# Patient Record
Sex: FEMALE | Race: WHITE | Employment: FULL TIME | URBAN - METROPOLITAN AREA
[De-identification: names, ages, dates, MRNs, and addresses within clinical notes are randomized per-mention and may not be internally consistent; named-entity substitution may affect disease eponyms.]

---

## 2017-02-27 ENCOUNTER — OFFICE VISIT - HEALTHEAST (OUTPATIENT)
Dept: FAMILY MEDICINE | Facility: CLINIC | Age: 32
End: 2017-02-27

## 2017-02-27 DIAGNOSIS — B37.9 CANDIDIASIS: ICD-10-CM

## 2017-06-21 ENCOUNTER — OFFICE VISIT - HEALTHEAST (OUTPATIENT)
Dept: FAMILY MEDICINE | Facility: CLINIC | Age: 32
End: 2017-06-21

## 2017-06-21 DIAGNOSIS — F41.9 ANXIETY: ICD-10-CM

## 2017-06-21 DIAGNOSIS — M25.50 ARTHRALGIA: ICD-10-CM

## 2017-06-21 DIAGNOSIS — M79.10 MYALGIA: ICD-10-CM

## 2017-06-21 ASSESSMENT — MIFFLIN-ST. JEOR: SCORE: 1403.49

## 2017-08-21 ENCOUNTER — OFFICE VISIT (OUTPATIENT)
Dept: OPTOMETRY | Facility: CLINIC | Age: 32
End: 2017-08-21
Payer: COMMERCIAL

## 2017-08-21 DIAGNOSIS — H52.13 MYOPIA OF BOTH EYES: ICD-10-CM

## 2017-08-21 DIAGNOSIS — H52.222 REGULAR ASTIGMATISM OF LEFT EYE: ICD-10-CM

## 2017-08-21 PROCEDURE — 92015 DETERMINE REFRACTIVE STATE: CPT | Performed by: OPTOMETRIST

## 2017-08-21 PROCEDURE — 92310 CONTACT LENS FITTING OU: CPT | Performed by: OPTOMETRIST

## 2017-08-21 PROCEDURE — 92012 INTRM OPH EXAM EST PATIENT: CPT | Mod: GA | Performed by: OPTOMETRIST

## 2017-08-21 ASSESSMENT — CONF VISUAL FIELD
OS_NORMAL: 1
OD_NORMAL: 1

## 2017-08-21 ASSESSMENT — KERATOMETRY
METHOD_AUTO_MANUAL: AUTOMATED
OD_AXISANGLE_DEGREES: 180
OD_K2POWER_DIOPTERS: 44.25
OS_K2POWER_DIOPTERS: 44.25
OS_AXISANGLE_DEGREES: 176
OS_K1POWER_DIOPTERS: 43.75
OD_K1POWER_DIOPTERS: 43.75

## 2017-08-21 ASSESSMENT — SLIT LAMP EXAM - LIDS
COMMENTS: NORMAL
COMMENTS: NORMAL

## 2017-08-21 ASSESSMENT — TONOMETRY
IOP_METHOD: APPLANATION
OS_IOP_MMHG: 13
OD_IOP_MMHG: 13

## 2017-08-21 ASSESSMENT — REFRACTION_CURRENTRX
OS_SPHERE: -6.00
OD_BASECURVE: 8.40
OD_SPHERE: -6.00
OS_DIAMETER: 14.0
OS_BASECURVE: 8.40
OD_DIAMETER: 14.0

## 2017-08-21 ASSESSMENT — VISUAL ACUITY
OD_CC: 20/20
OS_SC: CF @ 3'
METHOD: SNELLEN - LINEAR
OD_SC: CF @ 3'
OD_CC: 20/20
OS_CC: 20/20
OS_CC: 20/20
CORRECTION_TYPE: CONTACTS

## 2017-08-21 ASSESSMENT — REFRACTION_WEARINGRX
OS_CYLINDER: SPHERE
OD_SPHERE: -6.25
OS_SPHERE: -6.75
OD_CYLINDER: SPHERE
SPECS_TYPE: SVL

## 2017-08-21 ASSESSMENT — CUP TO DISC RATIO
OD_RATIO: 0.3
OS_RATIO: 0.3

## 2017-08-21 ASSESSMENT — EXTERNAL EXAM - LEFT EYE: OS_EXAM: NORMAL

## 2017-08-21 ASSESSMENT — REFRACTION_MANIFEST
OD_CYLINDER: SPHERE
OS_CYLINDER: +0.25
OS_SPHERE: -6.75
OD_SPHERE: -6.25
OS_AXIS: 180

## 2017-08-21 ASSESSMENT — EXTERNAL EXAM - RIGHT EYE: OD_EXAM: NORMAL

## 2017-08-21 NOTE — PROGRESS NOTES
Chief Complaint   Patient presents with     COMPREHENSIVE EYE EXAM     Accompanied by self  Previous contact lens wearer? Yes:   Comfort of contact lenses : yes  Satisfied with current lenses: Yes        Last Eye Exam: 2 years ago  Dilated Previously: Yes, no dilation today    What are you currently using to see?  glasses and contacts    Distance Vision Acuity: Satisfied with vision    Near Vision Acuity: Satisfied with vision while reading  with glasses    Eye Comfort: good  Do you use eye drops? : No  Occupation or Hobbies: pharm tech      Oanh Dove, Optometric Tech     Medical, surgical and family histories reviewed and updated 8/21/2017.       OBJECTIVE: See Ophthalmology exam    ASSESSMENT:    ICD-10-CM    1. Myopia of both eyes H52.13 EYE EXAM (SIMPLE-NONBILLABLE)     REFRACTION     CONTACT LENS FITTING,BILAT   2. Regular astigmatism of left eye H52.222 EYE EXAM (SIMPLE-NONBILLABLE)     REFRACTION     CONTACT LENS FITTING,BILAT      PLAN:   A final glasses prescription was given.  Allow time for adaptation.  The glasses may cause dizziness and affect depth perception for awhile.  Contact lens fitting, no change from previous prescription.  Gave Contact Lens Prescription, okay to order contact lenses  Return to clinic as needed, or 1 year for comprehensive vision exam       Ruthann Chahal O.D.  [address]  [address] [phone]    <  Optometry Providers       Clinic Locations                                 Telephone Number   Dr. Tita Chahal [facility] [phone]     [facility] Hours:                [facility] Hours:       [facility] Hours:  [address]   [address] [facility]     [address] [place] [address]   [place] [address]    [place] [address]  Phone: [phone]                    Phone  [phone]                      Phone: [phone]                          Monday 8:00-7:00                          Monday 8:00-7:00                          Monday 8:00-7:00              Tuesday 8:00-6:00                          Tuesday 8:00-7:00                          Tuesday 8:00-7:00              Wednesday 8:00-6:00                  Wednesday 8:00-7:00                   Wednesday 8:00-7:00      Thursday 8:00-6:00                        Thursday 8:00-7:00                         Thursday 8:00-7:00            Friday 8:00-5:00                              Friday 8:00-5:00                              Friday 8:00-5:00    Please log on to [email or web address] to order your contact lenses.  The link is found on the [facility] Care [facility] Vision Services page.  As always, Thank you for trusting us with your health care needs!

## 2017-08-21 NOTE — MR AVS SNAPSHOT
After Visit Summary   8/21/2017    Alexi Montemayor    MRN: [id number]           Patient Information     Date Of Birth          1985        Visit Information        Provider Department      8/21/2017 3:30 PM Ruthann Chahal OD [facility]        Today's Diagnoses     Myopia of both eyes        Regular astigmatism of left eye          Care Instructions        A final glasses prescription was given.  Allow time for adaptation.  The glasses may cause dizziness and affect depth perception for awhile.  Contact lens fitting, no change from previous prescription.  Gave Contact Lens Prescription, okay to order contact lenses  Return to clinic as needed, or 1 year for comprehensive vision exam       Ruthann Chahal O.D.  [facility]  [address] [place] [address]  [address] [phone]    <  Optometry Providers       Clinic Locations                                 Telephone Number   Dr. Tita Chahal [facility] [phone]     [facility] Hours:                [facility] Hours:       [facility] Hours:  [address]   [address] [facility]     [address] [place] [address]   [place] [address]    [place] [address]  Phone: [phone]                    Phone [phone]                      Phone: [phone]                          Monday 8:00-7:00                          Monday 8:00-7:00                          Monday 8:00-7:00              Tuesday 8:00-6:00                          Tuesday 8:00-7:00                          Tuesday 8:00-7:00              Wednesday 8:00-6:00                  Wednesday 8:00-7:00                   Wednesday 8:00-7:00      Thursday 8:00-6:00                        Thursday 8:00-7:00                         Thursday 8:00-7:00            Friday 8:00-5:00                              Friday  "8:00-5:00                              Friday 8:00-5:00    Please log on to [email or web address] to order your contact lenses.  The link is found on the Eye Care and Vision Services page.  As always, Thank you for trusting us with your health care needs!                    Follow-ups after your visit        Follow-up notes from your care team     Return in about 1 year (around 2018) for Eye Exam.      Who to contact     If you have questions or need follow up information about today's clinic visit or your schedule please contact [facility]UAN CARLOS directly at [phone].  Normal or non-critical lab and imaging results will be communicated to you by [employer], letter or phone within 4 business days after the clinic has received the results. If you do not hear from us within 7 days, please contact the clinic through [employer] or phone. If you have a critical or abnormal lab result, we will notify you by phone as soon as possible.  Submit refill requests through [employer] or call your pharmacy and they will forward the refill request to us. Please allow 3 business days for your refill to be completed.          Additional Information About Your Visit        MyChart Information     [employer] lets you send messages to your doctor, view your test results, renew your prescriptions, schedule appointments and more. To sign up, go to [email or web address] . Click on \"Log in\" on the left side of the screen, which will take you to the Welcome page. Then click on \"Sign up Now\" on the right side of the page.     You will be asked to enter the access code listed below, as well as some personal information. Please follow the directions to create your username and password.     Your access code is: [email or web address]  Expires: 2017  4:34 PM     Your access code will  in 90 days. If you need help or a new code, please call your [facility] or [phone].        Care EveryWhere ID     This is your Care EveryWhere ID. This " could be used by other organizations to access your [facility] medical records  [id number]         Blood Pressure from Last 3 Encounters:   12/05/14 108/78    Weight from Last 3 Encounters:   12/05/14 69.6 kg (153 lb 8 oz)              We Performed the Following     CONTACT LENS FITTING,BILAT     EYE EXAM (SIMPLE-NONBILLABLE)     REFRACTION        Primary Care Provider Office Phone # Fax #    Judie Marcia Stern [phone]       [facility] [address] [place] [address]        Equal Access to Services     [facility]: Hadii aad ku hadasho Soomaali, waaxda luqadaha, qaybta kaalmada adeegyada, waxay idiin hayaan adeeg kharash laDirkaan . So [facility] [phone].    ATENCIÓN: Si habla español, tiene a cormier disposición servicios gratuitos de asistencia lingüística. [facility] [phone].    We comply with applicable federal civil rights laws and [place] laws. We do not discriminate on the basis of race, color, national origin, age, disability sex, sexual orientation or gender identity.            Thank you!     Thank you for choosing [facility]  for your care. Our goal is always to provide you with excellent care. Hearing back from our patients is one way we can continue to improve our services. Please take a few minutes to complete the written survey that you may receive in the mail after your visit with us. Thank you!             Your Updated Medication List - Protect others around you: Learn how to safely use, store and throw away your medicines at www.disposemymeds.org.          This list is accurate as of: 8/21/17  4:35 PM.  Always use your most recent med list.                   Brand Name Dispense Instructions for use Diagnosis    OMEPRAZOLE PO

## 2017-08-21 NOTE — PATIENT INSTRUCTIONS
A final glasses prescription was given.  Allow time for adaptation.  The glasses may cause dizziness and affect depth perception for awhile.  Contact lens fitting, no change from previous prescription.  Gave Contact Lens Prescription, okay to order contact lenses  Return to clinic as needed, or 1 year for comprehensive vision exam       Ruthann Chahal O.D.  [facility]  [address] [place]  [address] [phone]    <  Optometry Providers       Clinic Locations                                 Telephone Number   Dr. Tita Chahal [facility] [phone]     [facility] Hours:                [facility] Hours:       [facility] Hours:  [address]   [address] [facility]     [address] [place] [address]   [place] [address]    [place] [address]  Phone: [phone]                    Phone [phone]                      Phone: [phone]                          Monday 8:00-7:00                          Monday 8:00-7:00                          Monday 8:00-7:00              Tuesday 8:00-6:00                          Tuesday 8:00-7:00                          Tuesday 8:00-7:00              Wednesday 8:00-6:00                  Wednesday 8:00-7:00                   Wednesday 8:00-7:00      Thursday 8:00-6:00                        Thursday 8:00-7:00                         Thursday 8:00-7:00            Friday 8:00-5:00                              Friday 8:00-5:00                              Friday 8:00-5:00    Please log on to [email or web address] to order your contact lenses.  The link is found on the Eye Care and Vision Services page.  As always, Thank you for trusting us with your health care needs!

## 2017-09-28 ENCOUNTER — COMMUNICATION - HEALTHEAST (OUTPATIENT)
Dept: FAMILY MEDICINE | Facility: CLINIC | Age: 32
End: 2017-09-28

## 2017-09-28 ENCOUNTER — OFFICE VISIT - HEALTHEAST (OUTPATIENT)
Dept: FAMILY MEDICINE | Facility: CLINIC | Age: 32
End: 2017-09-28

## 2017-09-28 DIAGNOSIS — Z00.00 ROUTINE GENERAL MEDICAL EXAMINATION AT A HEALTH CARE FACILITY: ICD-10-CM

## 2017-09-28 LAB
CHOLEST SERPL-MCNC: 153 MG/DL
FASTING STATUS PATIENT QL REPORTED: YES
HDLC SERPL-MCNC: 44 MG/DL
LDLC SERPL CALC-MCNC: 100 MG/DL
TRIGL SERPL-MCNC: 46 MG/DL

## 2017-09-28 ASSESSMENT — MIFFLIN-ST. JEOR: SCORE: 1417.38

## 2017-12-24 ENCOUNTER — HEALTH MAINTENANCE LETTER (OUTPATIENT)
Age: 32
End: 2017-12-24

## 2018-03-06 ENCOUNTER — OFFICE VISIT - HEALTHEAST (OUTPATIENT)
Dept: FAMILY MEDICINE | Facility: CLINIC | Age: 33
End: 2018-03-06

## 2018-03-06 DIAGNOSIS — J20.9 ACUTE BRONCHITIS: ICD-10-CM

## 2018-04-02 ENCOUNTER — OFFICE VISIT (OUTPATIENT)
Dept: ORTHOPEDICS | Facility: CLINIC | Age: 33
End: 2018-04-02
Payer: COMMERCIAL

## 2018-04-02 VITALS
HEIGHT: 64 IN | SYSTOLIC BLOOD PRESSURE: 145 MMHG | HEART RATE: 130 BPM | WEIGHT: 167 LBS | BODY MASS INDEX: 28.51 KG/M2 | DIASTOLIC BLOOD PRESSURE: 84 MMHG

## 2018-04-02 DIAGNOSIS — M54.50 ACUTE MIDLINE LOW BACK PAIN WITHOUT SCIATICA: Primary | ICD-10-CM

## 2018-04-02 NOTE — PROGRESS NOTES
SPORTS & ORTHOPEDIC WALK-IN VISIT 4/2/2018    Primary Care Physician: Dr. Stern    Reason for visit:     What part of your body is injured / painful? Left  low back    What caused the injury /pain? No inciting event     How long ago did your injury occur or pain begin? today    What are your most bothersome symptoms? Pain    How would you characterize your symptom?  aching and dull    What makes your symptoms better? Nothing    What makes your symptoms worse? Movement    Have you been previously seen for this problem? No    Medical History:    Any recent changes to your medical history? No    Any new medication prescribed since last visit? No    Have you had surgery on this body part before? 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 No    Do you have any vision problems? No    Do you have any chest pain or edema? No    Do you have any shortness of breath or wheezing?  No    Do you have stomach problems? No    Do you have any numbness or focal weakness? No    Do you have diabetes? No    Do you have problems with bleeding or clotting? No    Do you have an rashes or other skin lesions? No           PMH:  Past Medical History:   Diagnosis Date     Menarche 13 years old       Active problem list:  There is no problem list on file for this patient.      FH:  Family History   Problem Relation Age of Onset     Glaucoma No family hx of      Macular Degeneration No family hx of    mother MS    SH:  Social History     Social History     Marital status:      Spouse name: N/A     Number of children: N/A     Years of education: N/A     Occupational History     Not on file.     Social History Main Topics     Smoking status: Never Smoker     Smokeless tobacco: Never Used     Alcohol use Yes     Drug use: No     Sexual activity: Yes     Partners: Male     Birth control/ protection: Condom     Other Topics  Concern     Not on file     Social History Narrative     No narrative on file       MEDS:  See EMR, reviewed  ALL:  See EMR, reviewed    REVIEW OF SYSTEMS:  CONSTITUTIONAL:NEGATIVE for fever, chills, change in weight  INTEGUMENTARY/SKIN: NEGATIVE for worrisome rashes, moles or lesions  EYES: NEGATIVE for vision changes or irritation  ENT/MOUTH: NEGATIVE for ear, mouth and throat problems  RESP:NEGATIVE for significant cough or SOB  BREAST: NEGATIVE for masses, tenderness or discharge  CV: NEGATIVE for chest pain, palpitations or peripheral edema  GI: NEGATIVE for nausea, abdominal pain, heartburn, or change in bowel habits  :NEGATIVE for frequency, dysuria, or hematuria  :NEGATIVE for frequency, dysuria, or hematuria  NEURO: NEGATIVE for weakness, dizziness or paresthesias  ENDOCRINE: NEGATIVE for temperature intolerance, skin/hair changes  HEME/ALLERGY/IMMUNE: NEGATIVE for bleeding problems  PSYCHIATRIC: NEGATIVE for changes in mood or affect      SUBJECTIVE:  This 32-year-old female who works in this building in the last 2 hours has noted some discomfort in the centralized part of her lumbar spine.  She notes that in the upper part of the lumbar spine and slightly towards the right of the lumbar spine it seems to bother her more when she twists to this side.  She can think of no particular injury that caused it.  She believes it may have been bothered from twisting and lifting, but she is not sure.  She denies a past history of low back discomfort recurrently.  She currently denies abdominal pain.  She denies discomfort radiating into the lower extremities, numbness or tingling into the lower extremity.  She is in the midst of a menstrual period currently.  She has normal monthly menstrual periods.  She has had no issues with recurrent bladder infections and she has had no recent dysuria or urinary frequency or fever.  She had a slightly loose stool today, but no blood in the stools and she denies history of  recurrent diarrhea.  She is wondering whether the problem is related to anxiety.  She says she gets frequently anxious.      OBJECTIVE:  This is a comfortable appearing female who is speaking in full sentences and moves easily about the room.  She points to her centralized upper part of her low back as the area that can be uncomfortable.  She is able to forward flex easily to touch the ground.  She can extend her spine fully without discomfort.  Straight leg is negative bilaterally.  Lower extremity strength to flexion/extension at hip, knee, ankle including toe strength and foot evertor strength is 5/5 symmetrically bilaterally.  She is nontender over the sacroiliac joints and nontender directly over the lumbar spine.  The overlying skin is normal.  Abdomen is soft with no hepatosplenomegaly.  There is no guarding or rigidity.  There is no discomfort in the right or left lower quadrants and no rebound tenderness.      ASSESSMENT:  Centralized low back discomfort for the past 2 hours.      PLAN:  I told her I did not find a worrisome etiology for this at this time.  We talked about the option of doing a urinalysis to look for signs of a possible kidney stone, but her symptoms are minimal and she is currently in the midst of her menstrual period and we are unlikely to get useful information out of a urinalysis for microscopic blood at this time.  I discussed reasons to return for evaluation such as onset of abdominal discomfort, onset of persistent worsening flank pain, onset of symptoms of bladder infection.  She understands this is an orthopedic urgent care.  She currently does not have symptoms to warrant imaging study of the lumbar spine, but she knows this is an option if the problem is recurrent.  She seemed reassured by these results.  She agrees to follow up with her problems are persistent.

## 2018-04-02 NOTE — LETTER
4/2/2018       RE: Alexi Montemayor  [address] [place] [address]     Dear Colleague,    Thank you for referring your patient, Alexi Montemayor, to the [facility] AND ORTHOPAEDIC WALK IN CLINIC at [facility]. Please see a copy of my visit note below.          SPORTS & ORTHOPEDIC WALK-IN VISIT 4/2/2018    Primary Care Physician: Dr. Stern    Reason for visit:     What part of your body is injured / painful? Left  low back    What caused the injury /pain? No inciting event     How long ago did your injury occur or pain begin? today    What are your most bothersome symptoms? Pain    How would you characterize your symptom?  aching and dull    What makes your symptoms better? Nothing    What makes your symptoms worse? Movement    Have you been previously seen for this problem? No    Medical History:    Any recent changes to your medical history? No    Any new medication prescribed since last visit? No    Have you had surgery on this body part before? 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 No    Do you have any vision problems? No    Do you have any chest pain or edema? No    Do you have any shortness of breath or wheezing?  No    Do you have stomach problems? No    Do you have any numbness or focal weakness? No    Do you have diabetes? No    Do you have problems with bleeding or clotting? No    Do you have an rashes or other skin lesions? No           PMH:  Past Medical History:   Diagnosis Date     Menarche 13 years old       Active problem list:  There is no problem list on file for this patient.      FH:  Family History   Problem Relation Age of Onset     Glaucoma No family hx of      Macular Degeneration No family hx of    mother MS    SH:  Social History     Social History     Marital status:      Spouse name: N/A     Number of children: N/A      Years of education: N/A     Occupational History     Not on file.     Social History Main Topics     Smoking status: Never Smoker     Smokeless tobacco: Never Used     Alcohol use Yes     Drug use: No     Sexual activity: Yes     Partners: Male     Birth control/ protection: Condom     Other Topics Concern     Not on file     Social History Narrative     No narrative on file       MEDS:  See EMR, reviewed  ALL:  See EMR, reviewed    REVIEW OF SYSTEMS:  CONSTITUTIONAL:NEGATIVE for fever, chills, change in weight  INTEGUMENTARY/SKIN: NEGATIVE for worrisome rashes, moles or lesions  EYES: NEGATIVE for vision changes or irritation  ENT/MOUTH: NEGATIVE for ear, mouth and throat problems  RESP:NEGATIVE for significant cough or SOB  BREAST: NEGATIVE for masses, tenderness or discharge  CV: NEGATIVE for chest pain, palpitations or peripheral edema  GI: NEGATIVE for nausea, abdominal pain, heartburn, or change in bowel habits  :NEGATIVE for frequency, dysuria, or hematuria  :NEGATIVE for frequency, dysuria, or hematuria  NEURO: NEGATIVE for weakness, dizziness or paresthesias  ENDOCRINE: NEGATIVE for temperature intolerance, skin/hair changes  HEME/ALLERGY/IMMUNE: NEGATIVE for bleeding problems  PSYCHIATRIC: NEGATIVE for changes in mood or affect      SUBJECTIVE:  This 32-year-old female who works in this building in the last 2 hours has noted some discomfort in the centralized part of her lumbar spine.  She notes that in the upper part of the lumbar spine and slightly towards the right of the lumbar spine it seems to bother her more when she twists to this side.  She can think of no particular injury that caused it.  She believes it may have been bothered from twisting and lifting, but she is not sure.  She denies a past history of low back discomfort recurrently.  She currently denies abdominal pain.  She denies discomfort radiating into the lower extremities, numbness or tingling into the lower extremity.  She is in  the midst of a menstrual period currently.  She has normal monthly menstrual periods.  She has had no issues with recurrent bladder infections and she has had no recent dysuria or urinary frequency or fever.  She had a slightly loose stool today, but no blood in the stools and she denies history of recurrent diarrhea.  She is wondering whether the problem is related to anxiety.  She says she gets frequently anxious.      OBJECTIVE:  This is a comfortable appearing female who is speaking in full sentences and moves easily about the room.  She points to her centralized upper part of her low back as the area that can be uncomfortable.  She is able to forward flex easily to touch the ground.  She can extend her spine fully without discomfort.  Straight leg is negative bilaterally.  Lower extremity strength to flexion/extension at hip, knee, ankle including toe strength and foot evertor strength is 5/5 symmetrically bilaterally.  She is nontender over the sacroiliac joints and nontender directly over the lumbar spine.  The overlying skin is normal.  Abdomen is soft with no hepatosplenomegaly.  There is no guarding or rigidity.  There is no discomfort in the right or left lower quadrants and no rebound tenderness.      ASSESSMENT:  Centralized low back discomfort for the past 2 hours.      PLAN:  I told her I did not find a worrisome etiology for this at this time.  We talked about the option of doing a urinalysis to look for signs of a possible kidney stone, but her symptoms are minimal and she is currently in the midst of her menstrual period and we are unlikely to get useful information out of a urinalysis for microscopic blood at this time.  I discussed reasons to return for evaluation such as onset of abdominal discomfort, onset of persistent worsening flank pain, onset of symptoms of bladder infection.  She understands this is an orthopedic urgent care.  She currently does not have symptoms to warrant imaging study  of the lumbar spine, but she knows this is an option if the problem is recurrent.  She seemed reassured by these results.  She agrees to follow up with her problems are persistent.       Sincerely,    Radames Sherwood MD

## 2018-04-02 NOTE — MR AVS SNAPSHOT
After Visit Summary   2018    Alexi Montemayor    MRN: [id number]           Patient Information     Date Of Birth          1985        Visit Information        Provider Department      2018 9:30 AM Radames Sherwood MD [facility]         Follow-ups after your visit        Who to contact     Please call your clinic at [phone] to:    Ask questions about your health    Make or cancel appointments    Discuss your medicines    Learn about your test results    Speak to your doctor            Additional Information About Your Visit        MyChart Information     TripIt is an electronic gateway that provides easy, online access to your medical records. With TripIt, you can request a clinic appointment, read your test results, renew a prescription or communicate with your care team.     To sign up for TripIt visit the website at [email or web address]   You will be asked to enter the access code listed below, as well as some personal information. Please follow the directions to create your username and password.     Your access code is: [email or web address]  Expires: 2018  9:34 AM     Your access code will  in 90 days. If you need help or a new code, please contact your [facility] or call [phone] for assistance.        Care EveryWhere ID     This is your Care EveryWhere ID. This could be used by other organizations to access your [place] medical records  [id number]         Blood Pressure from Last 3 Encounters:   14 108/78    Weight from Last 3 Encounters:   14 69.6 kg (153 lb 8 oz)              Today, you had the following     No orders found for display       Primary Care Provider Office Phone # Fax #    Judie Stern [phone]       [facility] [address], [place] [address]        Equal Access to Services     ALYCE ORTIZ AH: Chilo carney  Benjamin, mustapha bhardwajalfonzoha, annmarie silverman, avelina yonyin hayaan maycolmadhu dafneale laDirkyasmin joleen. So [facility] [phone].    ATENCIÓN: Si habla español, tiene a cormier disposición servicios gratuitos de asistencia lingüística. Arnie al [phone].    We comply with applicable federal civil rights laws and [place] laws. We do not discriminate on the basis of race, color, national origin, age, disability, sex, sexual orientation, or gender identity.            Thank you!     Thank you for choosing [facility]  for your care. Our goal is always to provide you with excellent care. Hearing back from our patients is one way we can continue to improve our services. Please take a few minutes to complete the written survey that you may receive in the mail after your visit with us. Thank you!             Your Updated Medication List - Protect others around you: Learn how to safely use, store and throw away your medicines at www.disposemymeds.org.          This list is accurate as of 4/2/18  9:34 AM.  Always use your most recent med list.                   Brand Name Dispense Instructions for use Diagnosis    OMEPRAZOLE PO

## 2018-05-14 ENCOUNTER — OFFICE VISIT - HEALTHEAST (OUTPATIENT)
Dept: FAMILY MEDICINE | Facility: CLINIC | Age: 33
End: 2018-05-14

## 2018-05-14 ENCOUNTER — COMMUNICATION - HEALTHEAST (OUTPATIENT)
Dept: SCHEDULING | Facility: CLINIC | Age: 33
End: 2018-05-14

## 2018-05-14 DIAGNOSIS — F43.21 FEELING GRIEF: ICD-10-CM

## 2018-05-14 DIAGNOSIS — F41.9 ANXIETY: ICD-10-CM

## 2018-05-26 ENCOUNTER — COMMUNICATION - HEALTHEAST (OUTPATIENT)
Dept: FAMILY MEDICINE | Facility: CLINIC | Age: 33
End: 2018-05-26

## 2018-05-26 DIAGNOSIS — F41.9 ANXIETY: ICD-10-CM

## 2018-06-13 ENCOUNTER — AMBULATORY - HEALTHEAST (OUTPATIENT)
Dept: LAB | Facility: CLINIC | Age: 33
End: 2018-06-13

## 2018-06-13 DIAGNOSIS — F41.9 ANXIETY: ICD-10-CM

## 2018-06-13 LAB — TSH SERPL DL<=0.005 MIU/L-ACNC: 0.88 UIU/ML (ref 0.3–5)

## 2018-06-15 ENCOUNTER — COMMUNICATION - HEALTHEAST (OUTPATIENT)
Dept: FAMILY MEDICINE | Facility: CLINIC | Age: 33
End: 2018-06-15

## 2018-06-19 ENCOUNTER — OFFICE VISIT - HEALTHEAST (OUTPATIENT)
Dept: BEHAVIORAL HEALTH | Facility: HOSPITAL | Age: 33
End: 2018-06-19

## 2018-06-19 DIAGNOSIS — F41.0 PANIC DISORDER WITHOUT AGORAPHOBIA: ICD-10-CM

## 2018-06-26 ENCOUNTER — OFFICE VISIT - HEALTHEAST (OUTPATIENT)
Dept: BEHAVIORAL HEALTH | Facility: HOSPITAL | Age: 33
End: 2018-06-26

## 2018-06-26 DIAGNOSIS — F41.0 PANIC DISORDER WITHOUT AGORAPHOBIA: ICD-10-CM

## 2018-07-05 ENCOUNTER — OFFICE VISIT - HEALTHEAST (OUTPATIENT)
Dept: BEHAVIORAL HEALTH | Facility: CLINIC | Age: 33
End: 2018-07-05

## 2018-07-05 DIAGNOSIS — F41.0 PANIC DISORDER WITHOUT AGORAPHOBIA: ICD-10-CM

## 2018-07-10 ENCOUNTER — OFFICE VISIT - HEALTHEAST (OUTPATIENT)
Dept: FAMILY MEDICINE | Facility: CLINIC | Age: 33
End: 2018-07-10

## 2018-07-10 DIAGNOSIS — F41.9 ANXIETY: ICD-10-CM

## 2018-07-10 DIAGNOSIS — M25.561 RIGHT KNEE PAIN: ICD-10-CM

## 2018-07-10 DIAGNOSIS — M25.50 MULTIPLE JOINT PAIN: ICD-10-CM

## 2018-07-10 DIAGNOSIS — M22.2X9: ICD-10-CM

## 2018-07-10 LAB — RHEUMATOID FACT SERPL-ACNC: <15 IU/ML (ref 0–30)

## 2018-07-11 ENCOUNTER — COMMUNICATION - HEALTHEAST (OUTPATIENT)
Dept: SCHEDULING | Facility: CLINIC | Age: 33
End: 2018-07-11

## 2018-07-16 LAB — ANA SER QL: 0.4 U

## 2018-07-17 ENCOUNTER — AMBULATORY - HEALTHEAST (OUTPATIENT)
Dept: BEHAVIORAL HEALTH | Facility: CLINIC | Age: 33
End: 2018-07-17

## 2018-07-20 ENCOUNTER — AMBULATORY - HEALTHEAST (OUTPATIENT)
Dept: BEHAVIORAL HEALTH | Facility: CLINIC | Age: 33
End: 2018-07-20

## 2018-08-02 ENCOUNTER — COMMUNICATION - HEALTHEAST (OUTPATIENT)
Dept: FAMILY MEDICINE | Facility: CLINIC | Age: 33
End: 2018-08-02

## 2018-08-12 ENCOUNTER — OFFICE VISIT (OUTPATIENT)
Dept: URGENT CARE | Facility: URGENT CARE | Age: 33
End: 2018-08-12
Payer: COMMERCIAL

## 2018-08-12 VITALS
TEMPERATURE: 98.3 F | WEIGHT: 161.5 LBS | HEART RATE: 101 BPM | BODY MASS INDEX: 27.72 KG/M2 | SYSTOLIC BLOOD PRESSURE: 123 MMHG | OXYGEN SATURATION: 99 % | DIASTOLIC BLOOD PRESSURE: 78 MMHG

## 2018-08-12 DIAGNOSIS — R10.2 PELVIC PAIN IN FEMALE: Primary | ICD-10-CM

## 2018-08-12 LAB
SPECIMEN SOURCE: NORMAL
WET PREP SPEC: NORMAL

## 2018-08-12 PROCEDURE — 87252 VIRUS INOCULATION TISSUE: CPT | Mod: 90 | Performed by: FAMILY MEDICINE

## 2018-08-12 PROCEDURE — 87077 CULTURE AEROBIC IDENTIFY: CPT | Performed by: FAMILY MEDICINE

## 2018-08-12 PROCEDURE — 87210 SMEAR WET MOUNT SALINE/INK: CPT | Performed by: FAMILY MEDICINE

## 2018-08-12 PROCEDURE — 87070 CULTURE OTHR SPECIMN AEROBIC: CPT | Performed by: FAMILY MEDICINE

## 2018-08-12 PROCEDURE — 99214 OFFICE O/P EST MOD 30 MIN: CPT | Performed by: FAMILY MEDICINE

## 2018-08-12 PROCEDURE — 87186 SC STD MICRODIL/AGAR DIL: CPT | Performed by: FAMILY MEDICINE

## 2018-08-12 PROCEDURE — 99000 SPECIMEN HANDLING OFFICE-LAB: CPT | Performed by: FAMILY MEDICINE

## 2018-08-12 RX ORDER — MUPIROCIN 20 MG/G
OINTMENT TOPICAL 3 TIMES DAILY
Qty: 30 G | Refills: 1 | Status: SHIPPED | OUTPATIENT
Start: 2018-08-12 | End: 2019-02-21

## 2018-08-12 RX ORDER — CLINDAMYCIN HCL 300 MG
300 CAPSULE ORAL 3 TIMES DAILY
Qty: 21 CAPSULE | Refills: 0 | Status: SHIPPED | OUTPATIENT
Start: 2018-08-12 | End: 2018-11-12

## 2018-08-12 RX ORDER — METRONIDAZOLE 500 MG/1
500 TABLET ORAL 2 TIMES DAILY
Qty: 14 TABLET | Refills: 0 | Status: SHIPPED | OUTPATIENT
Start: 2018-08-12 | End: 2018-11-12

## 2018-08-12 NOTE — PATIENT INSTRUCTIONS
1. Warm sitz bath nightly x 7 days  2. Medication  3. Await test results  4. bactroban to area   5. Follow PCP in the next week.

## 2018-08-12 NOTE — PROGRESS NOTES
SUBJECTIVE:   Alexi Montemayor is a 33 year old female presenting with a chief complaint of   Chief Complaint   Patient presents with     Vaginal Problem     x 4-5 days. Painful blister and yellowish discharge from vaginal area.     She did not have clean underwear to go to work and so did not wear underwear.  By the end of the day it appears that she has had some macerated skin on her right labia.  It began to swell yesterday and she has had a little bit of drainage from the area.    She is an established patient of [facility].        Review of Systems   Skin:        Rt labia is swollen and painful, open lesion. .   All other systems reviewed and are negative.      Past Medical History:   Diagnosis Date     Menarche 13 years old     Family History   Problem Relation Age of Onset     Glaucoma No family hx of      Macular Degeneration No family hx of      No current outpatient prescriptions on file.     Social History   Substance Use Topics     Smoking status: Never Smoker     Smokeless tobacco: Never Used     Alcohol use Yes      Comment: Rarely       OBJECTIVE  /78 (BP Location: Right arm, Patient Position: Chair, Cuff Size: Adult Regular)  Pulse 101  Temp 98.3  F (36.8  C) (Tympanic)  Wt 161 lb 8 oz (73.3 kg)  LMP 07/28/2018 (Approximate)  SpO2 99%  Breastfeeding? No  BMI 27.72 kg/m2    Physical Exam   Genitourinary:   Genitourinary Comments: Rt labial swelling ; drainage from inferior aspect of labia.    Musculoskeletal: Normal range of motion.   Neurological: She is alert.   Skin: Skin is warm.   Rt labial wound open and draining. Cultures were taken.   Psychiatric: She has a normal mood and affect.       Labs:  Results for orders placed or performed in visit on 08/12/18   Wet prep   Result Value Ref Range    Specimen Description Vagina     Wet Prep No Trichomonas seen     Wet Prep No clue cells seen     Wet Prep No yeast seen        X-Ray was not done.    ASSESSMENT:      ICD-10-CM    1. Pelvic pain in  female R10.2       2. Rt Labial wound; draining, cultures pending    She will hold off on the antibiotics except the bactroban until the cultures are back. She will call in 48 hours.    We obtained viral bacterial cultures and addition we did a wet prep.        Medical Decision Making:    Differential Diagnosis: glandular infection, skin infection      Serious Comorbid Conditions:      PLAN:  1. We are awaiting skin culture results.     Followup:    Follow in the next 48 hours.     There are no Patient Instructions on file for this visit.     Level 4 visit ; 25 minutes in exam and counseling. Greater than 50% of time in counseling in regards to infection and vaginitis/vulvitis.

## 2018-08-12 NOTE — MR AVS SNAPSHOT
"              After Visit Summary   2018    Alexi Montemayor    MRN: [id number]           Patient Information     Date Of Birth          1985        Visit Information        Provider Department      2018 3:55 PM Bulmaro Washington MD [facility]        Today's Diagnoses     Pelvic pain in female    -  1      Care Instructions    1. Warm sitz bath nightly x 7 days  2. Medication  3. Await test results  4. bactroban to area   5. Follow PCP in the next week.          Follow-ups after your visit        Who to contact     If you have questions or need follow up information about today's clinic visit or your schedule please contact [facility] directly at [phone].  Normal or non-critical lab and imaging results will be communicated to you by infibondhart, letter or phone within 4 business days after the clinic has received the results. If you do not hear from us within 7 days, please contact the clinic through infibondhart or phone. If you have a critical or abnormal lab result, we will notify you by phone as soon as possible.  Submit refill requests through GlossyBox or call your pharmacy and they will forward the refill request to us. Please allow 3 business days for your refill to be completed.          Additional Information About Your Visit        MyChart Information     GlossyBox lets you send messages to your doctor, view your test results, renew your prescriptions, schedule appointments and more. To sign up, go to [email or web address] . Click on \"Log in\" on the left side of the screen, which will take you to the Welcome page. Then click on \"Sign up Now\" on the right side of the page.     You will be asked to enter the access code listed below, as well as some personal information. Please follow the directions to create your username and password.     Your access code is: [email or web address]  Expires: 11/10/2018  4:37 PM     Your access code will  in 90 days. If you need help " or a new code, please call your [facility] or [phone].        Care EveryWhere ID     This is your Care EveryWhere ID. This could be used by other organizations to access your [place] medical records  [id number]        Your Vitals Were     Pulse Temperature Last Period Pulse Oximetry Breastfeeding? BMI (Body Mass Index)    101 98.3  F (36.8  C) (Tympanic) 07/28/2018 (Approximate) 99% No 27.72 kg/m2       Blood Pressure from Last 3 Encounters:   08/12/18 123/78   04/02/18 145/84   12/05/14 108/78    Weight from Last 3 Encounters:   08/12/18 161 lb 8 oz (73.3 kg)   04/02/18 167 lb (75.8 kg)   12/05/14 153 lb 8 oz (69.6 kg)              Today, you had the following     No orders found for display         Today's Medication Changes          These changes are accurate as of 8/12/18  4:37 PM.  If you have any questions, ask your nurse or doctor.               Start taking these medicines.        Dose/Directions    clindamycin 300 MG capsule   Commonly known as:  CLEOCIN   Used for:  Pelvic pain in female   Started by:  Bulmaro Washington MD        Dose:  300 mg   Take 1 capsule (300 mg) by mouth 3 times daily   Quantity:  21 capsule   Refills:  0       metroNIDAZOLE 500 MG tablet   Commonly known as:  FLAGYL   Used for:  Pelvic pain in female   Started by:  Bulmaro Washington MD        Dose:  500 mg   Take 1 tablet (500 mg) by mouth 2 times daily   Quantity:  14 tablet   Refills:  0            Where to get your medicines      These medications were sent to [facility] [address] [place] [address] AT [place] [address]  [address] [place] [id number]     Phone:  [phone]     clindamycin 300 MG capsule    metroNIDAZOLE 500 MG tablet                Primary Care Provider Office Phone # Fax #    Judie Stern [phone]       [facility] [address] [place] [id number]        Equal Access to Services     ALYCE ORTIZ AH: Chilo lacy  zainab Alexander, wajanelda lukellieadaha, qaybta karaza silverman, avelina yonyin hayaakieran severinomadhu dafneale lagretchenkieran joleen. So [facility] [phone].    ATENCIÓN: Si habla español, tiene a cormier disposición servicios gratuitos de asistencia lingüística. Arnie al [phone].    We comply with applicable federal civil rights laws and [place] laws. We do not discriminate on the basis of race, color, national origin, age, disability, sex, sexual orientation, or gender identity.            Thank you!     Thank you for choosing [facility]  for your care. Our goal is always to provide you with excellent care. Hearing back from our patients is one way we can continue to improve our services. Please take a few minutes to complete the written survey that you may receive in the mail after your visit with us. Thank you!             Your Updated Medication List - Protect others around you: Learn how to safely use, store and throw away your medicines at www.disposemymeds.org.          This list is accurate as of 8/12/18  4:37 PM.  Always use your most recent med list.                   Brand Name Dispense Instructions for use Diagnosis    clindamycin 300 MG capsule    CLEOCIN    21 capsule    Take 1 capsule (300 mg) by mouth 3 times daily    Pelvic pain in female       metroNIDAZOLE 500 MG tablet    FLAGYL    14 tablet    Take 1 tablet (500 mg) by mouth 2 times daily    Pelvic pain in female

## 2018-08-15 LAB
BACTERIA SPEC CULT: ABNORMAL
Lab: ABNORMAL
SPECIMEN SOURCE: ABNORMAL

## 2018-08-16 ENCOUNTER — TELEPHONE (OUTPATIENT)
Dept: URGENT CARE | Facility: URGENT CARE | Age: 33
End: 2018-08-16

## 2018-08-16 NOTE — TELEPHONE ENCOUNTER
Per Ami, Culture results are back. She is to take the Clindamycin and no need for other medications.  I tried to call Pt. But no answer left a message.   Yuliet Bui  CMA

## 2018-08-25 LAB
SPECIMEN SOURCE: NORMAL
VIRUS SPEC CULT: NORMAL
VIRUS SPEC CULT: NORMAL

## 2018-09-27 ENCOUNTER — COMMUNICATION - HEALTHEAST (OUTPATIENT)
Dept: SCHEDULING | Facility: CLINIC | Age: 33
End: 2018-09-27

## 2018-10-12 ENCOUNTER — APPOINTMENT (OUTPATIENT)
Dept: GENERAL RADIOLOGY | Facility: CLINIC | Age: 33
End: 2018-10-12
Attending: INTERNAL MEDICINE
Payer: COMMERCIAL

## 2018-10-12 ENCOUNTER — HOSPITAL ENCOUNTER (EMERGENCY)
Facility: CLINIC | Age: 33
Discharge: HOME | End: 2018-10-12
Attending: INTERNAL MEDICINE | Admitting: INTERNAL MEDICINE
Payer: COMMERCIAL

## 2018-10-12 VITALS
DIASTOLIC BLOOD PRESSURE: 65 MMHG | OXYGEN SATURATION: 98 % | SYSTOLIC BLOOD PRESSURE: 118 MMHG | RESPIRATION RATE: 12 BRPM | BODY MASS INDEX: 28.35 KG/M2 | HEIGHT: 63 IN | WEIGHT: 160 LBS | TEMPERATURE: 98.4 F

## 2018-10-12 DIAGNOSIS — R07.9 ACUTE CHEST PAIN: ICD-10-CM

## 2018-10-12 DIAGNOSIS — F41.9 ANXIETY: ICD-10-CM

## 2018-10-12 LAB
ANION GAP SERPL CALCULATED.3IONS-SCNC: 10 MMOL/L (ref 3–14)
BASOPHILS # BLD AUTO: 0 10E9/L (ref 0–0.2)
BASOPHILS NFR BLD AUTO: 0.4 %
BUN SERPL-MCNC: 12 MG/DL (ref 7–30)
CALCIUM SERPL-MCNC: 8.6 MG/DL (ref 8.5–10.1)
CHLORIDE SERPL-SCNC: 109 MMOL/L (ref 94–109)
CO2 SERPL-SCNC: 22 MMOL/L (ref 20–32)
CREAT SERPL-MCNC: 0.69 MG/DL (ref 0.52–1.04)
D DIMER PPP FEU-MCNC: 0.4 UG/ML FEU (ref 0–0.5)
DIFFERENTIAL METHOD BLD: NORMAL
EOSINOPHIL # BLD AUTO: 0.1 10E9/L (ref 0–0.7)
EOSINOPHIL NFR BLD AUTO: 1.7 %
ERYTHROCYTE [DISTWIDTH] IN BLOOD BY AUTOMATED COUNT: 12.6 % (ref 10–15)
GFR SERPL CREATININE-BSD FRML MDRD: >90 ML/MIN/1.7M2
GLUCOSE SERPL-MCNC: 103 MG/DL (ref 70–99)
HCT VFR BLD AUTO: 39.1 % (ref 35–47)
HGB BLD-MCNC: 12.8 G/DL (ref 11.7–15.7)
IMM GRANULOCYTES # BLD: 0 10E9/L (ref 0–0.4)
IMM GRANULOCYTES NFR BLD: 0.4 %
INTERPRETATION ECG - MUSE: NORMAL
LYMPHOCYTES # BLD AUTO: 3 10E9/L (ref 0.8–5.3)
LYMPHOCYTES NFR BLD AUTO: 39.6 %
MCH RBC QN AUTO: 28.6 PG (ref 26.5–33)
MCHC RBC AUTO-ENTMCNC: 32.7 G/DL (ref 31.5–36.5)
MCV RBC AUTO: 88 FL (ref 78–100)
MONOCYTES # BLD AUTO: 0.7 10E9/L (ref 0–1.3)
MONOCYTES NFR BLD AUTO: 9 %
NEUTROPHILS # BLD AUTO: 3.7 10E9/L (ref 1.6–8.3)
NEUTROPHILS NFR BLD AUTO: 48.9 %
NRBC # BLD AUTO: 0 10*3/UL
NRBC BLD AUTO-RTO: 0 /100
PLATELET # BLD AUTO: 276 10E9/L (ref 150–450)
POTASSIUM SERPL-SCNC: 3.5 MMOL/L (ref 3.4–5.3)
RBC # BLD AUTO: 4.47 10E12/L (ref 3.8–5.2)
SODIUM SERPL-SCNC: 141 MMOL/L (ref 133–144)
TROPONIN I SERPL-MCNC: <0.015 UG/L (ref 0–0.04)
WBC # BLD AUTO: 7.6 10E9/L (ref 4–11)

## 2018-10-12 PROCEDURE — 85379 FIBRIN DEGRADATION QUANT: CPT | Performed by: PHYSICIAN ASSISTANT

## 2018-10-12 PROCEDURE — 99285 EMERGENCY DEPT VISIT HI MDM: CPT | Mod: 25

## 2018-10-12 PROCEDURE — 85025 COMPLETE CBC W/AUTO DIFF WBC: CPT | Performed by: INTERNAL MEDICINE

## 2018-10-12 PROCEDURE — 84484 ASSAY OF TROPONIN QUANT: CPT | Performed by: INTERNAL MEDICINE

## 2018-10-12 PROCEDURE — 85379 FIBRIN DEGRADATION QUANT: CPT | Performed by: INTERNAL MEDICINE

## 2018-10-12 PROCEDURE — 71046 X-RAY EXAM CHEST 2 VIEWS: CPT

## 2018-10-12 PROCEDURE — 80048 BASIC METABOLIC PNL TOTAL CA: CPT | Performed by: INTERNAL MEDICINE

## 2018-10-12 PROCEDURE — 93005 ELECTROCARDIOGRAM TRACING: CPT

## 2018-10-12 RX ORDER — KETOROLAC TROMETHAMINE 15 MG/ML
15 INJECTION, SOLUTION INTRAMUSCULAR; INTRAVENOUS ONCE
Status: DISCONTINUED | OUTPATIENT
Start: 2018-10-12 | End: 2018-10-12

## 2018-10-12 ASSESSMENT — ENCOUNTER SYMPTOMS
DIAPHORESIS: 0
NERVOUS/ANXIOUS: 1
SHORTNESS OF BREATH: 1
CHILLS: 0
ABDOMINAL PAIN: 0
NAUSEA: 0
COUGH: 1
CHEST TIGHTNESS: 1
FEVER: 0

## 2018-10-12 NOTE — LETTER
October 12, 2018      To Whom It May Concern:      Alexi Montemayor was seen in our Emergency Department today, 10/12/18.  I expect her condition to improve over the next 1-2 days.  She may return to work/school when improved.    Sincerely,        Shana Schmidt RN

## 2018-10-12 NOTE — DISCHARGE INSTRUCTIONS
Treating Anxiety Disorders with Therapy    If you have an anxiety disorder, you don t have to suffer anymore. Treatment is available. Therapy (also called counseling) is often a helpful treatment for anxiety disorders. With therapy, a specially trained professional (therapist) helps you face and learn to manage your anxiety. Therapy can be short-term or long-term depending on your needs. In some cases, medicine may also be prescribed with therapy. It may take time before you notice how much therapy is helping, but stick with it. With therapy, you can feel better.  Cognitive behavioral therapy (CBT)  Cognitive behavioral therapy (CBT) teaches you to manage anxiety. It does this by helping you understand how you think and act when you re anxious. Research has shown CBT to be a very effective treatment for anxiety disorders. How CBT is run is almost like a class. It involves homework and activities to build skills that teach you to cope with anxiety step by step. It can be done in a group or one-on-one, and often takes place for a set number of sessions. CBT has two main parts:    Cognitive therapy helps you identify the negative, irrational thoughts that occur with your anxiety. You ll learn to replace these with more positive, realistic thoughts.    Behavioral therapy helps you change how you react to anxiety. You ll learn coping skills and methods for relaxing to help you better deal with anxiety.  Other forms of therapy  Other therapy methods may work better for you than CBT. Or, you may move from CBT to another form of therapy as your treatment needs change. This may mean meeting with a therapist by yourself or in a group. Therapy can also help you work through problems in your life, such as drug or alcohol dependence, that may be making your anxiety worse.  Getting better takes time  Therapy will help you feel better and teach you skills to help manage anxiety long term. But change doesn t happen right away. It  takes a commitment from you. And treatment only works if you learn to face the causes of your anxiety. So, you might feel worse before you feel better. This can sometimes make it hard to stick with it. But remember: Therapy is a very effective treatment. The results will be well worth it.  Helping yourself  If anxiety is wearing you down, here are some things you can do to cope:    Check with your doctor and rule out any physical problems that may be causing the anxiety symptoms.    If an anxiety disorder is diagnosed, seek mental healthcare. This is an illness and it can respond to treatment. Most types of anxiety disorders will respond to talk therapy and medicine.    Educate yourself about anxiety disorders. Keep track of helpful online resources and books you can use during stressful periods.    Try stress management techniques such as meditation.    Consider online or in-person support groups.    Don t fight your feelings. Anxiety feeds itself. The more you worry about it, the worse it gets. Instead, try to identify what might have triggered your anxiety. Then try to put this threat in perspective.    Keep in mind that you can t control everything about a situation. Change what you can and let the rest take its course.    Exercise -- it s a great way to relieve tension and help your body feel relaxed.    Examine your life for stress, and try to find ways to reduce it.    Avoid caffeine and nicotine, which can make anxiety symptoms worse.    Fight the temptation to turn to alcohol or unprescribed drugs for relief. They only make things worse in the long run.   Date Last Reviewed: 1/1/2017 2000-2017 [employer]. [address] [facility], [place] [address]. All rights reserved. This information is not intended as a substitute for professional medical care. Always follow your healthcare professional's instructions.

## 2018-10-12 NOTE — ED TRIAGE NOTES
Pt states she was moving last week (sunday) felt right shoulder/chest discomfort later that day. That pain resolved within a day.   Wednesday was at [employer], bending over and noticed pain with bending in center of chest/right side. Noticed more with deep breathing. Lasted approximately 40 minutes and then resolved. No pain yesterday.  This morning was lying in bed flat on back and noticed pain again with cough. Denies pain currently.

## 2018-10-12 NOTE — ED PROVIDER NOTES
History     Chief Complaint:  Chest pain     HPI   Alexi Montemayor is a 33 year old female with a history of anxiety and gastritis, who presents to the ED this morning for evaluation of sudden onset chest pain. The patient denies a previous history of blood clots or heart disease, but states that 2 days ago she was bending over at target when she felt a sudden sharp pain at the center of her chest. This pain lasted about 40 minutes, before subsiding, although she has since had returning pain with bending, deep inspiration or cough. She notes that prior to this she did have some left shoulder pain after a day of heavy lifting, but states that this pain is new in onset and not comparable to previous cases of gastritis. She has had no symptoms with exertion.  Then, this morning about 1 hour prior to arrival, the patient was laying in bed when she began to cough and felt this centered chest pain return, without radiation, prompting her to the ED. There is no blood productive with this cough. She is anxious and tachycardic on arrival, and endorses shortness of breath of breath due to this. The patient otherwise denies associated leg swelling, diaphoresis, or nausea. The patient has been eating and drinking fine, and does not endorse any further acute symptoms at this time such as chills or fever.     CARDIAC RISK FACTORS:  Sex:    Female  Tobacco:   Negative  Hypertension:   Negative  Hyperlipidemia:  Negative  Diabetes:   Negative  Family History:  Hyperlipidemia    PE/DVT RISK FACTORS:  Sex:    Female  Hormones:   Negative  Tobacco:   Negative  Cancer:   Negative  Travel:   Negative  Surgery:   Negative  Other immobilization: Negative  Personal history:  Negative  Family history:  Negative    Allergies:  Penicillins    Medications:    The patient is not currently taking any prescribed medications.    Past Medical History:    Gastritis  Menarche   Anxiety    Past Surgical History:    History reviewed. No pertinent  "surgical history.    Family History:    Hypertension    Social History:  The patient was accompanied to the ED by her son.  Smoking Status: Never  Smokeless Tobacco: Never  Alcohol Use: Yes  Marital Status:        Review of Systems   Constitutional: Negative for chills, diaphoresis and fever.   Respiratory: Positive for cough, chest tightness and shortness of breath.    Cardiovascular: Positive for chest pain. Negative for leg swelling.   Gastrointestinal: Negative for abdominal pain and nausea.   Psychiatric/Behavioral: The patient is nervous/anxious.    All other systems reviewed and are negative.    Physical Exam     Patient Vitals for the past 24 hrs:   BP Temp Temp src Heart Rate Resp SpO2 Height Weight   10/12/18 0904 - - - - 12 - - -   10/12/18 0903 - - - - - 98 % - -   10/12/18 0901 118/65 - - 107 - - - -   10/12/18 0812 128/76 98.4  F (36.9  C) Oral 96 20 100 % 1.6 m (5' 3\") 72.6 kg (160 lb)   10/12/18 0803 - - - - - 98 % - -   10/12/18 0759 128/76 - - - - - - -       Physical Exam  General: Anxious appearing female.  Awake and alert.  Head:  Scalp is NC/AT  Eyes:  No scleral icterus, PERRL  ENT:  The external nose and ears are normal.   CV:  Tachycardic but regular.    No pathologic murmur, rubs, or gallops.  Resp:  Breath sounds are clear bilaterally.  No crackles, wheezes, rhonchi.    Non-labored, no retractions or accessory muscle use  GI:  Abdomen is soft, no distension, no tenderness. No peritoneal signs.  Bowel sounds present in all quadrants.  :  No suprapubic or flank tenderness  MS:  No lower extremity edema     No tenderness to palpation over anterior chest wall.  Skin:  Warm and dry, No rash or lesions noted.  Neuro: Oriented x 3. No gross motor deficits.  Psych: Awake and alert.  Very anxious appearing and speaking rapidly.    Emergency Department Course     ECG (08:03:10):  Rate 146 bpm. NE interval 114. QRS duration 84. QT/QTc 352/548. P-R-T axes 64 80 50. Sinus tachycardia. " Nonspecific ST abnormality. Abnormal ECG. Interpreted at 0812 by Ambar Shelton MD.    Imaging:  Radiology findings were communicated with the patient who voiced understanding of the findings.    Chest XR 2 Views:  No acute pulmonary disease.    DEREK JACKSON MD    Laboratory:  Laboratory findings were communicated with the patient who voiced understanding of the findings.    CBC: WNL (WBC 7.6, HGB 12.8, )  BMP: Glucose 103 (H), (WNL (Creatinine 0.69)    Troponin (Collected 0819): <0.015  D-dimer: 0.4    Emergency Department Course:  Nursing notes and vitals reviewed.    The patient was sent for a chest XR while in the emergency department, results above.     IV was inserted and blood was drawn for laboratory testing, results above.    0803: I performed an exam of the patient as documented above.   0933: Dr. Shelton spoke with Lillian from [facility] regarding patient presentation.     Findings and plan explained to the Patient. Patient discharged home with instructions regarding supportive care, medications, and reasons to return. The importance of close follow-up was reviewed.     Impression & Plan      Medical Decision Making:  Alexi Montemayor is a 33-year-old female who presents with chest pain.  Patient history and records reviewed.  Broad differential was considered including: Acute coronary syndrome, pulmonary embolism, pneumothorax, pneumonia, dissection, Boerhaave syndrome, referred GI pain, musculoskeletal chest wall pain etc.  On examination, the patient is tachycardic but with otherwise normal vitals.  She is significantly anxious appearing but has an otherwise normal exam.  EKG was obtained which demonstrates sinus tachycardia.  Chest x-ray and lab work including troponin and d-dimer were normal.    I suspect the patient's pain is most likely musculoskeletal chest wall pain, made worse by the patient's significant anxiety.  Exceedingly low risk for acute coronary syndrome given pleuritic and sharp  nature of chest pain, patient age and absence of risk factors, absence of exertional symptoms.  As noted above no evidence of ischemia on EKG and negative initial troponin do not feel that delta troponin is necessary at this time.  No risk factors for PE other than sinus tachycardia and d-dimer negative.  She has no abdominal tenderness or GI symptoms to suggest referred pain.  Recommended ibuprofen as needed for pain, and follow-up with primary care provider if symptoms not improving.  Discussed strategies to reduce anxiety, including exercise, meditation, and follow-up with primary for consideration of medication or therapy if not improving.  The patient has Discussed indications to return to the ED if new or worsening symptoms.    Diagnosis:    ICD-10-CM    1. Acute chest pain R07.9    2. Anxiety F41.9        Disposition:  discharged to home    Pina Charles  10/12/2018   [facility] EMERGENCY DEPARTMENT  Pina STERLING am serving as a scribe at 8:03 AM on 10/12/2018 to document services personally performed by Romie Mcadams PA-C based on my observations and the provider's statements to me.       Romie Mcadams PA-C  10/12/18 0974

## 2018-10-12 NOTE — ED AVS SNAPSHOT
[facility] Emergency Department    [address]    [facility] [address]    Phone:  [phone]    Fax:  [phone]                                       Alexi Montemayor   MRN: [id number]    Department:  [facility] Emergency Department   Date of Visit:  10/12/2018           After Visit Summary Signature Page     I have received my discharge instructions, and my questions have been answered. I have discussed any challenges I see with this plan with the nurse or doctor.    ..........................................................................................................................................  Patient/Patient Representative Signature      ..........................................................................................................................................  Patient Representative Print Name and Relationship to Patient    ..................................................               ................................................  Date                                   Time    ..........................................................................................................................................  Reviewed by Signature/Title    ...................................................              ..............................................  Date                                               Time          22EPIC Rev 08/18

## 2018-10-12 NOTE — ED NOTES
Emergency Department Attending Supervision Note  10/12/2018  8:30 AM      I evaluated this patient in conjunction with Romie Mcadams PA-C      Briefly, the patient presented with  ***      On my exam, ***    Results:    ED course:    My impression is ***        Diagnosis  No diagnosis found.      Ambar Shelton MD

## 2018-10-12 NOTE — ED AVS SNAPSHOT
[facility] Emergency Department    [address] [place] [address]    Phone:  [phone]    Fax:  [phone]                                       Alexi Montemayor   MRN: [id number]    Department:  [facility] Emergency Department   Date of Visit:  10/12/2018           Patient Information     Date Of Birth          1985        Your diagnoses for this visit were:     Acute chest pain     Anxiety        You were seen by Ambar Shelton MD.        Discharge Instructions         Treating Anxiety Disorders with Therapy    If you have an anxiety disorder, you don t have to suffer anymore. Treatment is available. Therapy (also called counseling) is often a helpful treatment for anxiety disorders. With therapy, a specially trained professional (therapist) helps you face and learn to manage your anxiety. Therapy can be short-term or long-term depending on your needs. In some cases, medicine may also be prescribed with therapy. It may take time before you notice how much therapy is helping, but stick with it. With therapy, you can feel better.  Cognitive behavioral therapy (CBT)  Cognitive behavioral therapy (CBT) teaches you to manage anxiety. It does this by helping you understand how you think and act when you re anxious. Research has shown CBT to be a very effective treatment for anxiety disorders. How CBT is run is almost like a class. It involves homework and activities to build skills that teach you to cope with anxiety step by step. It can be done in a group or one-on-one, and often takes place for a set number of sessions. CBT has two main parts:    Cognitive therapy helps you identify the negative, irrational thoughts that occur with your anxiety. You ll learn to replace these with more positive, realistic thoughts.    Behavioral therapy helps you change how you react to anxiety. You ll learn coping skills and methods for relaxing to help you better deal with  anxiety.  Other forms of therapy  Other therapy methods may work better for you than CBT. Or, you may move from CBT to another form of therapy as your treatment needs change. This may mean meeting with a therapist by yourself or in a group. Therapy can also help you work through problems in your life, such as drug or alcohol dependence, that may be making your anxiety worse.  Getting better takes time  Therapy will help you feel better and teach you skills to help manage anxiety long term. But change doesn t happen right away. It takes a commitment from you. And treatment only works if you learn to face the causes of your anxiety. So, you might feel worse before you feel better. This can sometimes make it hard to stick with it. But remember: Therapy is a very effective treatment. The results will be well worth it.  Helping yourself  If anxiety is wearing you down, here are some things you can do to cope:    Check with your doctor and rule out any physical problems that may be causing the anxiety symptoms.    If an anxiety disorder is diagnosed, seek mental healthcare. This is an illness and it can respond to treatment. Most types of anxiety disorders will respond to talk therapy and medicine.    Educate yourself about anxiety disorders. Keep track of helpful online resources and books you can use during stressful periods.    Try stress management techniques such as meditation.    Consider online or in-person support groups.    Don t fight your feelings. Anxiety feeds itself. The more you worry about it, the worse it gets. Instead, try to identify what might have triggered your anxiety. Then try to put this threat in perspective.    Keep in mind that you can t control everything about a situation. Change what you can and let the rest take its course.    Exercise -- it s a great way to relieve tension and help your body feel relaxed.    Examine your life for stress, and try to find ways to reduce it.    Avoid caffeine  and nicotine, which can make anxiety symptoms worse.    Fight the temptation to turn to alcohol or unprescribed drugs for relief. They only make things worse in the long run.   Date Last Reviewed: 1/1/2017 2000-2017 [employer]. [address] [place] [address], [place] [address]. All rights reserved. This information is not intended as a substitute for professional medical care. Always follow your healthcare professional's instructions.          24 Hour Appointment Hotline       To make an appointment at any [facility], call [phone] ([phone]). If you don't have a family doctor or clinic, we will help you find one. [facility] are conveniently located to serve the needs of you and your family.             Review of your medicines      Our records show that you are taking the medicines listed below. If these are incorrect, please call your family doctor or clinic.        Dose / Directions Last dose taken    clindamycin 300 MG capsule   Commonly known as:  CLEOCIN   Dose:  300 mg   Quantity:  21 capsule        Take 1 capsule (300 mg) by mouth 3 times daily   Refills:  0        metroNIDAZOLE 500 MG tablet   Commonly known as:  FLAGYL   Dose:  500 mg   Quantity:  14 tablet        Take 1 tablet (500 mg) by mouth 2 times daily   Refills:  0                Procedures and tests performed during your visit     Basic metabolic panel    CBC with platelets differential    D dimer quantitative    EKG 12 lead    Troponin I    XR Chest 2 Views      Orders Needing Specimen Collection     None      Pending Results     Date and Time Order Name Status Description    10/12/2018 0920 EKG 12 lead Preliminary             Pending Culture Results     No orders found from 10/10/2018 to 10/13/2018.            Pending Results Instructions     If you had any lab results that were not finalized at the time of your Discharge, you can call the ED Lab Result RN at [phone]. You will be contacted by this team for  any positive Lab results or changes in treatment. The nurses are available 7 days a week from 10A to 6:30P.  You can leave a message 24 hours per day and they will return your call.        Test Results From Your Hospital Stay        10/12/2018  8:28 AM      Narrative     XR CHEST 2 VW 10/12/2018 8:25 AM     HISTORY: Chest Pain;      COMPARISON: None available        Impression     IMPRESSION:  No acute pulmonary disease.    DEREK JACKSON MD         10/12/2018  8:30 AM      Component Results     Component Value Ref Range & Units Status    WBC 7.6 4.0 - 11.0 10e9/L Final    RBC Count 4.47 3.8 - 5.2 10e12/L Final    Hemoglobin 12.8 11.7 - 15.7 g/dL Final    Hematocrit 39.1 35.0 - 47.0 % Final    MCV 88 78 - 100 fl Final    MCH 28.6 26.5 - 33.0 pg Final    MCHC 32.7 31.5 - 36.5 g/dL Final    RDW 12.6 10.0 - 15.0 % Final    Platelet Count 276 150 - 450 10e9/L Final    Diff Method Automated Method  Final    % Neutrophils 48.9 % Final    % Lymphocytes 39.6 % Final    % Monocytes 9.0 % Final    % Eosinophils 1.7 % Final    % Basophils 0.4 % Final    % Immature Granulocytes 0.4 % Final    Nucleated RBCs 0 0 /100 Final    Absolute Neutrophil 3.7 1.6 - 8.3 10e9/L Final    Absolute Lymphocytes 3.0 0.8 - 5.3 10e9/L Final    Absolute Monocytes 0.7 0.0 - 1.3 10e9/L Final    Absolute Eosinophils 0.1 0.0 - 0.7 10e9/L Final    Absolute Basophils 0.0 0.0 - 0.2 10e9/L Final    Abs Immature Granulocytes 0.0 0 - 0.4 10e9/L Final    Absolute Nucleated RBC 0.0  Final         10/12/2018  8:51 AM      Component Results     Component Value Ref Range & Units Status    Sodium 141 133 - 144 mmol/L Final    Potassium 3.5 3.4 - 5.3 mmol/L Final    Chloride 109 94 - 109 mmol/L Final    Carbon Dioxide 22 20 - 32 mmol/L Final    Anion Gap 10 3 - 14 mmol/L Final    Glucose 103 (H) 70 - 99 mg/dL Final    Urea Nitrogen 12 7 - 30 mg/dL Final    Creatinine 0.69 0.52 - 1.04 mg/dL Final    GFR Estimate >90 >60 mL/min/1.7m2 Final    Non African American GFR  Calc    GFR Estimate If Black >90 >60 mL/min/1.7m2 Final    African American GFR Calc    Calcium 8.6 8.5 - 10.1 mg/dL Final         10/12/2018  8:51 AM      Component Results     Component Value Ref Range & Units Status    Troponin I ES <0.015 0.000 - 0.045 ug/L Final    The 99th percentile for upper reference range is 0.045 ug/L.  Troponin values   in the range of 0.045 - 0.120 ug/L may be associated with risks of adverse   clinical events.           10/12/2018  9:21 AM      Component Results     Component Value Ref Range & Units Status    D Dimer 0.4 0.0 - 0.50 ug/ml FEU Final    This D-dimer assay is intended for use in conjunction with a clinical pretest   probability assessment model to exclude pulmonary embolism (PE) and deep   venous thrombosis (DVT) in outpatients suspected of PE or DVT. The cut-off   value is 0.5 ug/mL FEU.                  Clinical Quality Measure: Blood Pressure Screening     Your blood pressure was checked while you were in the emergency department today. The last reading we obtained was  BP: 118/65 . Please read the guidelines below about what these numbers mean and what you should do about them.  If your systolic blood pressure (the top number) is less than 120 and your diastolic blood pressure (the bottom number) is less than 80, then your blood pressure is normal. There is nothing more that you need to do about it.  If your systolic blood pressure (the top number) is 120-139 or your diastolic blood pressure (the bottom number) is 80-89, your blood pressure may be higher than it should be. You should have your blood pressure rechecked within a year by a primary care provider.  If your systolic blood pressure (the top number) is 140 or greater or your diastolic blood pressure (the bottom number) is 90 or greater, you may have high blood pressure. High blood pressure is treatable, but if left untreated over time it can put you at risk for heart attack, stroke, or kidney failure. You  "should have your blood pressure rechecked by a primary care provider within the next 4 weeks.  If your provider in the emergency department today gave you specific instructions to follow-up with your doctor or provider even sooner than that, you should follow that instruction and not wait for up to 4 weeks for your follow-up visit.        Thank you for choosing [facility]       Thank you for choosing [facility] for your care. Our goal is always to provide you with excellent care. Hearing back from our patients is one way we can continue to improve our services. Please take a few minutes to complete the written survey that you may receive in the mail after you visit with us. Thank you!        [employer] Information     [employer] lets you send messages to your doctor, view your test results, renew your prescriptions, schedule appointments and more. To sign up, go to [email or web address] . Click on \"Log in\" on the left side of the screen, which will take you to the Welcome page. Then click on \"Sign up Now\" on the right side of the page.     You will be asked to enter the access code listed below, as well as some personal information. Please follow the directions to create your username and password.     Your access code is: [email or web address]  Expires: 11/10/2018  4:37 PM     Your access code will  in 90 days. If you need help or a new code, please call your [facility] clinic or [phone].        Care EveryWhere ID     This is your Care EveryWhere ID. This could be used by other organizations to access your [facility] medical records  [id number]        Equal Access to Services     ALYCE ORTIZ : Hadii bambi lamb hadasho Sopauloali, waaxda luqadaha, qaybta kaalmada myrnayada, avelina goodrich. So [facility] [phone].    ATENCIÓN: Si habla español, tiene a cormier disposición servicios gratuitos de asistencia lingüística. Llame al [phone].    We comply with applicable federal civil rights laws and [place] " laws. We do not discriminate on the basis of race, color, national origin, age, disability, sex, sexual orientation, or gender identity.            After Visit Summary       This is your record. Keep this with you and show to your community pharmacist(s) and doctor(s) at your next visit.

## 2018-11-12 ENCOUNTER — OFFICE VISIT (OUTPATIENT)
Dept: FAMILY MEDICINE | Facility: CLINIC | Age: 33
End: 2018-11-12
Payer: COMMERCIAL

## 2018-11-12 VITALS
BODY MASS INDEX: 29.41 KG/M2 | RESPIRATION RATE: 16 BRPM | WEIGHT: 166 LBS | SYSTOLIC BLOOD PRESSURE: 122 MMHG | HEART RATE: 110 BPM | TEMPERATURE: 98 F | HEIGHT: 63 IN | DIASTOLIC BLOOD PRESSURE: 70 MMHG | OXYGEN SATURATION: 100 %

## 2018-11-12 DIAGNOSIS — M79.18 MYOFASCIAL MUSCLE PAIN: Primary | ICD-10-CM

## 2018-11-12 PROCEDURE — 99213 OFFICE O/P EST LOW 20 MIN: CPT | Performed by: FAMILY MEDICINE

## 2018-11-12 NOTE — MR AVS SNAPSHOT
After Visit Summary   11/12/2018    Alexi Montemayor    MRN: [id number]           Patient Information     Date Of Birth          1985        Visit Information        Provider Department      11/12/2018 4:00 PM Stna Garcia MD [facility]        Today's Diagnoses     Myofascial muscle pain    -  1      Care Instructions                  Neck Spasm Rehabilitation Exercises   You may do all of these exercises right away but avoid any movements that increase your pain.     Neck rotation with flexion:   Right: Turn your head to the right and clasp your hands behind your head. Let the weight of your arms pull your chin to the right side of your chest. Relax. Hold for a count of 15. Do this 3 times.   Left: Turn your head to the left and clasp your hands behind your head. Let the weight of your arms pull your chin to the left side of your chest. Relax. Hold for a count of 15. Do this 3 times.     Chin tuck: Place your fingertips on your chin and gently push your head straight back as if you are trying to make a double chin. Keep looking forward as your head moves back. Hold 5 seconds and repeat 5 times.     Scalene stretch: This stretches the neck muscles that attach to your ribs. Sitting in an upright position, clasp both hands behind your back, lower your left shoulder, and tilt your head toward the right. Hold this position for 15 to 30 seconds and then come back to the starting position. Lower your right shoulder and tilt your head toward the left until you feel a stretch. Hold for 15 to 30 seconds. Repeat 3 times on each side.     Neck rotation stretch   Right side: Rotate your neck by looking over your right shoulder. Lift your right hand and place your palm on the left side of your chin. Push your chin with your palm toward your right shoulder. Hold for a count of 10. Do this 3 times.   Left side: Rotate your neck by looking over your left shoulder. Lift your left hand and place  your palm on the right side of your chin. Push your chin with your palm toward your left shoulder. Hold for a count of 10. Do this 3 times.     Scapular squeeze: While sitting or standing with your arms by your sides, squeeze your shoulder blades together and hold for 5 seconds. Do 3 sets of 10.     Thoracic extension: While sitting in a chair, clasp both arms behind your head. Gently arch backward and look up toward the ceiling. Repeat 10 times. Do this several times per day.                      Follow-ups after your visit        Additional Services     [facility] PT, HAND, AND CHIROPRACTIC REFERRAL       Physical Therapy, Hand Therapy and Chiropractic Care are available through:  *[facility]  *Hand Therapy (Occupational Therapy or Physical Therapy)  *[facility]    Call one number to schedule at any of the above locations: [phone].    Physical therapy, Hand therapy and/or Chiropractic care has been recommended by your physician as an excellent treatment option to reduce pain and help people return to normal activities, including sports.  Therapy and/or chiropractic care services are a great complement or alternative to expensive and invasive surgery, injections, or long-term use of prescription medications. The primary goal is to identify the underlying problem and provide you the tools to manage your condition on your own.     Please be aware that coverage of these services is subject to the terms and limitations of your health insurance plan.  Call member services at your health plan with any benefit or coverage questions.      Please bring the following to your appointment:  *Your personal calendar for scheduling future appointments  *Comfortable clothing                  Future tests that were ordered for you today     Open Future Orders        Priority Expected Expires Ordered    [facility] PT, HAND, AND CHIROPRACTIC REFERRAL Routine  11/12/2019 11/12/2018            Who  "to contact     If you have questions or need follow up information about today's clinic visit or your schedule please contact [facility] directly at [phone].  Normal or non-critical lab and imaging results will be communicated to you by MyChart, letter or phone within 4 business days after the clinic has received the results. If you do not hear from us within 7 days, please contact the clinic through MyChart or phone. If you have a critical or abnormal lab result, we will notify you by phone as soon as possible.  Submit refill requests through "[employer]" or call your pharmacy and they will forward the refill request to us. Please allow 3 business days for your refill to be completed.          Additional Information About Your Visit        Care EveryWhere ID     This is your Care EveryWhere ID. This could be used by other organizations to access your [facility] medical records  [id number]        Your Vitals Were     Pulse Temperature Respirations Height Last Period Pulse Oximetry    110 98  F (36.7  C) (Oral) 16 5' 3\" (1.6 m) 10/25/2018 (Approximate) 100%    Breastfeeding? BMI (Body Mass Index)                No 29.41 kg/m2           Blood Pressure from Last 3 Encounters:   11/12/18 122/70   10/12/18 118/65   08/12/18 123/78    Weight from Last 3 Encounters:   11/12/18 166 lb (75.3 kg)   10/12/18 160 lb (72.6 kg)   08/12/18 161 lb 8 oz (73.3 kg)               Primary Care Provider Office Phone # Fax #    Judie Marcia Stern [phone]       [facility] [address] [place] [address]        Equal Access to Services     [facility] DIANA AH: Hadii aad ku hadasho Sopauloali, waaxda luqadaha, qaybta kaalmada andra, avelina goodrich. So [facility] [phone].    ATENCIÓN: Si habla español, tiene a cormier disposición servicios gratuitos de asistencia lingüística. Hanselame al [phone].    We comply with applicable federal civil rights laws and [place] " laws. We do not discriminate on the basis of race, color, national origin, age, disability, sex, sexual orientation, or gender identity.            Thank you!     Thank you for choosing [facility]  for your care. Our goal is always to provide you with excellent care. Hearing back from our patients is one way we can continue to improve our services. Please take a few minutes to complete the written survey that you may receive in the mail after your visit with us. Thank you!             Your Updated Medication List - Protect others around you: Learn how to safely use, store and throw away your medicines at www.disposemymeds.org.      Notice  As of 11/12/2018  4:21 PM    You have not been prescribed any medications.

## 2018-11-12 NOTE — PROGRESS NOTES
"  SUBJECTIVE:   Alexi Montemayor is a 33 year old female who presents to clinic today for the following health issues:    Patient reports intermittent head pain during the past week. The pain is located at the back of her head and occurs with specific head movements. The pain worsens while at work and with anxiety. She wonders if this is related to neck tension caused by working on the computer all day. Patient has noticed tinnitus in the mornings as well. She is taking ibuprofen. Denies radiating arm pain, numbness or tingling.     Problem list and histories reviewed & adjusted, as indicated.  Additional history: as documented    There is no problem list on file for this patient.    History reviewed. No pertinent surgical history.    Social History   Substance Use Topics     Smoking status: Never Smoker     Smokeless tobacco: Never Used     Alcohol use Yes      Comment: Rarely     Family History   Problem Relation Age of Onset     Glaucoma No family hx of      Macular Degeneration No family hx of            Reviewed and updated as needed this visit by clinical staff  Tobacco  Allergies  Med Hx  Surg Hx  Fam Hx  Soc Hx      Reviewed and updated as needed this visit by Provider         ROS:  ENT/MOUTH: as noted above   MUSCULOSKELETAL: as noted above   NEURO: as noted above   PSYCHIATRIC: as noted above     This document serves as a record of the services and decisions personally performed and made by Stan Garcia MD. It was created on his behalf by Laney Ruiz, a trained medical scribe. The creation of this document is based on the provider's statements to the medical scribe.  Laney Ruiz 4:06 PM November 12, 2018    OBJECTIVE:     /70 (BP Location: Right arm, Patient Position: Chair, Cuff Size: Adult Regular)  Pulse 110  Temp 98  F (36.7  C) (Oral)  Resp 16  Ht 1.6 m (5' 3\")  Wt 75.3 kg (166 lb)  LMP 10/25/2018 (Approximate)  SpO2 100%  Breastfeeding? No  BMI 29.41 kg/m2  Body mass index is " 29.41 kg/(m^2).  GENERAL: healthy, alert and no distress  HENT: ear canals and TM's normal  MS: tight upper trapezius muscles bilaterally, R>L restriction to side flexion, normal strength and sensation    ASSESSMENT/PLAN:     1. Myofascial muscle pain  Discussed conservative care for neck muscle tightness issue causing tension headache. Return if worsening symptoms or radicular symptoms. Consider massage, physical therapy if not improving. Can use ice, heat, NSAIDS, muscle relaxer as needed. Stretching exercise and stretching handout given.   - [facility] PT, HAND, AND CHIROPRACTIC REFERRAL; Future    The information in this document, created by the medical scribe for me, accurately reflects the services I personally performed and the decisions made by me. I have reviewed and approved this document for accuracy prior to leaving the patient care area.  November 12, 2018 4:22 PM    Stan Garcia MD  [facility]

## 2018-11-13 PROBLEM — F41.9 ANXIETY: Status: ACTIVE | Noted: 2017-06-21

## 2018-11-21 ENCOUNTER — THERAPY VISIT (OUTPATIENT)
Dept: PHYSICAL THERAPY | Facility: CLINIC | Age: 33
End: 2018-11-21
Payer: COMMERCIAL

## 2018-11-21 DIAGNOSIS — M54.2 NECK PAIN: Primary | ICD-10-CM

## 2018-11-21 DIAGNOSIS — M79.18 MYOFASCIAL MUSCLE PAIN: ICD-10-CM

## 2018-11-21 PROCEDURE — 97161 PT EVAL LOW COMPLEX 20 MIN: CPT | Mod: GP | Performed by: PHYSICAL THERAPIST

## 2018-11-21 PROCEDURE — 97110 THERAPEUTIC EXERCISES: CPT | Mod: GP | Performed by: PHYSICAL THERAPIST

## 2018-11-21 NOTE — PROGRESS NOTES
[facility] Initial Evaluation  Alexi Montemayor is a 33 year old  female referred to physical therapy by Dr. Garcia for treatment of neck pain with Precautions/Restrictions/MD instructions eval and treat   Physical Therapy Initial Evaluation: Subjective History      Injury/Condition Details:  Presenting Complaint Neck pain   Onset Timing/Date October 2018    Mechanism Insidious onset without acute precipitating event      Symptom Behavior Details    Primary Pain Symptoms Location: Base of skull  Quality: ache sometimes sharp   Frequency: Intermittent   Worst Pain 5/10   Best Pain 0/10   Symptom Provocators Stress at work, cervical rotation   Symptom Relievers Rest, massage   Time of day dependent? No   Recent symptom change? Small improvement since onset      Prior Testing/Intervention for current condition:  Prior Tests None   Prior Treatment Massage treatment with some benefit      Lifestyle & General Medical History:  General Health Reported by Patient good   Employment Pharmacy tech   Usual physical activities  (within past year) Swimming, walking   Orthopaedic history None   Notable medical history anxiety       HPI                    Objective:  Standing Alignment:    Cervical/Thoracic:  Forward head  Shoulder/UE:  Rounded shoulders                                  Cervical/Thoracic Evaluation    AROM:  AROM Cervical:    Flexion:            75%, mild pain  Extension:       90%, mild pain  Rotation:         Left: 90%, painfree     Right: 90%, painfree  Side Bend:      Left: 75%, mild pain     Right:  75%, mild pain      Headaches: cervical  Cervical Myotomes:  normal                        Cervical Palpation:    Tenderness present at Left:    Upper Trap; Levator and Suboccipitals  Tenderness present at Right:    Upper Trap; Levator and Suboccipitals      Spinal Segmental Conclusions:    Level:  Hypo at C7, T1, T2 and T3                                                General      ROS    Assessment/Plan:    Patient is a 33 year old female with cervical complaints.    Patient has the following significant findings with corresponding treatment plan.                Diagnosis 1:  Neck pain  Pain -  manual therapy, splint/taping/bracing/orthotics, self management, education, directional preference exercise and home program  Decreased joint mobility - manual therapy, therapeutic exercise, therapeutic activity and home program  Decreased strength - therapeutic exercise, therapeutic activities and home program  Decreased proprioception - neuro re-education, therapeutic activities and home program  Impaired muscle performance - neuro re-education and home program    Therapy Evaluation Codes:   1) History comprised of:   Personal factors that impact the plan of care:      None.    Comorbidity factors that impact the plan of care are:      Anxiety.     Medications impacting care: None.  2) Examination of Body Systems comprised of:   Body structures and functions that impact the plan of care:      Cervical spine.   Activity limitations that impact the plan of care are:      Lifting, Reading/Computer work and Working.  3) Clinical presentation characteristics are:   Stable/Uncomplicated.  4) Decision-Making    Low complexity using standardized patient assessment instrument and/or measureable assessment of functional outcome.  Cumulative Therapy Evaluation is: Low complexity.    Previous and current functional limitations:  (See Goal Flow Sheet for this information)    Short term and Long term goals: (See Goal Flow Sheet for this information)     Communication ability:  Patient appears to be able to clearly communicate and understand verbal and written communication and follow directions correctly.  Treatment Explanation - The following has been discussed with the patient:   RX ordered/plan of care  Anticipated outcomes  Possible risks and side effects  This patient would benefit from PT intervention  to resume normal activities.   Rehab potential is good.    Frequency:  1 X week, once daily  Duration:  for 6 weeks  Discharge Plan:  Achieve all LTG.  Independent in home treatment program.  Reach maximal therapeutic benefit.    Please refer to the daily flowsheet for treatment today, total treatment time and time spent performing 1:1 timed codes.

## 2018-11-21 NOTE — MR AVS SNAPSHOT
After Visit Summary   11/21/2018    Alexi Montemayor    MRN: [id number]           Patient Information     Date Of Birth          1985        Visit Information        Provider Department      11/21/2018 8:00 AM Samir Chowdary, CARL[facility] Physical Therapy        Today's Diagnoses     Neck pain    -  1    Myofascial muscle pain           Follow-ups after your visit        Your next 10 appointments already scheduled     Dec 07, 2018  4:10 PM CST   [facility] with Samir Chowdary PT   [facility] [place] ([facility] [place]  )    [address] [facility]  [address] [place] [address]   [phone]              Who to contact     If you have questions or need follow up information about today's clinic visit or your schedule please contact [facility] PHYSICAL THERAP[facility] directly at [phone].  Normal or non-critical lab and imaging results will be communicated to you by MyChart, letter or phone within 4 business days after the clinic has received the results. If you do not hear from us within 7 days, please contact the clinic through MyChart or phone. If you have a critical or abnormal lab result, we will notify you by phone as soon as possible.  Submit refill requests through [employer] or call your pharmacy and they will forward the refill request to us. Please allow 3 business days for your refill to be completed.          Additional Information About Your Visit        Care EveryWhere ID     This is your Care EveryWhere ID. This could be used by other organizations to access your [facility] medical records  [id number]        Your Vitals Were     Last Period                   10/25/2018 (Approximate)            Blood Pressure from Last 3 Encounters:   11/12/18 122/70   10/12/18 118/65   08/12/18 123/78    Weight from Last 3 Encounters:   11/12/18 75.3 kg (166 lb)   10/12/18 72.6 kg (160 lb)   08/12/18 73.3 kg (161 lb 8 oz)              We  Performed the Following     HC PT EVAL, LOW COMPLEXITY     [facility] INITIAL EVAL REPORT     [facility] PT, HAND, AND CHIROPRACTIC REFERRAL     THERAPEUTIC EXERCISES        Primary Care Provider Office Phone # Fax #    Judie Stern [phone]       [facility] [address] [place] [address]        Equal Access to Services     [facility]: Hadii aad ku hadasho Soomaali, waaxda luqadaha, qaybta kaalmada adeegyada, waxay idiin hayaan adeeg kharash la'aan . So [facility] [phone].    ATENCIÓN: Si habla español, tiene a cormier disposición servicios gratuitos de asistencia lingüística. Llame al [phone].    We comply with applicable federal civil rights laws and [place] laws. We do not discriminate on the basis of race, color, national origin, age, disability, sex, sexual orientation, or gender identity.            Thank you!     Thank you for choosing [facility] PHYSICAL THERAP[facility]  for your care. Our goal is always to provide you with excellent care. Hearing back from our patients is one way we can continue to improve our services. Please take a few minutes to complete the written survey that you may receive in the mail after your visit with us. Thank you!             Your Updated Medication List - Protect others around you: Learn how to safely use, store and throw away your medicines at www.disposemymeds.org.      Notice  As of 11/21/2018  9:15 AM    You have not been prescribed any medications.

## 2018-12-07 ENCOUNTER — THERAPY VISIT (OUTPATIENT)
Dept: PHYSICAL THERAPY | Facility: CLINIC | Age: 33
End: 2018-12-07
Payer: COMMERCIAL

## 2018-12-07 DIAGNOSIS — M79.18 MYOFASCIAL MUSCLE PAIN: ICD-10-CM

## 2018-12-07 DIAGNOSIS — M54.2 NECK PAIN: ICD-10-CM

## 2018-12-07 PROCEDURE — 97530 THERAPEUTIC ACTIVITIES: CPT | Mod: GP | Performed by: PHYSICAL THERAPIST

## 2018-12-07 PROCEDURE — 99207 C STAT DRY NEEDLING - IAM: CPT | Mod: 25 | Performed by: PHYSICAL THERAPIST

## 2019-01-09 ENCOUNTER — THERAPY VISIT (OUTPATIENT)
Dept: PHYSICAL THERAPY | Facility: CLINIC | Age: 34
End: 2019-01-09
Payer: COMMERCIAL

## 2019-01-09 DIAGNOSIS — M54.2 NECK PAIN: ICD-10-CM

## 2019-01-09 DIAGNOSIS — M79.18 MYOFASCIAL MUSCLE PAIN: ICD-10-CM

## 2019-01-09 PROCEDURE — 97110 THERAPEUTIC EXERCISES: CPT | Mod: GP | Performed by: PHYSICAL THERAPIST

## 2019-01-09 PROCEDURE — 97140 MANUAL THERAPY 1/> REGIONS: CPT | Mod: GP | Performed by: PHYSICAL THERAPIST

## 2019-01-09 PROCEDURE — 97112 NEUROMUSCULAR REEDUCATION: CPT | Mod: GP | Performed by: PHYSICAL THERAPIST

## 2019-01-11 ENCOUNTER — OFFICE VISIT - HEALTHEAST (OUTPATIENT)
Dept: AUDIOLOGY | Facility: CLINIC | Age: 34
End: 2019-01-11

## 2019-01-11 ENCOUNTER — OFFICE VISIT - HEALTHEAST (OUTPATIENT)
Dept: OTOLARYNGOLOGY | Facility: CLINIC | Age: 34
End: 2019-01-11

## 2019-01-11 DIAGNOSIS — H93.12 TINNITUS, LEFT: ICD-10-CM

## 2019-01-11 DIAGNOSIS — H93.13 TINNITUS OF BOTH EARS: ICD-10-CM

## 2019-01-21 ENCOUNTER — OFFICE VISIT (OUTPATIENT)
Dept: BEHAVIORAL HEALTH | Facility: CLINIC | Age: 34
End: 2019-01-21
Payer: COMMERCIAL

## 2019-01-21 DIAGNOSIS — F41.1 GAD (GENERALIZED ANXIETY DISORDER): Primary | ICD-10-CM

## 2019-01-21 DIAGNOSIS — F41.0 PANIC DISORDER WITHOUT AGORAPHOBIA: ICD-10-CM

## 2019-01-21 DIAGNOSIS — F32.0 CURRENT MILD EPISODE OF MAJOR DEPRESSIVE DISORDER WITHOUT PRIOR EPISODE (H): ICD-10-CM

## 2019-01-21 PROCEDURE — 90839 PSYTX CRISIS INITIAL 60 MIN: CPT | Performed by: COUNSELOR

## 2019-01-21 ASSESSMENT — ANXIETY QUESTIONNAIRES
IF YOU CHECKED OFF ANY PROBLEMS ON THIS QUESTIONNAIRE, HOW DIFFICULT HAVE THESE PROBLEMS MADE IT FOR YOU TO DO YOUR WORK, TAKE CARE OF THINGS AT HOME, OR GET ALONG WITH OTHER PEOPLE: SOMEWHAT DIFFICULT
1. FEELING NERVOUS, ANXIOUS, OR ON EDGE: MORE THAN HALF THE DAYS
2. NOT BEING ABLE TO STOP OR CONTROL WORRYING: SEVERAL DAYS
5. BEING SO RESTLESS THAT IT IS HARD TO SIT STILL: SEVERAL DAYS
6. BECOMING EASILY ANNOYED OR IRRITABLE: NOT AT ALL
3. WORRYING TOO MUCH ABOUT DIFFERENT THINGS: MORE THAN HALF THE DAYS
GAD7 TOTAL SCORE: 9
7. FEELING AFRAID AS IF SOMETHING AWFUL MIGHT HAPPEN: SEVERAL DAYS

## 2019-01-21 ASSESSMENT — PATIENT HEALTH QUESTIONNAIRE - PHQ9
5. POOR APPETITE OR OVEREATING: MORE THAN HALF THE DAYS
SUM OF ALL RESPONSES TO PHQ QUESTIONS 1-9: 7

## 2019-01-21 NOTE — PATIENT INSTRUCTIONS
"                                           Alexi Johnsonman     SAFETY PLAN:  Step 1: Warning signs / cues (Thoughts, images, mood, situation, behavior) that a crisis may be developing:    Thoughts: \"I can't do this anymore\", \"I just want this to end\" and \" I just don't want to be anxious anymore\"    Images: visions of harm: how I might harm myself    Thinking Processes: ruminations (can't stop thinking about my problems)  , intrusive thoughts (bothersome, unwanted thoughts that come out of nowhere)   and highly critical and negative thoughts    Mood: worsening depression, hopelessness and intense worry    Behaviors: not taking care of my responsibilities    Situations: loss: mom in nursing care facility , anniversary of sons accident, pain, relationship problems, trauma  and financial stress   Step 2: Coping strategies - Things I can do to take my mind off of my problems without contacting another person (relaxation technique, physical activity):    Distress Tolerance Strategies:  play with my pet , listen to positive and upbeat music: Yazdanism, watch a funny movie: Grey Anatomy, pray, paced breathing/progressive muscle relaxation and intense exercise: walking for 2-3 minutes     Physical Activities: go for a walk, exercise: yoga, yoga, gardening, deep breathing and stretching     Focus on helpful thoughts:  \"This is temporary\", \"I will get through this\", \"It always passes\", remind myself of what is important to me: son and self-compassion statements: \" You are doing fine\"  Step 3: People and social settings that provide distraction:   Name: Aileen Phone: [phone]   Name: Cierra Phone: [phone]       movie theater, gym , [employer] and walking around outside    Step 4: Remind myself of people and things that are important to me and worth living for:  Son, online classes, physical health , getting rid of anxiety, travel       Step 5: When I am in crisis, I can ask these people to help me use my safety plan:   " Name: Miguel Phone: [phone]   Step 6: Making the environment safe:     remove things I could use to hurt myself:   and be around others  Step 7: Professionals or agencies I can contact during a crisis:    [facility] Daytime and After Hours Crisis Number: [phone]    Suicide Prevention Lifeline: 2-176-390-TALK (8255)    Crisis Text Line Service (available 24 hours a day, 7 days a week): Text [place] to 727804  [facility] Crisis Services: [phone]    Call 911 or go to my nearest emergency department.   I helped develop this safety plan and agree to use it when needed.  I have been given a copy of this plan.      Client signature _________________________________________________________________  Today s date:  1/21/2019  Adapted from Safety Plan Template 2008 Munira Cage and Andrea Stern is reprinted with the express permission of the authors.  No portion of the Safety Plan Template may be reproduced without the express, written permission.  You can contact the authors at [email or web address] or [email or web address].   [employer]

## 2019-01-21 NOTE — PROGRESS NOTES
Progress Note - Initial Session    Client Name:  Alexi Montemayor Date: 1/21/2019           Service Type: Individual      Session Start Time: 3:30pm  Session End Time: 4:30pm       Session Length: 53 - 60      Session #: 1     Attendees: Client attended alone         Diagnostic Assessment in progress.  Unable to complete documentation at the conclusion of the first session due to creation of a safety plan due to  client scoring 1 on PHQ9 #9.       Mental Status Assessment:  Appearance:   Appropriate   Eye Contact:   Good   Psychomotor Behavior: Normal   Attitude:   Cooperative   Orientation:   All  Speech   Rate / Production: Normal    Volume:  Normal   Mood:    Anxious  Normal  Affect:    Appropriate   Thought Content:  Clear  Rumination   Thought Form:  Coherent   Insight:    Fair       Safety Issues and Plan for Safety and Risk Management:  Client denies current fears or concerns for personal safety.  Client reports the following current or recent suicidal ideation or behaviors: intrusive thoughts of harming self but denies plan or intent .  Client denies current or recent homicidal ideation or behaviors.  Client denies current or recent self injurious behavior or ideation.  Client denies other safety concerns.  A safety and risk management plan has been developed including: Client consented to co-developed safety plan.  [facility]'s safety and risk management plan was completed.  Client agreed to use safety plan should any safety concerns arise.  A copy was given to the patient.  Client reports there are firearms in the house. The firearms are secured in a locked space.      Diagnostic Criteria:  A. Excessive anxiety and worry about a number of events or activities (such as work or school performance).   B. The person finds it difficult to control the worry.  C. Select 3 or more symptoms (required for diagnosis). Only one item is required in children.   - Restlessness or feeling keyed up or on edge.    -  Being easily fatigued.    - Difficulty concentrating or mind going blank.    - Irritability.    - Muscle tension.    - Sleep disturbance (difficulty falling or staying asleep, or restless unsatisfying sleep).   1. Recurrent unexpected panic attacks and meets criteria 2, 3, and 4 (below)  2. At least one of the attacks has been followed by 1 month (or more) of one (or more) of the following:     (a) persistent concern about having additional attacks     (b) worry about the implications of the attack or its consequences     (c) a significant change in behavior related to the attacks  3. Absence of agoraphobia  4. The panic attacks are not to the the direct physiological effects of a substance or general medical condition  5. The panic attacks are not better accounted for by another mental disorder, such as social phobia, specific phobia, OCD, PTSD, or separation anxiety disorder  A) Single episode - symptoms have been present during the same 2-week period and represent a change from previous functioning 5 or more symptoms (required for diagnosis)   - Depressed mood. Note: In children and adolescents, can be irritable mood.     - Diminished interest or pleasure in all, or almost all, activities.    - Fatigue or loss of energy.    - Diminished ability to think or concentrate, or indecisiveness.         DSM5 Diagnoses: (Sustained by DSM5 Criteria Listed Above)  Diagnoses: 296.21 (F32.0) Major Depressive Disorder, Single Episode, Mild _  300.01 (F41.0) Panic Disorder  300.02 (F41.1) Generalized Anxiety Disorder  Psychosocial & Contextual Factors: health, work, marriage   WHODAS 2.0 (12 item)To be completed next session at completion of DA    Intervention: motivational interviewing creating the spirit of partnership to build rapport  Also crisis intervention using safety plan              Collateral Reports Completed:  Routed note to PCP after DA completion       PLAN: (Homework, other):  Client stated that she may follow  up for ongoing services with [facility].  F/u 1/30/19      Michelle Santos, [facility] 1/21/2019

## 2019-01-22 ENCOUNTER — TELEPHONE (OUTPATIENT)
Dept: FAMILY MEDICINE | Facility: CLINIC | Age: 34
End: 2019-01-22

## 2019-01-22 ASSESSMENT — ANXIETY QUESTIONNAIRES: GAD7 TOTAL SCORE: 9

## 2019-01-22 NOTE — TELEPHONE ENCOUNTER
Panel Management Review       Patient has the following on her problem list: None      Composite cancer screening  Chart review shows that this patient is due/due soon for the following Pap Smear  Summary:    Patient is due/failing the following:   PAP    Action needed:   Patient needs office visit for px- pap.    Type of outreach:    Phone, left message for patient to call back.     Questions for provider review:    None                                                                                                                                    Carol Hammer CMA       Chart routed to none .

## 2019-02-05 ENCOUNTER — TELEPHONE (OUTPATIENT)
Dept: BEHAVIORAL HEALTH | Facility: CLINIC | Age: 34
End: 2019-02-05

## 2019-02-06 ENCOUNTER — OFFICE VISIT (OUTPATIENT)
Dept: DERMATOLOGY | Facility: CLINIC | Age: 34
End: 2019-02-06
Payer: COMMERCIAL

## 2019-02-06 VITALS — HEART RATE: 118 BPM | OXYGEN SATURATION: 99 % | SYSTOLIC BLOOD PRESSURE: 139 MMHG | DIASTOLIC BLOOD PRESSURE: 87 MMHG

## 2019-02-06 DIAGNOSIS — D22.9 MULTIPLE BENIGN NEVI: ICD-10-CM

## 2019-02-06 DIAGNOSIS — D48.5 NEOPLASM OF UNCERTAIN BEHAVIOR OF SKIN: Primary | ICD-10-CM

## 2019-02-06 PROCEDURE — 88305 TISSUE EXAM BY PATHOLOGIST: CPT | Mod: TC | Performed by: PHYSICIAN ASSISTANT

## 2019-02-06 PROCEDURE — 99204 OFFICE O/P NEW MOD 45 MIN: CPT | Mod: 25 | Performed by: PHYSICIAN ASSISTANT

## 2019-02-06 PROCEDURE — 11102 TANGNTL BX SKIN SINGLE LES: CPT | Performed by: PHYSICIAN ASSISTANT

## 2019-02-06 NOTE — PATIENT INSTRUCTIONS
Wound Care Instructions     FOR SUPERFICIAL WOUNDS     [facility] [phone]    [facility] [phone]          AFTER 24 HOURS YOU SHOULD REMOVE THE BANDAGE AND BEGIN DAILY DRESSING CHANGES AS FOLLOWS:     1) Remove Dressing.     2) Clean and dry the area with tap water using a Q-tip or sterile gauze pad.     3) Apply Vaseline, Aquaphor, Polysporin ointment or Bacitracin ointment over entire wound.  Do NOT use Neosporin ointment.     4) Cover the wound with a band-aid, or a sterile non-stick gauze pad and micropore paper tape      REPEAT THESE INSTRUCTIONS AT LEAST ONCE A DAY UNTIL THE WOUND HAS COMPLETELY HEALED.    It is an old wives tale that a wound heals better when it is exposed to air and allowed to dry out. The wound will heal faster with a better cosmetic result if it is kept moist with ointment and covered with a bandage.    **Do not let the wound dry out.**      Supplies Needed:      *Cotton tipped applicators (Q-tips)    *Polysporin Ointment or Bacitracin Ointment (NOT NEOSPORIN)    *Band-aids or non-stick gauze pads and micropore paper tape.      PATIENT INFORMATION:    During the healing process you will notice a number of changes. All wounds develop a small halo of redness surrounding the wound.  This means healing is occurring. Severe itching with extensive redness usually indicates sensitivity to the ointment or bandage tape used to dress the wound.  You should call our office if this develops.      Swelling  and/or discoloration around your surgical site is common, particularly when performed around the eye.    All wounds normally drain.  The larger the wound the more drainage there will be.  After 7-10 days, you will notice the wound beginning to shrink and new skin will begin to grow.  The wound is healed when you can see skin has formed over the entire area.  A healed wound has a healthy, shiny look to the surface and is red to dark pink in color to  normalize.  Wounds may take approximately 4-6 weeks to heal.  Larger wounds may take 6-8 weeks.  After the wound is healed you may discontinue dressing changes.    You may experience a sensation of tightness as your wound heals. This is normal and will gradually subside.    Your healed wound may be sensitive to temperature changes. This sensitivity improves with time, but if you re having a lot of discomfort, try to avoid temperature extremes.    Patients frequently experience itching after their wound appears to have healed because of the continue healing under the skin.  Plain Vaseline will help relieve the itching.        POSSIBLE COMPLICATIONS    BLEEDIN. Leave the bandage in place.  2. Use tightly rolled up gauze or a cloth to apply direct pressure over the bandage for 30  minutes.  3. Reapply pressure for an additional 30 minutes if necessary  4. Use additional gauze and tape to maintain pressure once the bleeding has stopped.        Proper skin care from [facility]:    -Eliminate harsh soaps as they strip the natural oils from the skin, often resulting in dry itchy skin ( i.e. Dial, Zest, Latonya Spring)  -Use mild soaps such as Cetaphil or Dove Sensitive Skin in the shower. You do not need to use soap on arms, legs, and trunk every time you shower unless visibly soiled.   -Avoid hot or cold showers.  -After showering, lightly dry off and apply moisturizing within 2-3 minutes. This will help trap moisture in the skin.   -Aggressive use of a moisturizer at least 1-2 times a day to the entire body (including -Vanicream, Cetaphil, Aquaphor or Cerave) and moisturize hands after every washing.  -We recommend using moisturizers that come in a tub that needs to be scooped out, not a pump. This has more of an oil base. It will hold moisture in your skin much better than a water base moisturizer. The above recommended are non-pore clogging.         Wear a sunscreen with at least SPF 30 on your face,  ears, neck and V of the chest daily. Wear sunscreen on other areas of the body if those areas are exposed to the sun throughout the day. Sunscreens can contain physical and/or chemical blockers. Physical blockers are less likely to clog pores, these include zinc oxide and titanium dioxide. Reapply every two hour and after swimming. Sunscreen examples include Neutrogena, CeraVe, Blue Lizard, Elta MD and many others.    UV radiation  UVA radiation remains constant throughout the day and throughout the year. It is a longer wavelength than UVB and therefore penetrates deeper into the skin leading to immediate and delayed tanning, photoaging, and skin cancer. 70-80% of UVA and UVB radiation occurs between the hours of 10am-2pm.  UVB radiation  UVB radiation causes the most harmful effects and is more significant during the summer months. However, snow and ice can reflect UVB radiation leading to skin damage during the winter months as well. UVB radiation is responsible for tanning, burning, inflammation, delayed erythema (pinkness), pigmentation (brown spots), and skin cancer.   Just because you do not burn or are not developing a tan does not mean that you are not damaging your skin. A 15 minute drive to and from work for 30 years an lead to chronic sun damage of the skin. It is important to wear a broad spectrum (both UVA and UVB) sunscreen EVERY day with at least 30 SPF. Apply to face, ears, neck and v of the chest as this is where most of our sun exposure is. Reapply sunscreen every two hours if you plan on being outside.   Reynaldo Dia. Clinical Dermatology: A Color Guide to Diagnosis and Therapy. Elsevier, 2016.

## 2019-02-06 NOTE — LETTER
2/6/2019         RE: Alexi Montemayor  [id number] [facility] [id number]        Dear Colleague,    Thank you for referring your patient, Alexi Montemayor, to the [facility]. Please see a copy of my visit note below.    HPI:  Alexi Montemayor is a 33 year old female patient here today for spot on neck .  Patient states this has been present for  years.  Patient reports the following symptoms: changing size, color, and texture .  Patient reports the following previous treatments: none.  Patient reports the following modifying factors: none.  Associated symptoms: none.  Patient has no other skin complaints today.  Remainder of the HPI, Meds, PMH, Allergies, FH, and SH was reviewed in chart.    Pertinent Hx:   No personal or family history of skin cancer    Past Medical History:   Diagnosis Date     Menarche 13 years old       History reviewed. No pertinent surgical history.     Family History   Problem Relation Age of Onset     Glaucoma No family hx of      Macular Degeneration No family hx of        Social History     Socioeconomic History     Marital status:      Spouse name: Not on file     Number of children: Not on file     Years of education: Not on file     Highest education level: Not on file   Social Needs     Financial resource strain: Not on file     Food insecurity - worry: Not on file     Food insecurity - inability: Not on file     Transportation needs - medical: Not on file     Transportation needs - non-medical: Not on file   Occupational History     Not on file   Tobacco Use     Smoking status: Never Smoker     Smokeless tobacco: Never Used   Substance and Sexual Activity     Alcohol use: Yes     Comment: Rarely     Drug use: No     Sexual activity: Yes     Partners: Male     Birth control/protection: Condom   Other Topics Concern     Parent/sibling w/ CABG, MI or angioplasty before 65F 55M? No   Social History Narrative     Not on file       Outpatient Encounter  Medications as of 2019   Medication Sig Dispense Refill     [] mupirocin (BACTROBAN) 2 % ointment Apply topically 3 times daily for 5 days 30 g 1     No facility-administered encounter medications on file as of 2019.        Review Of Systems:  Skin: As above  Eyes: negative  Ears/Nose/Throat: negative  Respiratory: No shortness of breath, dyspnea on exertion, cough, or hemoptysis  Cardiovascular: negative  Gastrointestinal: negative  Genitourinary: negative  Musculoskeletal: negative  Neurologic: negative  Psychiatric: negative  Hematologic/Lymphatic/Immunologic: negative  Endocrine: negative      Objective:     /87   Pulse 118   SpO2 99%   Eyes: Conjunctivae/lids: Normal   ENT: Lips:  Normal  MSK: Normal  Cardiovascular: Peripheral edema none  Pulm: Breathing Normal  Neuro/Psych: Orientation: Normal; Mood/Affect: Normal, NAD, WDWN  Pt accompanied by: self  Following areas examined: Scalp, face, eyelids, lips, neck, chest, abdomen, back, buttocks, and R&L upper and lower extremities.  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  No complications and routine wound care.  May grow back and will get a scar. Based on lesion type may need to completely remove lesion. Patient will be notified in 7-10 days of results. Wound care directions given.    2) multiple benign nevi  I discussed the specifics of tumor, prognosis, and genetics of benign lesions.  I explained that treatment of these lesions would be purely cosmetic and not medically neccessary.  I discussed with patient different removal options including  excision, cryotherapy, cautery and /or laser.  Lesion may recur and/or may not completely resolve. May need additional treatment.       Signs and Symptoms of non-melanoma skin cancer and ABCDEs of melanoma reviewed with patient. Patient encouraged to perform monthly self skin exams and educated on how to perform them. UV precautions reviewed with patient. Patient was asked about new or changing moles/lesions on body.   Wear a sunscreen with at least SPF 30 on your face, ears, neck and V of the chest daily. Wear sunscreen on other areas of the body if those areas are exposed to the sun throughout the day. Sunscreens can contain physical and/or chemical blockers. Physical blockers are less likely to clog pores, these include zinc oxide and titanium dioxide. Reapply every two hour and after swimming. Sunscreen examples include Neutrogena, CeraVe, Blue Lizard, Elta MD and many others.    Proper skin care from [facility]:    -Eliminate harsh soaps as they strip the natural oils from the skin, often resulting in dry itchy skin ( i.e. Dial, Zest, [place] Spring)  -Use mild soaps such as Cetaphil or Dove Sensitive Skin in the shower. You do not need to use soap on arms, legs, and trunk every time you shower unless visibly soiled.   -Avoid hot or cold showers.  -After showering, lightly dry off and apply moisturizing within 2-3 minutes. This will help trap moisture in the skin.   -Aggressive use of a moisturizer at least 1-2 times a day to the entire body (including -Vanicream, Cetaphil, Aquaphor or Cerave) and moisturize hands after every washing.  -We recommend using moisturizers that come in a tub that needs to be scooped out, not a pump. This has more of an oil base. It will hold moisture in your skin much better than a water base moisturizer. The above recommended are non-pore clogging.               Follow up in yearly FBE      Again, thank you for allowing me to participate in the care of your patient.         Sincerely,        Trinidad Kerr PA-C

## 2019-02-06 NOTE — PROGRESS NOTES
HPI:  Alexi Montemayor is a 33 year old female patient here today for spot on neck .  Patient states this has been present for  years.  Patient reports the following symptoms: changing size, color, and texture .  Patient reports the following previous treatments: none.  Patient reports the following modifying factors: none.  Associated symptoms: none.  Patient has no other skin complaints today.  Remainder of the HPI, Meds, PMH, Allergies, FH, and SH was reviewed in chart.    Pertinent Hx:   No personal or family history of skin cancer    Past Medical History:   Diagnosis Date     Menarche 13 years old       History reviewed. No pertinent surgical history.     Family History   Problem Relation Age of Onset     Glaucoma No family hx of      Macular Degeneration No family hx of        Social History     Socioeconomic History     Marital status:      Spouse name: Not on file     Number of children: Not on file     Years of education: Not on file     Highest education level: Not on file   Social Needs     Financial resource strain: Not on file     Food insecurity - worry: Not on file     Food insecurity - inability: Not on file     Transportation needs - medical: Not on file     Transportation needs - non-medical: Not on file   Occupational History     Not on file   Tobacco Use     Smoking status: Never Smoker     Smokeless tobacco: Never Used   Substance and Sexual Activity     Alcohol use: Yes     Comment: Rarely     Drug use: No     Sexual activity: Yes     Partners: Male     Birth control/protection: Condom   Other Topics Concern     Parent/sibling w/ CABG, MI or angioplasty before 65F 55M? No   Social History Narrative     Not on file       Outpatient Encounter Medications as of 2019   Medication Sig Dispense Refill     [] mupirocin (BACTROBAN) 2 % ointment Apply topically 3 times daily for 5 days 30 g 1     No facility-administered encounter medications on file as of 2019.        Review Of  Systems:  Skin: As above  Eyes: negative  Ears/Nose/Throat: negative  Respiratory: No shortness of breath, dyspnea on exertion, cough, or hemoptysis  Cardiovascular: negative  Gastrointestinal: negative  Genitourinary: negative  Musculoskeletal: negative  Neurologic: negative  Psychiatric: negative  Hematologic/Lymphatic/Immunologic: negative  Endocrine: negative      Objective:     /87   Pulse 118   SpO2 99%   Eyes: Conjunctivae/lids: Normal   ENT: Lips:  Normal  MSK: Normal  Cardiovascular: Peripheral edema none  Pulm: Breathing Normal  Neuro/Psych: Orientation: Normal; Mood/Affect: Normal, NAD, WDWN  Pt accompanied by: self  Following areas examined: Scalp, face, eyelids, lips, neck, chest, abdomen, back, buttocks, and R&L upper and lower extremities.  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  No complications and routine wound care.  May grow back and will get a scar. Based on lesion type may need to completely remove lesion. Patient will be notified in 7-10 days of results. Wound care directions given.    2) multiple benign nevi  I discussed the specifics of tumor, prognosis, and genetics of benign lesions.  I explained that treatment of these lesions would be purely cosmetic and not medically neccessary.  I discussed with patient different removal options including excision, cryotherapy, cautery and /or laser.  Lesion may recur and/or may not completely resolve. May need additional treatment.       Signs and Symptoms of non-melanoma skin cancer and ABCDEs of melanoma reviewed with patient. Patient encouraged to  perform monthly self skin exams and educated on how to perform them. UV precautions reviewed with patient. Patient was asked about new or changing moles/lesions on body.   Wear a sunscreen with at least SPF 30 on your face, ears, neck and V of the chest daily. Wear sunscreen on other areas of the body if those areas are exposed to the sun throughout the day. Sunscreens can contain physical and/or chemical blockers. Physical blockers are less likely to clog pores, these include zinc oxide and titanium dioxide. Reapply every two hour and after swimming. Sunscreen examples include Neutrogena, CeraVe, Blue Lizard, Elta MD and many others.    Proper skin care from [facility]:    -Eliminate harsh soaps as they strip the natural oils from the skin, often resulting in dry itchy skin ( i.e. Dial, Zest, [place] Spring)  -Use mild soaps such as Cetaphil or Dove Sensitive Skin in the shower. You do not need to use soap on arms, legs, and trunk every time you shower unless visibly soiled.   -Avoid hot or cold showers.  -After showering, lightly dry off and apply moisturizing within 2-3 minutes. This will help trap moisture in the skin.   -Aggressive use of a moisturizer at least 1-2 times a day to the entire body (including -Vanicream, Cetaphil, Aquaphor or Cerave) and moisturize hands after every washing.  -We recommend using moisturizers that come in a tub that needs to be scooped out, not a pump. This has more of an oil base. It will hold moisture in your skin much better than a water base moisturizer. The above recommended are non-pore clogging.    Patient to follow up with Primary Care provider regarding elevated blood pressure.                 Follow up in yearly FBE

## 2019-02-07 ENCOUNTER — HOSPITAL ENCOUNTER (OUTPATIENT)
Dept: BEHAVIORAL HEALTH | Facility: CLINIC | Age: 34
End: 2019-02-07
Attending: PSYCHIATRY & NEUROLOGY
Payer: COMMERCIAL

## 2019-02-07 ENCOUNTER — HOSPITAL ENCOUNTER (OUTPATIENT)
Dept: BEHAVIORAL HEALTH | Facility: CLINIC | Age: 34
Discharge: HOME | End: 2019-02-07
Attending: PSYCHIATRY & NEUROLOGY | Admitting: PSYCHIATRY & NEUROLOGY
Payer: COMMERCIAL

## 2019-02-07 ENCOUNTER — TELEPHONE (OUTPATIENT)
Dept: FAMILY MEDICINE | Facility: CLINIC | Age: 34
End: 2019-02-07

## 2019-02-07 DIAGNOSIS — F41.9 ANXIETY: Primary | ICD-10-CM

## 2019-02-07 PROCEDURE — 90791 PSYCH DIAGNOSTIC EVALUATION: CPT | Performed by: SOCIAL WORKER

## 2019-02-07 ASSESSMENT — PAIN SCALES - GENERAL: PAINLEVEL: MILD PAIN (2)

## 2019-02-07 NOTE — PROGRESS NOTES
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 experience the following consequences of their symptoms: a higher level of care would be needed.    Referrals to services required by statute or rule:   Report to child/adult protection services was NA.   Referral to another professional/service is not indicated at this time..    Program Recommendation: [facility] () .  Pt meets criteria for the [facility] program and will be starting the Program on Monday, February 11, 2019. 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 She would benefit from education about coping skills for symptom management, sensory regulation tools including additional mindfulness strategies, ways to self care, and communication tools to be able to assert her needs and feelings. Rachid is currently not on medications so Dr. Iqbal can recommend if needed. She does not have an outpatient psychiatrist and will need to her HUYEN from work completed by Dr. Iqbal if possible.      Assessment Completed by: [facility]OHNNIE Rivero, [facility]    "

## 2019-02-07 NOTE — TELEPHONE ENCOUNTER
Reason for Call:  Other call back    Detailed comments: pt wants a call back on her biopsy done 2/6/19, pt is nervous and wants a call back from a derm nurse. Thanks.    Phone Number Patient can be reached at: Home number on file [phone] (home)    Best Time: anytime    Can we leave a detailed message on this number? YES    Call taken on 2/7/2019 at 1:31 PM by HUMBERTO HARRY

## 2019-02-07 NOTE — PROGRESS NOTES
"I met with pt briefly to establish need for DA.  She has had anxiety sxs meeting criteria for AYDIN for several years, with sxs exacerbated by work stress.  In the last few months the anxiety worsened and she began having depressive sxs, which for the past month have included passive SI at times.  She says \"this freaked me out and I decided I really need to do something to address this.\"  She has PA's that include dissociation and dizziness at times.  She asked some good questions about the potential for therapy to help with certain symptoms, including grief/loss issues, anx/dep sxs and dissociation.  She is reluctant to try medication.  She thinks a staged return to work after PHP would be helpful.      Recommended proceeding with DA and Partial Hosp Program, with Dr. Iqbal to evaluate need for meds.    "

## 2019-02-07 NOTE — TELEPHONE ENCOUNTER
Called patient back-  Patient is very worried about the results of the biopsy - this is causing severe anxiety-   has been looking at moles on the Internet she thinks hers looks like a squamous cell skin cancer    1. Patient wants to know how long it takes for a squamous cell cancer to spread through out your body?   2. Does she need a PET scan?   3. In Dina's professional opinion- does the mole look like a squamous or melanoma?  4. Have we ever seen a squamous cell spread?    1. Advised patient that it can take years or not at all for a squamous cell caner to spread (per Dina)  2. No pet scan needed  3. No- dina thought it was possibly an atypical mole or an irritated skin tag: explained what atypical mole means: A nevus with atypical changes has a small potential to evolve into a skin cancer  There are degrees of atypia called mild, moderate and severe . A mild atypical nevus does not need a re-excision but if pigmentation should recur then it should be evaluated. The more severe atypical changes that are present then  more concern there is regarding its potential to evolve into a cancer. Usually recommend re excision for a moderate- severe atypical nevus.   Advised that the procedure to remove this- if needed, would be a slightly larger excision  Advised that an irritated skin tag can look different due to constant irritation and scar tissue  Also advised that Dina would have discussed melanoma or squamous cell if she thought that it was either one of those- we usually give patients a heads up and discuss the next steps if these are looking like that type of mole  4. I have never personally seen a squamous cell spread throughout the body    Advised that the results can take 7-14 business days- and could be longer- this is normal as we only have 2 derm paths for our clinics  patient verbalized understanding- states she feels much better about the diagnosis- advised that we cannot give any diagnosis without a  microscopic examination- but please do not have anxiety over the resutls as Marine is not worried about the results.  patient again verbalized understanding  Advised to call with any further concerns and that I would keep her message in the basket to keep an eye out for th results.    Candi CesarRN BSN  [facility]  [phone]

## 2019-02-08 LAB — COPATH REPORT: NORMAL

## 2019-02-11 ENCOUNTER — TELEPHONE (OUTPATIENT)
Dept: DERMATOLOGY | Facility: CLINIC | Age: 34
End: 2019-02-11

## 2019-02-11 ENCOUNTER — HOSPITAL ENCOUNTER (OUTPATIENT)
Dept: BEHAVIORAL HEALTH | Facility: CLINIC | Age: 34
End: 2019-02-11
Attending: PSYCHIATRY & NEUROLOGY
Payer: COMMERCIAL

## 2019-02-11 DIAGNOSIS — F41.0 PANIC DISORDER WITHOUT AGORAPHOBIA: ICD-10-CM

## 2019-02-11 PROBLEM — F33.1 MAJOR DEPRESSIVE DISORDER, RECURRENT EPISODE, MODERATE WITH ANXIOUS DISTRESS (H): Status: ACTIVE | Noted: 2019-02-11

## 2019-02-11 PROCEDURE — H0035 MH PARTIAL HOSP TX UNDER 24H: HCPCS

## 2019-02-11 ASSESSMENT — ANXIETY QUESTIONNAIRES
2. NOT BEING ABLE TO STOP OR CONTROL WORRYING: SEVERAL DAYS
1. FEELING NERVOUS, ANXIOUS, OR ON EDGE: SEVERAL DAYS
6. BECOMING EASILY ANNOYED OR IRRITABLE: NOT AT ALL
7. FEELING AFRAID AS IF SOMETHING AWFUL MIGHT HAPPEN: SEVERAL DAYS
GAD7 TOTAL SCORE: 5
5. BEING SO RESTLESS THAT IT IS HARD TO SIT STILL: NOT AT ALL
IF YOU CHECKED OFF ANY PROBLEMS ON THIS QUESTIONNAIRE, HOW DIFFICULT HAVE THESE PROBLEMS MADE IT FOR YOU TO DO YOUR WORK, TAKE CARE OF THINGS AT HOME, OR GET ALONG WITH OTHER PEOPLE: SOMEWHAT DIFFICULT
3. WORRYING TOO MUCH ABOUT DIFFERENT THINGS: SEVERAL DAYS

## 2019-02-11 ASSESSMENT — PATIENT HEALTH QUESTIONNAIRE - PHQ9
SUM OF ALL RESPONSES TO PHQ QUESTIONS 1-9: 7
5. POOR APPETITE OR OVEREATING: SEVERAL DAYS

## 2019-02-11 NOTE — TELEPHONE ENCOUNTER
Notes recorded by Trinidad Kerr PA-C on 2/11/2019 at 11:54 AM CST  Skin, posterior inferior neck:   - Intradermal melanocytic nevus (nml healthy mole)    This is a benign lesion that does not need any additional treatment.

## 2019-02-11 NOTE — TELEPHONE ENCOUNTER
Routing over to other Derm nurse who has been covering this patient.    Endy RN-BSN  [facility]  [phone]

## 2019-02-11 NOTE — TELEPHONE ENCOUNTER
Left detailed message on voice mail per patient request on last phone message with results.    Candi CesarRN BSN  [facility]  [phone]

## 2019-02-11 NOTE — TELEPHONE ENCOUNTER
results are finalized- please comment and I will call patient back.    Thank You,    Candi DAVIS RN  [facility]  [phone]  [facility]   [phone]

## 2019-02-11 NOTE — TELEPHONE ENCOUNTER
Patient notified of test results and providers message, - left detailed voice mail    Candi DAVISRN BSN  [facility]  [phone]     68

## 2019-02-11 NOTE — PROGRESS NOTES
Adult Mental Health Outpatient Group Therapy Progress Note     Client Initial Individualized Goals for Treatment: Learn coping skills for symptom management for anxiety and depression stabilization.        See Initial Treatment suggestions for the client during the time between Diagnostic Assessment and completion of the Master Individualized Treatment Plan.    Treatment Goals:         Area of Treatment Focus:  Symptom Management, Personal Safety, Community Resources/Discharge Planning, Develop / Improve Independent Living Skills and Develop Socialization / Interpersonal Relationship Skills    Therapeutic Interventions/Treatment Strategies:  Support, Feedback, Education and Cognitive Behavioral Therapy    Response to Treatment Strategies:  Accepted Feedback, Gave Feedback, Listened and Focused on Goals     Name of Group: Anxiety Lab 10-11, Self-talk 2-3     Number of Group Participants: 6    Description and Outcome:  Discussed physiological aspects of anxiety (impact of adrenaline and role of cortisol) and the implications of this for managing anxiety: practicing intentional physical relaxation, reducing use of avoidance, systematic desensitization, importance of taking regular breaks when dealing with life's demands.    Participated in discussion of cognitive distortions and influence on depression and anxiety. Reviewed some common cognitive distortions, how these can negatively impact mood and stress levels and options for changing thoughts.    Is this a Weekly Review of the Progress on the Treatment Plan?  No

## 2019-02-12 ASSESSMENT — ANXIETY QUESTIONNAIRES: GAD7 TOTAL SCORE: 5

## 2019-02-13 ENCOUNTER — APPOINTMENT (OUTPATIENT)
Dept: LAB | Facility: CLINIC | Age: 34
End: 2019-02-13
Payer: COMMERCIAL

## 2019-02-13 ENCOUNTER — HOSPITAL ENCOUNTER (OUTPATIENT)
Dept: BEHAVIORAL HEALTH | Facility: CLINIC | Age: 34
End: 2019-02-13
Attending: PSYCHIATRY & NEUROLOGY
Payer: COMMERCIAL

## 2019-02-13 PROCEDURE — H0035 MH PARTIAL HOSP TX UNDER 24H: HCPCS

## 2019-02-13 PROCEDURE — 36415 COLL VENOUS BLD VENIPUNCTURE: CPT | Performed by: PSYCHIATRY & NEUROLOGY

## 2019-02-13 PROCEDURE — 82306 VITAMIN D 25 HYDROXY: CPT | Performed by: PSYCHIATRY & NEUROLOGY

## 2019-02-13 NOTE — PROGRESS NOTES
Adult Mental Health Outpatient Group Therapy Progress Note     Client Initial Individualized Goals for Treatment: Learn coping skills for symptom management for anxiety and depression stabilization.           See Initial Treatment suggestions for the client during the time between Diagnostic Assessment and completion of the Master Individualized Treatment Plan.     Treatment Goals:  To be determined   Area of Treatment Focus:  Symptom Management and Develop Socialization / Interpersonal Relationship Skills    Therapeutic Interventions/Treatment Strategies:  Support, Feedback, Structured Activity, Clarification and Education    Response to Treatment Strategies:  Accepted Feedback, Gave Feedback, Listened, Focused on Goals, Attentive, Accepted Support and Alert    Name of Group:  Mental health management 200-250, 4 participants     Description and Outcome:  The group participated in a structured, psychoeducational discussion and activity comparing and contrasting self esteem and self compassion.  Self esteem was presented as a useful, but limiting construct, as if is prone to change with circumstances and relies on comparisons to others.  On the other hand, self compassion can be described as breathing the self kindly, especially during struggle.  Self compassion facilitates connection to others.  Handouts, including exercises to practise self compassion, were given. Alexi identified struggles with self compassion and verbalized understanding of topic.    Is this a Weekly Review of the Progress on the Treatment Plan?  No

## 2019-02-13 NOTE — PROGRESS NOTES
[facility]   Dr. Iqbal's Psychiatric Progress Note  2019      Patient:  Alexi Montemayor   Medical Record Number:  [id number]  :  1985          Interim History:   The patient's care was discussed with the treatment team and chart notes were reviewed.  Missed yesterday due to snow.   She stayed inside and got some housework done and ran a few errands.      Psychiatric ROS:  Mood: anxious  Sleep:  Middle insomnia  Appetite:normal  Eating:normal  Energy Level:  normal  Concentration/Memory Problems:  NO  Suicidal Thoughts:No  Homicidal Thoughts:No  Psychotic Symptoms: No  Medication Side Effects:Not on meds  Medication Compliance:Yes   Physical Complaints:negative         Medications:     No current outpatient medications on file.     No current facility-administered medications for this encounter.              Allergies:     Allergies   Allergen Reactions     Penicillins Hives     Sulfa Drugs Hives            Psychiatric Examination:   There were no vitals taken for this visit.  Weight is 0 lbs 0 oz  There is no height or weight on file to calculate BMI.    Appearance:  awake, alert and adequately groomed  Attitude:  cooperative  Eye Contact:  good  Mood:  anxious  Affect:  brighter  Speech:  clear, coherent  Psychomotor Behavior:  no evidence of tardive dyskinesia, dystonia, or tics Throught Process:  logical  Associations:  no loose associations  Thought Content:  no evidence of suicidal ideation or homicidal ideation and no evidence of psychotic thought  Insight:  good  Judgement:  intact  Oriented to:  time, person, and place  Attention Span and Concentration:  intact  Recent and Remote Memory:  intact  Gait:Normal    Risk/Potential for Dangerousness:  Multiple Active Diagnoses:HIGH  Self Care:MODERATE  Suicide:LOW  Assault:LOW  Self Injurious Behaviors:LOW  Inappropriate Sexual Behavior:LOW         Labs:   No results found for this or any previous visit (from the past  "24 hour(s)).     Recent Results (from the past 1008 hour(s))   Dermatological path order and indications    Collection Time: 02/06/19  3:13 PM   Result Value Ref Range    Copath Report       Patient Name: SUZIE RENDON  MR#: [id number]  Specimen #: [id number]  Collected: 2/6/2019  Received: 2/7/2019  Reported: 2/8/2019 16:42  Ordering Phy(s): JENNIFER HACKETT    For improved result formatting, select 'View Enhanced Report Format' under   Linked Documents section.    SPECIMEN(S):  Skin, posterior inferior neck    FINAL DIAGNOSIS:  Skin, posterior inferior neck:  - Intradermal melanocytic nevus - (see comment)    COMMENT:  The specimen exhibits ulceration and superficial tissue necrosis,   consistent with prior traumatization.    I have personally reviewed all specimens and/or slides, including the   listed special stains, and used them  with my medical judgement to determine or confirm the final diagnosis.    Electronically signed out by:    Rohith Ocasio M.D., [facility]    CLINICAL HISTORY:   The patient is a 33-year-old female.    GROSS:  The specimen is received in formalin with proper patient identification,   labeled \"posterior inferior neck\".  The specimen c onsists of a 0.5 x 0.5 cm irregular tan skin shave.  The   skin surface contains a 0.6 x 0.5 x 0.2  cm tan-pink raised lesion.  The resection margin is inked blue, the   specimen is bisected and submitted in its  entirety in cassette A1. (Dictated by: Baldo Stewart 2/7/2019 11:35 AM)    MICROSCOPIC:  The specimen exhibits an intradermal proliferation of nests and cords of   melanocytes which mature with  descent, without a significant junctional component.  The lesion extends   to the deep margin.    The technical component of this testing was completed at the [facility], with the professional component performed   at the [facility], [address] [place] [address] ([phone])    CPT Codes:  A: 62278-XM1.P, 05808-BR2.T    COLLECTION SITE:  Client: [facility]  Location: [facility] (S)             Impression:   This is a 33 year old female continues PHP for anxiety.   Mood is worse in winter.   Groups are going well.           DIagnoses:     Axis I:  Generalized anxiety disorder.   Panic disorder, depressive disorder, not  otherwise specified.   Axis II:  No diagnosis.   Axis III:  No diagnosis.            Plan:     Continue [facility] Hospital Program.   Check Vitamin D level.    Ms. Montemayor plans to begin EMDR with a therapist in [place].  It is not clear that she will continue individual therapy at [facility] in [place].   Ms. Montemayor declines psychotropic medications at this time.  Will reevaluate throughout the course of her partial hospital stay.   Expect stabilization and completion of Partial Hospital Program.         Wilfredo Iqbal MD

## 2019-02-13 NOTE — PROGRESS NOTES
Adult Mental Health Partial Hospitalization Group Therapy Progress Note       Client Initial Individualized Goals for Treatment: Learn coping skills for symptom management for anxiety and depression stabilization.     Initial Treatment suggestions for the client during the time between Diagnostic Assessment and completion of the Individualized Treatment Plan:  Follow Safety Plan   Ask for more information, support and/or assistance as needed.  [facility]  Report increases or changes in symptoms to staff.  Report any personal safety concerns to staff.   Take medications as prescribed.  Report medication changes and/or side effects to staff.  Attend and participate in groups as scheduled or notify staff if unable to do so.  Report any use of substances to staff as this may impact your symptoms and/or personal safety.  Notify staff if you have any other issues that need to be addressed. This may include any current abuse / neglect / exploitation or other vulnerability.  Follow recommendations of your treatment team and discuss concerns if not in agreement.    Area of Treatment Focus:  Symptom Management and Develop / Improve Independent Living Skills    Therapeutic Interventions/Treatment Strategies:  Support, Feedback, Structured Activity, Problem Solving, Clarification and Education    Response to Treatment Strategies:  Accepted Feedback, Listened, Focused on Goals, Attentive, Accepted Support and Alert    Name of Group:  OT life skills: Goal integration  Date: 2/11/19  Time: 1:00-1:50  Group Participants: 6    Description and Outcome: Alexi attended and participated in a structured life skills psychoeducation group where intervention focuses on experiential learning, developing through practice, and generalizing taught skills. Through the use of supported social interactions, structured therapeutic and functional tasks in context,  group members work towards stabilizing and managing mental health symptoms for improved participation and function in valued roles, routines, relationships, and independent living.     To support progress towards treatment goals and psychiatric recovery, group members were led through a structured process to integrate new learning, skills, and emerging self-awareness into their daily and weekly life. The process includes:   1. Brief psychoeducation on evidence around the neuro-science of change with regards to setting small achievable goals.  2. Brief education on components of self-compassion in context of setting goals provided.  3. Brief psychoeducation and practice of 2 positive psychology skills: GLAD and 3 good things.   4. Write smart goals for the next week in 3 of the following areas: self-compassion, mindfulness, connections, wellness, lifestyle balance, discharge planning, and coping skills.   5. Integrate the goals into their life using a provided weekly planner. Encouraged to highlight the highlights as the week progresses.  6. Sharing intentions with the group for accountability, and to also receive support from peers as the week progresses  Alexi presents with calm even affect today. She focused on session content with good effort. She was able to set the following 3 SMART goals for the week in support of her psychiatric recovery:   1) Self compassion: Identify negative self talk and focus on challenging it 2x.  2) Lifestyle balance: clean kitchen for 1/2 hour each night.   3) Wellness: Stick to night time routine/sleep schedule     Validation and support provided. Alexi would benefit from additional opportunities to practice the content to be able to generalize it to her everyday life with increased intentionality, consistency, and efficacy in support of her psychiatric recovery.     Progress towards ITP goals: Alexi worked towards her initial treatment goals today. Will continue to monitor and  assess and develop life skills treatment goals with her in subsequent sessions.    Is this a Weekly Review of the Progress on the Treatment Plan?  Yes.      Are Treatment Plan Goals being addressed?  Yes, continue treatment goals      Are Treatment Plan Strategies to Address Goals Effective?  Yes, continue treatment strategies      Are there any current contracts in place?  No

## 2019-02-13 NOTE — PROGRESS NOTES
Adult Mental Health Outpatient Group Therapy Progress Note     Client Initial Individualized Goals for Treatment: Learn coping skills for symptom management for anxiety and depression stabilization.           See Initial Treatment suggestions for the client during the time between Diagnostic Assessment and completion of the Master Individualized Treatment Plan.     Treatment Goals:  To be determined   Area of Treatment Focus:  Symptom Management and Develop Socialization / Interpersonal Relationship Skills    Therapeutic Interventions/Treatment Strategies:  Support, Feedback, Structured Activity, Clarification and Education    Response to Treatment Strategies:  Accepted Feedback, Gave Feedback, Listened, Focused on Goals, Attentive, Accepted Support and Alert    Name of Group:  Group psychotherapy 9-10, Emotions Management 10-11 4 participants     Description and Outcome:  Alexi arrived a bit late, so missed daily mindfulness practice. Says she decided to stay home from program yesterday ad she feared the roads were too bad. Says she did get a few things done at home and felt good about that. Talked some about her stress at work and her worry about changing jobs, though she does think she needs to do something different. Talked about making choices based on what is valuable to her rather than based on fear.     Participated in discussion of emotional regulation skills and utilizing these to create a lifestyle that provides for more emotional resilience and how these can be used when dealing with anxiety and depression.    Is this a Weekly Review of the Progress on the Treatment Plan?  No

## 2019-02-14 ENCOUNTER — HOSPITAL ENCOUNTER (OUTPATIENT)
Dept: BEHAVIORAL HEALTH | Facility: CLINIC | Age: 34
End: 2019-02-14
Attending: PSYCHIATRY & NEUROLOGY
Payer: COMMERCIAL

## 2019-02-14 LAB
DEPRECATED CALCIDIOL+CALCIFEROL SERPL-MC: <28 UG/L (ref 20–75)
VITAMIN D2 SERPL-MCNC: <5 UG/L
VITAMIN D3 SERPL-MCNC: 23 UG/L

## 2019-02-14 PROCEDURE — H0035 MH PARTIAL HOSP TX UNDER 24H: HCPCS

## 2019-02-14 NOTE — PROGRESS NOTES
Adult Mental Health Partial Hospitalization Group Therapy Progress Note       Client Initial Individualized Goals for Treatment: Learn coping skills for symptom management for anxiety and depression stabilization.     Initial Treatment suggestions for the client during the time between Diagnostic Assessment and completion of the Individualized Treatment Plan:  Follow Safety Plan   Ask for more information, support and/or assistance as needed.  [facility]  Report increases or changes in symptoms to staff.  Report any personal safety concerns to staff.   Take medications as prescribed.  Report medication changes and/or side effects to staff.  Attend and participate in groups as scheduled or notify staff if unable to do so.  Report any use of substances to staff as this may impact your symptoms and/or personal safety.  Notify staff if you have any other issues that need to be addressed. This may include any current abuse / neglect / exploitation or other vulnerability.  Follow recommendations of your treatment team and discuss concerns if not in agreement.    Area of Treatment Focus:  Symptom Management and Develop / Improve Independent Living Skills    Therapeutic Interventions/Treatment Strategies:  Support, Feedback, Safety Assessment, Structured Activity, Problem Solving and Education    Response to Treatment Strategies:  Accepted Feedback, Gave Feedback, Listened, Focused on Goals, Attentive, Accepted Support and Alert    Name of Group:  Group therapy Date/Time: 02/14/2019 [id number], Group Participants: 3  Name of Group: Interpersonal Effectiveness Date/Time: 02/14/2019 / 1000 to 1050; Group Participants: 4    Description and Outcome:  Group therapy  Client reported she had a good evening with her family.  She said she woke up feeling more anxious.  She said she notices more anxiety a week before commencing menstruation.  She  said she thinks that is a pattern for her.  She said she has never discussed this with any medical professional.  Writer discussed the possibility that hormonal changes can affect mood and anxiety.  She agreed to talk about with the MD tomorrow.  No SI reported.  Client verbalized understanding of session content by problem solving with group members and accepting feedback.  Interpersonal Communication  Client participated in a session on communication strategies.  Information on biology, environment, and previous experience affecting arousal was presented.  Those factors were  linked to communication and skills that were presented with the goal to help decrease arousal prior to communication.  Benefits of effective communication were linked to goals in interactions and self-efficacy resulting from improved communication.  .Components of whole communication were presented and discussed among group members.  Examples were generated and problem solving barriers to effective communication were discussed among group members. She discussed her tendency to lead conversations with her emotions and can see how that would affect the type of interaction that facilitates.  Client demonstrated understanding by participating in problem solving and working on examples with other group members.    Is this a Weekly Review of the Progress on the Treatment Plan?  No

## 2019-02-14 NOTE — PROGRESS NOTES
Adult Mental Health Outpatient Group Therapy Progress Note     Client Initial Individualized Goals for Treatment: Learn coping skills for symptom management for anxiety and depression stabilization.           See Initial Treatment suggestions for the client during the time between Diagnostic Assessment and completion of the Master Individualized Treatment Plan.     Treatment Goals:  To be determined      Area of Treatment Focus:  Symptom Management and Develop Socialization / Interpersonal Relationship Skills    Therapeutic Interventions/Treatment Strategies:  Support, Feedback, Structured Activity, Clarification and Education    Response to Treatment Strategies:  Accepted Feedback, Gave Feedback, Listened, Focused on Goals, Attentive, Accepted Support and Alert    Name of Group:  Mental Health Management, [id number], Self Awareness [id number], 4 participants     Description and Outcome:  Mental Health Management The group was given a structured journaling worksheet with the focus on feeling identification, expression and containment.  Information on the purpose and benefits of structured journaling was discussed.  Group members were offered the opportunity to share part, all, or none of their journaling and were encouraged to comment on process. Bhavna shared that she felt more at peace at end of exercise.  She chose to work with feeling of anxiety and demonstrated understanding of topic by exploring ways of changing relationship to anxiety.    Self Awareness:  The group was provided with a guided breathing and warmup structure with focus on increasing self awareness and on providing an embodied experience.  Discussion included the importance of listening to body cues as a way of identifying emotion as well as accompanying needs and wants, and as a way of practising self compassion, authenticity, mindfulness, self expression,  and connection to other.  Alexi demonstrated understanding of session by identifying  movement preferences (float).    Is this a Weekly Review of the Progress on the Treatment Plan?  No

## 2019-02-15 ENCOUNTER — HOSPITAL ENCOUNTER (OUTPATIENT)
Dept: BEHAVIORAL HEALTH | Facility: CLINIC | Age: 34
End: 2019-02-15
Attending: PSYCHIATRY & NEUROLOGY
Payer: COMMERCIAL

## 2019-02-15 PROCEDURE — H0035 MH PARTIAL HOSP TX UNDER 24H: HCPCS

## 2019-02-15 NOTE — PROGRESS NOTES
"  Adult Mental Health Outpatient Group Therapy Progress Note     Client Initial Individualized Goals for Treatment: Learn coping skills for symptom management for anxiety and depression stabilization.           See Initial Treatment suggestions for the client during the time between Diagnostic Assessment and completion of the Master Individualized Treatment Plan.     Treatment Goals:  To be determined      Area of Treatment Focus:  Symptom Management and Develop Socialization / Interpersonal Relationship Skills    Therapeutic Interventions/Treatment Strategies:  Support, Feedback, Structured Activity and Clarification    Response to Treatment Strategies:  Accepted Feedback, Gave Feedback, Listened, Focused on Goals, Attentive, Accepted Support and Alert    Name of Group:  Relax and review 200-250, 4 participants     Description and Outcome:  The group was provided with education on intentional physical relaxation and it importance in managing stress. The group was led through progressive muscle relaxation exercise.  The group was then guided through full body stretches which ultimately developed into a movement phrase that \"reviewed\" the week with each member contributing to the development of the phrase.  Client demonstrated understanding of session by practising pmr and sharing a highlight from this week. Alexi shared that she found pmr helpful.    Is this a Weekly Review of the Progress on the Treatment Plan?  No        "

## 2019-02-15 NOTE — PROGRESS NOTES
"  Adult Mental Health Outpatient Group Therapy Progress Note     Client Initial Individualized Goals for Treatment: Learn coping skills for symptom management for anxiety and depression stabilization.           See Initial Treatment suggestions for the client during the time between Diagnostic Assessment and completion of the Master Individualized Treatment Plan.     Treatment Goals:  To be determined      Area of Treatment Focus:  Symptom Management and Develop Socialization / Interpersonal Relationship Skills    Therapeutic Interventions/Treatment Strategies:  Support, Feedback, Structured Activity and Clarification    Response to Treatment Strategies:  Accepted Feedback, Gave Feedback, Listened, Focused on Goals, Attentive, Accepted Support and Alert    Name of Group:  Group psychotherapy 9-10, Network Development 10-11, 4 participants     Description and Outcome:    Alexi participated in daily mindfulness practice. Says she is \"okay\". Says her not was \"not too bad\". Spent some time with her son and , did some coloring for mindfulness. Briefly touched on being distressed around wishing her mom could communicate when she is upset, though her mother is unable to do that due to medical issues. Says she had poor sleep, would wake up worrying. Says she plans to go out to dinner with her family over the weekend, as well as go to son's basketball game and go to the gym.    Discussed value of effective support in managing mental health issues, as well as common behavior of people who have depression to never ask for support. Completed exercise to begin identifying support needs and possible ways to work through barriers to asking for support.    Is this a Weekly Review of the Progress on the Treatment Plan?  No        "

## 2019-02-15 NOTE — PROGRESS NOTES
[facility]   Dr. Iqbal's Psychiatric Progress Note  02/15/2019      Patient:  Alexi Montemayor   Medical Record Number:  [id number]  :  1985          Interim History:   The patient's care was discussed with the treatment team and chart notes were reviewed.  Week is pretty good.    Wed. Felt good.   Yesterday after group felt emotional.  Ruminates.  Lots of thoughts of mom and missing her.  Anxiety started with a lot of somatic complaints around age 26 around the time mom was dx with MS.  Feels overwhelmed at times.   She'll watch son's basketball game today, go to the [employer].   Tomorrow will work out again, clean, and  has dinner plans.            Psychiatric ROS:  Mood: anxious  Sleep:   Poor last night  Appetite:  normal  Eating:  normal  Energy Level:  normal  Concentration/Memory Problems:  NO  Suicidal Thoughts:No  Homicidal Thoughts:No  Psychotic Symptoms: No  Medication Side Effects:  Not on meds  Medication Compliance:Yes   Physical Complaints:negative         Medications:     No current outpatient medications on file.     No current facility-administered medications for this encounter.              Allergies:     Allergies   Allergen Reactions     Penicillins Hives     Sulfa Drugs Hives            Psychiatric Examination:   There were no vitals taken for this visit.  Weight is 0 lbs 0 oz  There is no height or weight on file to calculate BMI.    Appearance:  awake, alert and adequately groomed  Attitude:  cooperative  Eye Contact:  good  Mood:  anxious  Affect:  brighter  Speech:  clear, coherent  Psychomotor Behavior:  no evidence of tardive dyskinesia, dystonia, or tics Throught Process:  logical  Associations:  no loose associations  Thought Content:  no evidence of suicidal ideation or homicidal ideation and no evidence of psychotic thought  Insight:  good  Judgement:  intact  Oriented to:  time, person, and place  Attention Span and Concentration:   intact  Recent and Remote Memory:  intact  Gait:Normal    Risk/Potential for Dangerousness:  Multiple Active Diagnoses:HIGH  Self Care:MODERATE  Suicide:LOW  Assault:LOW  Self Injurious Behaviors:LOW  Inappropriate Sexual Behavior:LOW         Labs:   No results found for this or any previous visit (from the past 24 hour(s)).     Recent Results (from the past 1008 hour(s))   Dermatological path order and indications    Collection Time: 02/06/19  3:13 PM   Result Value Ref Range    Copath Report          25 Hydroxyvitamin D2 and D3    Collection Time: 02/13/19  3:02 PM   Result Value Ref Range    25 OH Vit D2 <5 ug/L    25 OH Vit D3 23 ug/L    25 OH Vit D total <28 20 - 75 ug/L         Impression:   This is a 33 year old female continues PHP for anxiety.   Mood is worse in winter.   Groups are going well.  She wants no meds.           DIagnoses:     Axis I:  Generalized anxiety disorder.   Panic disorder, depressive disorder, not  otherwise specified.   Axis II:  No diagnosis.   Axis III:  No diagnosis.            Plan:     Continue [facility] Program.   Check Vitamin D level.    Take an extra 1000 international unit(s)/d.    Ms. Montemayor plans to begin EMDR with a therapist in [place].  It is not clear that she will continue individual therapy at [facility] in [place].   Ms. Montemayor declines psychotropic medications at this time.  Will reevaluate throughout the course of her partial hospital stay.   Expect stabilization and completion of Partial Hospital Program.         Wilfredo Iqbal MD

## 2019-02-18 ENCOUNTER — HOSPITAL ENCOUNTER (OUTPATIENT)
Dept: BEHAVIORAL HEALTH | Facility: CLINIC | Age: 34
End: 2019-02-18
Attending: PSYCHIATRY & NEUROLOGY
Payer: COMMERCIAL

## 2019-02-18 PROCEDURE — H0035 MH PARTIAL HOSP TX UNDER 24H: HCPCS

## 2019-02-18 NOTE — PROGRESS NOTES
Adult Mental Health Partial Hospitalization Group Therapy Progress Note       Client Initial Individualized Goals for Treatment: Learn coping skills for symptom management for anxiety and depression stabilization.     Initial Treatment suggestions for the client during the time between Diagnostic Assessment and completion of the Individualized Treatment Plan:  Follow Safety Plan   Ask for more information, support and/or assistance as needed.  [facility]  Report increases or changes in symptoms to staff.  Report any personal safety concerns to staff.   Take medications as prescribed.  Report medication changes and/or side effects to staff.  Attend and participate in groups as scheduled or notify staff if unable to do so.  Report any use of substances to staff as this may impact your symptoms and/or personal safety.  Notify staff if you have any other issues that need to be addressed. This may include any current abuse / neglect / exploitation or other vulnerability.  Follow recommendations of your treatment team and discuss concerns if not in agreement.    Area of Treatment Focus:  Symptom Management and Develop / Improve Independent Living Skills    Therapeutic Interventions/Treatment Strategies:  Support, Feedback, Structured Activity, Problem Solving, Clarification and Education    Response to Treatment Strategies:  Accepted Feedback, Listened, Focused on Goals, Attentive, Accepted Support and Alert    Name of Group:  OT life skills  Date: 2/13/19  Time: 1:00-1:50  Group Participants: 4    Description and Outcome: Alexi attended and participated in a structured life skills psychoeducation group where intervention focuses on experiential learning, developing through practice, and generalizing taught skills. Through the use of supported social interactions, structured therapeutic and functional tasks in context, group members work  towards stabilizing and managing mental health symptoms for improved participation and function in valued roles, routines, relationships, and independent living.       Skilled Intervention: Provided client with opportunity and guidance to use chosen focused tructured activity to gain self awareness around self regulation and self compassion skill building. Support and validation provided.    Presentation and Assessment: Alexi presents with constricted affect but is able to choose and engage in a calming repetitive activity. She works with adequate skill and results but does endorse slowed cognitive and motor processing. She would benefit from additional opportunities to practice the content to be able to generalize it to her everyday life with increased intentionality, consistency, and efficacy in support of her psychiatric recovery.     Progress towards ITP goals: Alexi worked towards her initial treatment goals today. Will continue to monitor and assess and develop life skills treatment goals with her in subsequent sessions.    Is this a Weekly Review of the Progress on the Treatment Plan?  NO.

## 2019-02-18 NOTE — PROGRESS NOTES
Adult Mental Health Partial Hospitalization Group Therapy Progress Note       Client Initial Individualized Goals for Treatment: Learn coping skills for symptom management for anxiety and depression stabilization.     Initial Treatment suggestions for the client during the time between Diagnostic Assessment and completion of the Individualized Treatment Plan:  Follow Safety Plan   Ask for more information, support and/or assistance as needed.  [facility]  Report increases or changes in symptoms to staff.  Report any personal safety concerns to staff.   Take medications as prescribed.  Report medication changes and/or side effects to staff.  Attend and participate in groups as scheduled or notify staff if unable to do so.  Report any use of substances to staff as this may impact your symptoms and/or personal safety.  Notify staff if you have any other issues that need to be addressed. This may include any current abuse / neglect / exploitation or other vulnerability.  Follow recommendations of your treatment team and discuss concerns if not in agreement.    Area of Treatment Focus:  Symptom Management and Develop / Improve Independent Living Skills    Therapeutic Interventions/Treatment Strategies:  Support, Feedback, Safety Assessment, Structured Activity, Problem Solving and Education    Response to Treatment Strategies:  Accepted Feedback, Gave Feedback, Listened, Focused on Goals, Attentive, Accepted Support and Alert    Name of Group:  Group therapy Date/Time: 02/18/2019 [id number], Group Participants: 3  Name of Group: Mental Health Management Date/Time: 02/18/2019 / 1000 to 1050; Group Participants: 5    Description and Outcome:  Group therapy  Client was late to program and did not attend the first group. She called early in the am and reported increase in symptoms believing she would need to be hospitalized as she has not  slept all weekend.  Writer asked her to come in to see the MD and make a decision about what to do next.  She agreed.  She came in and writer discussed sx and options for treating symptoms.  Discussed purpose of inpatient treatment.  Writer encouraged her to consider medication intervention to alleviate sx. She said she is worried about not being able to get off the meds.  Writer suggested that she is not on them yet so she may be worrying about a potential problem instead of the problem in front of her which is not sleeping.  She agreed to talk to the MD.    Mental Health Management  Client participated in an education group on needs, for communicating to her support network.  Client used a structured activity to create a list of symptoms, warning signs, and plans for those who support her daily to help her positively impact her recovery.  Discussed planning for interventions based on symptom distress and creating a plan that helps decrease symptoms to most manageable level.  No SI reported.  Client verbalized understanding of session content by problem solving with group members and accepting feedback.

## 2019-02-18 NOTE — PROGRESS NOTES
[facility]   Dr. Iqbal's Psychiatric Progress Note  2019      Patient:  Alexi Montemayor   Medical Record Number:  [id number]  :  1985          Interim History:   The patient's care was discussed with the treatment team and chart notes were reviewed.  Sleep is bad.  Wakes many times at night.  Sometimes heart is beating fast when wakes.   Hardly slept last night.   Can't shut off brain.  She's anxious.            Psychiatric ROS:  Mood: anxious  Sleep:   Keeps waking during night  Appetite:  normal  Eating:  normal  Energy Level:   tired  Concentration/Memory:  Poor with bad sleep  Suicidal Thoughts: fleeting passive SI without plan or intent  Homicidal Thoughts:No  Psychotic Symptoms: No  Medication Side Effects:  Not on meds  Medication Compliance:Yes   Physical Complaints:negative         Medications:     PAST PSYCH MEDS:  Ativan    Restoril     Take 7.5 to 15mg po at bedtime prn.     No current facility-administered medications for this visit.              Allergies:     Allergies   Allergen Reactions     Penicillins Hives     Sulfa Drugs Hives            Psychiatric Examination:   There were no vitals taken for this visit.  Weight is 0 lbs 0 oz  There is no height or weight on file to calculate BMI.    Appearance:  awake, alert and adequately groomed  Attitude:  cooperative  Eye Contact:  good  Mood:  anxious  Affect:  neutral  Speech:  clear, coherent  Psychomotor Behavior:  no evidence of tardive dyskinesia, dystonia, or tics Throught Process:  logical  Associations:  no loose associations  Thought Content:  no evidence of suicidal ideation or homicidal ideation and no evidence of psychotic thought  Insight:  good  Judgement:  intact  Oriented to:  time, person, and place  Attention Span and Concentration:  intact  Recent and Remote Memory:  intact  Gait:Normal    Risk/Potential for Dangerousness:  Multiple Active Diagnoses:HIGH  Self  Care:MODERATE  Suicide:LOW  Assault:LOW  Self Injurious Behaviors:LOW  Inappropriate Sexual Behavior:LOW         Labs:   No results found for this or any previous visit (from the past 24 hour(s)).     Recent Results (from the past 1008 hour(s))   Dermatological path order and indications    Collection Time: 02/06/19  3:13 PM   Result Value Ref Range    Copath Report          25 Hydroxyvitamin D2 and D3    Collection Time: 02/13/19  3:02 PM   Result Value Ref Range    25 OH Vit D2 <5 ug/L    25 OH Vit D3 23 ug/L    25 OH Vit D total <28 20 - 75 ug/L         Impression:   This is a 33 year old female continues PHP for anxiety.   She's been very anxious and not sleeping.           DIagnoses:     Axis I:  Generalized anxiety disorder.   Panic disorder, depressive disorder, not  otherwise specified.   Axis II:  No diagnosis.   Axis III:  No diagnosis.            Plan:     Continue [facility] Program.   Check Vitamin D level.    Take an extra 1000 international unit(s)/d.    Add Restoril for sleep  Ms. Montemayor plans to begin EMDR with a therapist in [place].  It is not clear that she will continue individual therapy at [facility] in [place].   Ms. Montemayor declines psychotropic medications at this time.  Will reevaluate throughout the course of her partial hospital stay.   Expect stabilization and completion of Partial Hospital Program.         Wilfredo Iqbal MD

## 2019-02-18 NOTE — BRIEF OP NOTE
"Client reporting increased anxiety at night.  She said she is having problems with sleep onset and has been waking frequently with anxiety.  She said she is fearful of her depression and is fearful of suicide.  She said she is very fearful at night and worries her \"drak thoughts\" will come back.  Discussed options and she will talk to Dr. Iqbal about medication.  Discussed purpose of hospitalization and she said she would be willing to try meds first  "

## 2019-02-19 ENCOUNTER — HOSPITAL ENCOUNTER (OUTPATIENT)
Dept: BEHAVIORAL HEALTH | Facility: CLINIC | Age: 34
End: 2019-02-19
Attending: PSYCHIATRY & NEUROLOGY
Payer: COMMERCIAL

## 2019-02-19 PROCEDURE — H0035 MH PARTIAL HOSP TX UNDER 24H: HCPCS

## 2019-02-19 NOTE — PROGRESS NOTES
Adult Mental Health Partial Hospitalization Group Therapy Progress Note       Client Initial Individualized Goals for Treatment: Learn coping skills for symptom management for anxiety and depression stabilization.     Initial Treatment suggestions for the client during the time between Diagnostic Assessment and completion of the Individualized Treatment Plan:  Follow Safety Plan   Ask for more information, support and/or assistance as needed.  [facility]  Report increases or changes in symptoms to staff.  Report any personal safety concerns to staff.   Take medications as prescribed.  Report medication changes and/or side effects to staff.  Attend and participate in groups as scheduled or notify staff if unable to do so.  Report any use of substances to staff as this may impact your symptoms and/or personal safety.  Notify staff if you have any other issues that need to be addressed. This may include any current abuse / neglect / exploitation or other vulnerability.  Follow recommendations of your treatment team and discuss concerns if not in agreement.    Area of Treatment Focus:  Symptom Management and Develop / Improve Independent Living Skills    Therapeutic Interventions/Treatment Strategies:  Support, Feedback, Safety Assessment, Structured Activity, Problem Solving and Education    Response to Treatment Strategies:  Accepted Feedback, Gave Feedback, Listened, Focused on Goals, Attentive, Accepted Support and Alert    Name of Group:  Group therapy, absent; Support Network Education 3-6 Group Participants: Hipolito Truong did not arrive for first or second group of the day, but did come in for last group of the day with her . Participated in viewing film on experience of depression, importance and role of support, importance of effective treatment. Discussed general ways to be supportive of people dealing with depression,  "anxiety, mental health problems. Completed exercise with her  to clarify her support needs. She says she is \"okay\" today, slept better last night, using new sleep aid she was prescribed. Says she found out she got into [facility] anxiety disorders [facility] and will start there on next Monday. Validated this as a good choice.   "

## 2019-02-20 NOTE — PROGRESS NOTES
Adult Mental Health Partial Hospitalization Group Therapy Progress Note       Client Initial Individualized Goals for Treatment: Learn coping skills for symptom management for anxiety and depression stabilization.     Treatment Goals:  1) Safety:     Client will notify staff when needing assistance to develop or implement a coping plan to manage suicidal or self injurious urges.    Client will use coping plan for safety, as needed.  2) Symptom Management: Alexi will:    Process triggers and stressors and identify skills to help handle the anxiety.    3) In OT life skills Alexi will:    Learn, practice, apply 2 skills/strategies that support lifestyle balance/structure/routine with an emphasis on self care time for improved quality of life as she transitions back to work.     Learn, practice, generalize 2 sensory modulation or sensorimotor mindfulness based self regulation skills for improved  distress tolerance.   As measured by self report and observation during groups daily.   4) Discharge Preparation: Alexi will:    Increase effective use of support / increase ability to ask for help by inviting someone to the Support Network Education group to discuss your support needs.     Develop an aftercare / transition plan by discharge date that includes therapy and support services.    Area of Treatment Focus:  Symptom Management and Develop / Improve Independent Living Skills    Therapeutic Interventions/Treatment Strategies:  Support, Feedback, Structured Activity, Problem Solving, Clarification and Education    Response to Treatment Strategies:  Accepted Feedback, Listened, Focused on Goals, Attentive, Accepted Support and Alert    Name of Group:  OT life skills: Goal integration  Date: 2/18/19  Time: 1:00-1:50  Group Participants: 4    Description and Outcome: Alexi attended and participated in a structured life skills psychoeducation group where intervention focuses on experiential learning, developing through  practice, and generalizing taught skills. Through the use of supported social interactions, structured therapeutic and functional tasks in context, group members work towards stabilizing and managing mental health symptoms for improved participation and function in valued roles, routines, relationships, and independent living.     To support progress towards treatment goals and psychiatric recovery, group members were led through a structured process to integrate new learning, skills, and emerging self-awareness into their daily and weekly life. The process includes:   1. Brief psychoeducation on evidence around the neuro-science of change with regards to setting small achievable goals to support symptom management..  2. Brief education on components of self-compassion in context of setting goals provided.  3. Brief psychoeducation and practice of 2 positive thought skills: GLAD, highlight the highlights, and 3 good things.   4. Write smart goals for the next week in 3 of the following areas: self-compassion, mindfulness, connections, wellness, lifestyle balance, discharge planning, and coping skills.   5. Integrate the goals into their life using a provided weekly planner. Encouraged to highlight the highlights as the week progresses.  6. Sharing intentions with the group for accountability, and to also receive support from peers as the week progresses  Alexi presents with anxious affect today. She was able to set the following 3 SMART goals for the week in support of her psychiatric recovery:   1) Wellness: work on sleep hygiene by doing nightly routine including try exercising in the early evening  2) Mindfulness: Try meditation she identified 1x and reflect on.  3) Self compassion: Work on self talk by identifying negative self talk 3x and move from judgmental to more kind.      Validation and support provided. She would benefit from additional opportunities to practice the content to be able to generalize it to  her everyday life with increased intentionality, consistency, and efficacy in support of her psychiatric recovery.     Progress towards ITP goals: Alexi worked towards ITP goal number 3 today.    Is this a Weekly Review of the Progress on the Treatment Plan?  Yes.      Are Treatment Plan Goals being addressed?  Yes, continue treatment goals      Are Treatment Plan Strategies to Address Goals Effective?  Yes, continue treatment strategies      Are there any current contracts in place?  No

## 2019-02-20 NOTE — PROGRESS NOTES
Adult Mental Health Partial Hospitalization Group Therapy Progress Note       Client Initial Individualized Goals for Treatment: Learn coping skills for symptom management for anxiety and depression stabilization.     Treatment Goals:  1) Safety:     Client will notify staff when needing assistance to develop or implement a coping plan to manage suicidal or self injurious urges.    Client will use coping plan for safety, as needed.  2) Symptom Management: Alexi will:    Process triggers and stressors and identify skills to help handle the anxiety.    3) In OT life skills Alexi will:    Learn, practice, apply 2 skills/strategies that support lifestyle balance/structure/routine with an emphasis on self care time for improved quality of life as she transitions back to work.     Learn, practice, generalize 2 sensory modulation or sensorimotor mindfulness based self regulation skills for improved  distress tolerance.   As measured by self report and observation during groups daily.   4) Discharge Preparation: Alexi will:    Increase effective use of support / increase ability to ask for help by inviting someone to the Support Network Education group to discuss your support needs.     Develop an aftercare / transition plan by discharge date that includes therapy and support services.    Area of Treatment Focus:  Symptom Management and Develop / Improve Independent Living Skills    Therapeutic Interventions/Treatment Strategies:  Support, Feedback, Structured Activity, Problem Solving, Clarification and Education    Response to Treatment Strategies:  Accepted Feedback, Listened, Focused on Goals, Attentive, Accepted Support and Alert    Name of Group:  OT life skills  Date: 2/15/19  Time: 1:00-1:50  Group Participants: 4    Description and Outcome: Alexi attended and participated in a structured life skills psychoeducation group where intervention focuses on experiential learning, developing through practice, and  generalizing taught skills. Through the use of supported social interactions, structured therapeutic and functional tasks in context, group members work towards stabilizing and managing mental health symptoms for improved participation and function in valued roles, routines, relationships, and independent living.       Skilled Intervention: Facilitation of experiential engagement in a patient chosen sensory enhanced activity to practice and gain improved self awareness and self efficacy in self regulation through focused activity to help manage mental health symptoms and tolerate distress.     Presentation and Assessment: Alexi presents with calmer affect. She reports she does not quite understand or achieve relief from engaging in her chosen activity and we processed some of her preferences and she agreed to experiment with another modality to see if it helps. She would benefit from additional opportunities to practice the content to be able to generalize it to her everyday life with increased intentionality, consistency, and efficacy in support of her psychiatric recovery.     Progress towards ITP goals: Alexi worked towards ITP goal number 3 today.    Is this a Weekly Review of the Progress on the Treatment Plan?  NO.

## 2019-02-21 ENCOUNTER — TELEPHONE (OUTPATIENT)
Dept: FAMILY MEDICINE | Facility: CLINIC | Age: 34
End: 2019-02-21

## 2019-02-21 ENCOUNTER — OFFICE VISIT (OUTPATIENT)
Dept: INTERNAL MEDICINE | Facility: CLINIC | Age: 34
End: 2019-02-21
Payer: COMMERCIAL

## 2019-02-21 ENCOUNTER — HOSPITAL ENCOUNTER (OUTPATIENT)
Dept: BEHAVIORAL HEALTH | Facility: CLINIC | Age: 34
End: 2019-02-21
Attending: PSYCHIATRY & NEUROLOGY
Payer: COMMERCIAL

## 2019-02-21 ENCOUNTER — TELEPHONE (OUTPATIENT)
Dept: INTERNAL MEDICINE | Facility: CLINIC | Age: 34
End: 2019-02-21

## 2019-02-21 VITALS
HEART RATE: 100 BPM | TEMPERATURE: 98.4 F | SYSTOLIC BLOOD PRESSURE: 112 MMHG | DIASTOLIC BLOOD PRESSURE: 80 MMHG | OXYGEN SATURATION: 99 % | HEIGHT: 63 IN | WEIGHT: 163 LBS | RESPIRATION RATE: 12 BRPM | BODY MASS INDEX: 28.88 KG/M2

## 2019-02-21 DIAGNOSIS — F33.1 MAJOR DEPRESSIVE DISORDER, RECURRENT EPISODE, MODERATE WITH ANXIOUS DISTRESS (H): Primary | ICD-10-CM

## 2019-02-21 DIAGNOSIS — G71.02 FSH (FACIOSCAPULOHUMERAL MUSCULAR DYSTROPHY) (H): ICD-10-CM

## 2019-02-21 PROCEDURE — 82533 TOTAL CORTISOL: CPT | Performed by: NURSE PRACTITIONER

## 2019-02-21 PROCEDURE — 83002 ASSAY OF GONADOTROPIN (LH): CPT | Performed by: NURSE PRACTITIONER

## 2019-02-21 PROCEDURE — H0035 MH PARTIAL HOSP TX UNDER 24H: HCPCS

## 2019-02-21 PROCEDURE — 99213 OFFICE O/P EST LOW 20 MIN: CPT | Performed by: NURSE PRACTITIONER

## 2019-02-21 PROCEDURE — 36415 COLL VENOUS BLD VENIPUNCTURE: CPT | Performed by: NURSE PRACTITIONER

## 2019-02-21 PROCEDURE — 83001 ASSAY OF GONADOTROPIN (FSH): CPT | Performed by: NURSE PRACTITIONER

## 2019-02-21 PROCEDURE — 84144 ASSAY OF PROGESTERONE: CPT | Performed by: NURSE PRACTITIONER

## 2019-02-21 PROCEDURE — 82670 ASSAY OF TOTAL ESTRADIOL: CPT | Performed by: NURSE PRACTITIONER

## 2019-02-21 RX ORDER — SERTRALINE HYDROCHLORIDE 100 MG/1
100 TABLET, FILM COATED ORAL DAILY
Qty: 90 TABLET | Refills: 3 | Status: SHIPPED | OUTPATIENT
Start: 2019-02-21 | End: 2019-02-24

## 2019-02-21 ASSESSMENT — ANXIETY QUESTIONNAIRES
3. WORRYING TOO MUCH ABOUT DIFFERENT THINGS: NEARLY EVERY DAY
5. BEING SO RESTLESS THAT IT IS HARD TO SIT STILL: NOT AT ALL
1. FEELING NERVOUS, ANXIOUS, OR ON EDGE: MORE THAN HALF THE DAYS
6. BECOMING EASILY ANNOYED OR IRRITABLE: NOT AT ALL
IF YOU CHECKED OFF ANY PROBLEMS ON THIS QUESTIONNAIRE, HOW DIFFICULT HAVE THESE PROBLEMS MADE IT FOR YOU TO DO YOUR WORK, TAKE CARE OF THINGS AT HOME, OR GET ALONG WITH OTHER PEOPLE: SOMEWHAT DIFFICULT
2. NOT BEING ABLE TO STOP OR CONTROL WORRYING: NEARLY EVERY DAY
GAD7 TOTAL SCORE: 13
7. FEELING AFRAID AS IF SOMETHING AWFUL MIGHT HAPPEN: MORE THAN HALF THE DAYS

## 2019-02-21 ASSESSMENT — PATIENT HEALTH QUESTIONNAIRE - PHQ9
5. POOR APPETITE OR OVEREATING: NEARLY EVERY DAY
SUM OF ALL RESPONSES TO PHQ QUESTIONS 1-9: 13

## 2019-02-21 ASSESSMENT — MIFFLIN-ST. JEOR: SCORE: 1413.49

## 2019-02-21 NOTE — LETTER
February 25, 2019      Alexi Montemayor  [address] [address] [facility] [address]        Dear ,    We are writing to inform you of your test results.    Your recent labs were within normal range.    Resulted Orders   Estradiol   Result Value Ref Range    Estradiol 30 pg/mL      Comment:      Estradiol reference ranges for pre-menopausal  Follicular     pg/mL  Mid-cycle    pg/mL  Luteal          pg/mL     Progesterone   Result Value Ref Range    Progesterone 0.6 ng/mL      Comment:      Progesterone Reference Range  Female  Nonpregnant          Follicular      0.15-1.4          Luteal          3.4-25.6          Postmenopausal  <0.15-0.73  Pregnant          1st Trimester   11.2-90.0          2nd Trimester   25.6-89.4          3rd Trimester   48.4-422.5     Lutropin   Result Value Ref Range    Lutropin 6.8 IU/L      Comment:      LH Reference Range  Female: Follicular      1.9-12.5          Mid-cycle       8.7-76.3          Luteal          0.5-16.9          Postmenopausal  15.9-54.0     Follicle stimulating hormone   Result Value Ref Range    FSH 6.8 IU/L      Comment:      FSH Reference Range  Female: Follicular      2.5-10.2          Mid-cycle       3.4-33.4          Luteal          1.5-9.1          Postmenopausal  23.0-116.3     Cortisol   Result Value Ref Range    Cortisol Serum 5.6 4 - 22 ug/dL      Comment:      8 AM Cortisol Reference Range = 4-22 ug/dL  4 PM Cortisol Reference Range = 3-17 ug/dL         If you have any questions or concerns, please call the clinic at the number listed above.       Sincerely,        Jaqueline Godoy NP

## 2019-02-21 NOTE — TELEPHONE ENCOUNTER
Reason for call:  Other   Patient called regarding (reason for call): pt has ?'s about her zoloft 100mg dose. She thinks the dose seems high to start out. Would like to discuss.  Additional comments: none    Phone number to reach patient:  Cell number on file:    Telephone Information:   Mobile [phone]       Best Time:anytime  Can we leave a detailed message on this number?  YES

## 2019-02-21 NOTE — PROGRESS NOTES
SUBJECTIVE:   Alexi Montemayor is a 33 year old female who presents to clinic today for the following health issues:    Anxiety/Depression. Pt sees a Therapist and was advised to see PCP due to sx being worse around the time of her period.       Abnormal Mood Symptoms      Duration: months    Description:  Depression: YES-   Anxiety: YES  Panic attacks: no     Accompanying signs and symptoms: see PHQ-9 and AYDIN scores    History (similar episodes/previous evaluation): currently in out pt program [facility], will be starting [facility] next week with therapy and psychiatrists.    Precipitating or alleviating factors: worse befor periods    Therapies tried and outcome: has Rx for a benzo, but has not filled.  Requests labs to check hormone levels and cortisol.    States she has vague thoughts about not being alive, but denies any plans          Problem list and histories reviewed & adjusted, as indicated.  Additional history: as documented    Patient Active Problem List   Diagnosis     Anxiety     Neck pain     Myofascial muscle pain     Major depressive disorder, recurrent episode, moderate with anxious distress (H)     Panic disorder without agoraphobia     FSH (facioscapulohumeral muscular dystrophy)     No past surgical history on file.    Social History     Tobacco Use     Smoking status: Never Smoker     Smokeless tobacco: Never Used   Substance Use Topics     Alcohol use: Yes     Comment: Rarely     Family History   Problem Relation Age of Onset     Glaucoma No family hx of      Macular Degeneration No family hx of          Current Outpatient Medications   Medication Sig Dispense Refill     sertraline (ZOLOFT) 100 MG tablet Take 1 tablet (100 mg) by mouth daily 90 tablet 3     BP Readings from Last 3 Encounters:   02/21/19 112/80   02/06/19 139/87   11/12/18 122/70    Wt Readings from Last 3 Encounters:   02/21/19 73.9 kg (163 lb)   11/12/18 75.3 kg (166 lb)   10/12/18 72.6 kg (160 lb)                 "    Reviewed and updated as needed this visit by clinical staff  Tobacco  Soc Hx      Reviewed and updated as needed this visit by Provider         ROS:  Constitutional, HEENT, cardiovascular, pulmonary, gi and gu systems are negative, except as otherwise noted.    OBJECTIVE:     /80   Pulse 100   Temp 98.4  F (36.9  C) (Oral)   Resp 12   Ht 1.6 m (5' 3\")   Wt 73.9 kg (163 lb)   LMP 02/17/2019   SpO2 99%   BMI 28.87 kg/m    Body mass index is 28.87 kg/m .  GENERAL: healthy, alert and no distress  PSYCH: mentation appears normal, affect flat and anxious        ASSESSMENT/PLAN:               ICD-10-CM    1. Major depressive disorder, recurrent episode, moderate with anxious distress (H) F33.1 sertraline (ZOLOFT) 100 MG tablet     Estradiol     Progesterone     Lutropin     Follicle stimulating hormone     Cortisol   2. FSH (facioscapulohumeral muscular dystrophy) G71.02        F/u meds in 2-4 weeks    Jaqueline Godoy NP  [facility]    "

## 2019-02-22 ENCOUNTER — TELEPHONE (OUTPATIENT)
Dept: BEHAVIORAL HEALTH | Facility: CLINIC | Age: 34
End: 2019-02-22

## 2019-02-22 ENCOUNTER — HOSPITAL ENCOUNTER (EMERGENCY)
Facility: CLINIC | Age: 34
Discharge: HOME | End: 2019-02-22
Attending: EMERGENCY MEDICINE | Admitting: EMERGENCY MEDICINE
Payer: COMMERCIAL

## 2019-02-22 VITALS
BODY MASS INDEX: 28.88 KG/M2 | WEIGHT: 163 LBS | TEMPERATURE: 98.1 F | HEIGHT: 63 IN | RESPIRATION RATE: 18 BRPM | OXYGEN SATURATION: 100 % | HEART RATE: 123 BPM | SYSTOLIC BLOOD PRESSURE: 152 MMHG | DIASTOLIC BLOOD PRESSURE: 100 MMHG

## 2019-02-22 DIAGNOSIS — F41.9 ANXIETY AND DEPRESSION: ICD-10-CM

## 2019-02-22 DIAGNOSIS — F32.A ANXIETY AND DEPRESSION: ICD-10-CM

## 2019-02-22 DIAGNOSIS — G47.00 INSOMNIA, UNSPECIFIED TYPE: ICD-10-CM

## 2019-02-22 LAB
CORTIS SERPL-MCNC: 5.6 UG/DL (ref 4–22)
ESTRADIOL SERPL-MCNC: 30 PG/ML
FSH SERPL-ACNC: 6.8 IU/L
LH SERPL-ACNC: 6.8 IU/L
PROGEST SERPL-MCNC: 0.6 NG/ML

## 2019-02-22 PROCEDURE — 99282 EMERGENCY DEPT VISIT SF MDM: CPT

## 2019-02-22 ASSESSMENT — MIFFLIN-ST. JEOR: SCORE: 1413.49

## 2019-02-22 ASSESSMENT — ENCOUNTER SYMPTOMS: NERVOUS/ANXIOUS: 1

## 2019-02-22 ASSESSMENT — ANXIETY QUESTIONNAIRES: GAD7 TOTAL SCORE: 13

## 2019-02-22 NOTE — ED AVS SNAPSHOT
[facility] Emergency Department  [address]  [facility] [address]  Phone:  [phone]  Fax:  [phone]                                    Alexi Montemayor   MRN: [id number]    Department:  [facility] Emergency Department   Date of Visit:  2/22/2019           After Visit Summary Signature Page    I have received my discharge instructions, and my questions have been answered. I have discussed any challenges I see with this plan with the nurse or doctor.    ..........................................................................................................................................  Patient/Patient Representative Signature      ..........................................................................................................................................  Patient Representative Print Name and Relationship to Patient    ..................................................               ................................................  Date                                   Time    ..........................................................................................................................................  Reviewed by Signature/Title    ...................................................              ..............................................  Date                                               Time          22EPIC Rev 08/18

## 2019-02-22 NOTE — TELEPHONE ENCOUNTER
Zoloft 100 mg daily started at 2/21/19 appointment with Jaqueline. Routed to provider to review if patient should be starting with lower dose.

## 2019-02-22 NOTE — ED PROVIDER NOTES
History     Chief Complaint:  Anxiety    HPI   Alexi Montemayor is a 33 year old female with a history of anxiety and depression who presents with anxiety. The patient reports that she has been having difficulty sleeping due to anxiety causing her to suffer from night terrors and wake up panicked. She states this difficulty sleeping has caused her to have passive suicidal thoughts, though she denies any active plan. The patient saw her PCP today for anxiety and depression at which time she had labs sent including estradiol, progesterone, lutropin, cortisol and FSH, currently still pending. The patient has a prescription for benzodiazepine (temazepam), but reportedly doesn't use it. She was additionally written for Zoloft today, but has not started it stating she wants to talk to a psychiatrist first due to anxiety related to taking medications. Currently the patient reports to the ED with anxiety related to her suicidal thoughts and fear that she may hurt herself. She states she tried meditation, lavender oil and tea prior to arrival with no improvement in her anxiety. The patient denies any plans to hurt herself currently. Of note, the patient states that she is on leave from work, finishing a program for depression this week and starting a partial hospitalization through [facility] next week for anxiety management.     Allergies:  Penicillins  Sulfa Drugs     Medications:    Zoloft    Past Medical History:    Anxiety  Depression  Panic disorder without agoraphobia  FSH (facioscapulohumeral muscular dystrophy)    Past Surgical History:    Skin mole removal    Family History:    History reviewed. No pertinent family history.     Social History:  Smoking Status: Never Smoker  Alcohol Use: Rarely  Patient presents with her .  Marital Status:       Review of Systems   Psychiatric/Behavioral: Positive for suicidal ideas. Negative for self-injury. The patient is nervous/anxious.    All other systems reviewed and  "are negative.    Physical Exam     Patient Vitals for the past 24 hrs:   BP Temp Temp src Heart Rate Resp SpO2 Height Weight   02/22/19 0015  152/100 98.1  F (36.7  C) Oral 123 18 100 % 1.6 m (5' 3\") 73.9 kg (163 lb)     Physical Exam  Nursing note and vitals reviewed.  Constitutional: Cooperative. Anxious appearing.  HENT:   Mouth/Throat: Mucous membranes are normal.   Cardiovascular: Tachycardic rate, regular rhythm and normal heart sounds.  No murmur.  Pulmonary/Chest: Effort normal and breath sounds normal. No respiratory distress. No wheezes. No rales.   Abdominal: Soft. Normal appearance. There is no tenderness. There is no rigidity and no guarding.   Neurological: Alert. Oriented x4  Skin: Skin is warm and dry.  Psychiatric: Anxious Appearing. Suicidal thoughts without a plan. No hallucinations.     Emergency Department Course     Emergency Department Course:  Past medical records, nursing notes, and vitals reviewed.  0042: I performed an exam of the patient and obtained history, as documented above.     0101: I rechecked the patient. Findings and plan explained to the Patient and spouse. Patient discharged home with instructions regarding supportive care, medications, and reasons to return. The importance of close follow-up was reviewed.     Impression & Plan      Medical Decision Making:  Alexi Montemayor is a 33 year old female who is struggling with management of her OCD, anxiety and depression. She has been recommended pharmacologic therapy with SSRI, as well as benzodiazepine, but is hesitant to take these. We did discuss in detail equivalent dosing between Lorazepam and Temazepam. I think Temazepam would be a very reasonable choice for short term insomnia and the night terrors that she is experiencing. She has excellent mental health outpatient follow up; she is currently completing a depression partial hospitalization course and is scheduled at [facility] for anxiety and OCD management starting next " week. There is psychiatry that can evaluate her and manage her at these facilities. She has had passive suicidal ideation, but no plan and she does not feel that she would act on these. She has a supportive  who is here. No indication for inpatient management or involuntary hold at this time. No concern for toxicologic ingestion or psychosis. At this point she knows the Emergency Department is always open should she start feeling unsafe in any way, but I think her current management strategy is reasonable.     Diagnosis:    ICD-10-CM    1. Insomnia, unspecified type G47.00    2. Anxiety and depression F41.9     F32.9        Disposition:  Discharged to home.    Discharge Medications:     Medication List      There are no discharge medications for this visit.           Rosmery Reardon  2/22/2019   [facility] EMERGENCY DEPARTMENT  I, Rosmery Reardon, am serving as a scribe at 12:42 AM on 2/22/2019 to document services personally performed by Lazaro Garcia MD based on my observations and the provider's statements to me.        Lazaro Garcia MD  02/22/19 0126

## 2019-02-22 NOTE — ED TRIAGE NOTES
"Can't sleep without interruption. Having night terrors, waking up panicking. Is on a \"partial depression program.\" Denies any SI/HI. History of anxiety and depression. Was just prescribed zoloft toady, but concern about the side effects and have not taken it. Here for eval. ABCs intact.  "

## 2019-02-22 NOTE — PROGRESS NOTES
Adult Mental Health Partial Hospitalization Group Therapy Progress Note       Client Initial Individualized Goals for Treatment: Learn coping skills for symptom management for anxiety and depression stabilization.     Treatment Goals:  1) Safety:     Client will notify staff when needing assistance to develop or implement a coping plan to manage suicidal or self injurious urges.    Client will use coping plan for safety, as needed.  2) Symptom Management: Alexi will:    Process triggers and stressors and identify skills to help handle the anxiety.    3) In OT life skills Alexi will:    Learn, practice, apply 2 skills/strategies that support lifestyle balance/structure/routine with an emphasis on self care time for improved quality of life as she transitions back to work.     Learn, practice, generalize 2 sensory modulation or sensorimotor mindfulness based self regulation skills for improved  distress tolerance.   As measured by self report and observation during groups daily.   4) Discharge Preparation: Alexi will:    Increase effective use of support / increase ability to ask for help by inviting someone to the Support Network Education group to discuss your support needs.     Develop an aftercare / transition plan by discharge date that includes therapy and support services.    Area of Treatment Focus:  Symptom Management and Develop / Improve Independent Living Skills    Therapeutic Interventions/Treatment Strategies:  Support, Feedback, Structured Activity, Problem Solving, Clarification and Education    Response to Treatment Strategies:  Accepted Feedback, Listened, Focused on Goals, Attentive, Accepted Support and Alert    Date: 2/21/19  Name of Groups:    1) Mental Health Management: 11:00-11:50  2) OT life skills: 1:00-1:50  Group Participants: 4    Description and Outcome:    1) Provided psychoeducation, experiential practice, and reflection on self-regulation: sensorimotor mindfulness. Purpose is to  "integrate patients growing understanding and knowledge of mindfulness with their awareness of their sensory systems (internal and external, interoception) by incorporating an evidenced guided mindful movement and breathing practice to deepen self-awareness around parasympathetic activation and developing self-regulation skills for improved day to day functioning. Alexi participates easily, demonstrates good effort and with mixed results. Reports she found the practice somewhat calming and needed guidance to identify one small way she can apply the content to her daily life. She demonstrates difficulty with problem solving and even slowed cognitive processing. She reports she has not been sleeping and validation and support provided. Encouraged her to take the presecribed sleep medication to help her over this rough spot but she is guarded and hesitant. She would benefit from additional opportunities to practice the content to be able to generalize it to her everyday life with increased intentionality, consistency, and efficacy in support of her psychiatric recovery.      2) Alexi attended and participated in a structured life skills psychoeducation group where intervention focuses on experiential learning, developing through practice, and generalizing taught skills. Through the use of supported social interactions, structured therapeutic and functional tasks in context, group members work towards gaining self awareness, stabilizing and managing mental health symptoms for improved participation and function in valued roles, routines, relationships, and independent living.     Today Alexi focused on a calming repetitive activity that she chose. She engages in milieu conversation around medications and receives validation and sound support from her peers. Alexi has a difficult time getting out of her head but would not consider a more engaging activity as a healthy distraction, preferring to \"be in her head\". She is " struggling with ruminations and anxiety and is looking forward to the [facility]. She would benefit from additional opportunities to practice the content to be able to generalize it to his everyday life with increased intentionality, consistency, and efficacy in support of his psychiatric recovery.     Progress towards ITP goals: Alexi worked towards ITP goal number 3 today.    Is this a Weekly Review of the Progress on the Treatment Plan?  No.

## 2019-02-24 ENCOUNTER — NURSE TRIAGE (OUTPATIENT)
Dept: NURSING | Facility: CLINIC | Age: 34
End: 2019-02-24

## 2019-02-24 ENCOUNTER — HOSPITAL ENCOUNTER (EMERGENCY)
Facility: CLINIC | Age: 34
Discharge: HOME | End: 2019-02-24
Attending: EMERGENCY MEDICINE | Admitting: EMERGENCY MEDICINE
Payer: COMMERCIAL

## 2019-02-24 VITALS
TEMPERATURE: 98 F | DIASTOLIC BLOOD PRESSURE: 76 MMHG | OXYGEN SATURATION: 98 % | SYSTOLIC BLOOD PRESSURE: 126 MMHG | RESPIRATION RATE: 16 BRPM

## 2019-02-24 DIAGNOSIS — G47.00 INSOMNIA, UNSPECIFIED TYPE: ICD-10-CM

## 2019-02-24 DIAGNOSIS — F41.9 ANXIETY: ICD-10-CM

## 2019-02-24 PROCEDURE — 99282 EMERGENCY DEPT VISIT SF MDM: CPT

## 2019-02-24 RX ORDER — TEMAZEPAM 15 MG/1
7.5 CAPSULE ORAL
COMMUNITY
End: 2019-11-29

## 2019-02-24 RX ORDER — LANOLIN ALCOHOL/MO/W.PET/CERES
3 CREAM (GRAM) TOPICAL
Qty: 10 TABLET | Refills: 0 | Status: SHIPPED | OUTPATIENT
Start: 2019-02-24 | End: 2019-03-26

## 2019-02-24 RX ORDER — HYDROXYZINE PAMOATE 25 MG/1
25-50 CAPSULE ORAL 3 TIMES DAILY PRN
Qty: 30 CAPSULE | Refills: 0 | Status: SHIPPED | OUTPATIENT
Start: 2019-02-24 | End: 2019-03-06

## 2019-02-24 ASSESSMENT — ENCOUNTER SYMPTOMS
SLEEP DISTURBANCE: 1
COUGH: 0
SHORTNESS OF BREATH: 1
SORE THROAT: 0
FEVER: 0
HALLUCINATIONS: 0
NERVOUS/ANXIOUS: 1

## 2019-02-24 NOTE — ED PROVIDER NOTES
History     Chief Complaint:  Anxiety    HPI   Alexi Montemayor is a 33 year old female with a history of anxiety, panic disorder and depression who presents with anxiety. The patient reports that she has had fleeting, passive thoughts of wanting to harm herself for the past month. This includes thoughts like what if she were to overdose on ibuprofen or stab herself. However, she states she has no intentions or plan to actually harm herself. She notes her  has also hid all their guns and she has no history of suicide attempts. She reports she has also had some passive thoughts of harming others in the past week. These passive suicidal and homicidal thoughts are worse at night and she has been having difficulty sleeping. She notes she was prescribed Temazepam 5 days ago by a psychiatrist at [facility], however she was afraid to start this medication. She presented to the ED 2 days ago for continued symptoms and was recommended to start taking this. She reports she took this 2 nights ago with some improvement, however last night she started having darkening, nightmare-bebeto thoughts. She states she only got 3-4 hours of sleep. She also complains of some shortness of breath which she attributes to her anxiety, but she has not had any chest pain. Her anxiety is also worse when her  is not home, as he often works. Due to persistent symptoms and concerns for lack of sleep, she presents to the ED for further evaluation and management of her anxiety. She notes her anxiety worsens when she is on her period. She also reports she has had recent stressors with her mother on hospice and her father being a diabetic alcoholic. She also notes her son was in an accident 2 years ago and sustained a brain injury. She denies having any visual or auditory hallucinations. She has no history of hospitalization for mental health. She is afebrile and denies having any sore throat, or cough. Of note, she reports a family  history of anxiety and depression with her sister.    Allergies:  Penicillins  Sulfa Drugs    Medications:    Temazepam (RESTORIL)    Past Medical History:    Anxiety  Depression  FSH (facioscapulohumeral muscular dystrophy)  Panic disorder     Past Surgical History:    Skin mole removal    Family History:    Hypertension  Multiple sclerosis   Anxiety  Depression   Diabetes     Social History:  The patient presents to the ED with her 12-year-old son.  Marital status:   Smoking status: Never Smoker  Alcohol use: No  Drug use: No    Review of Systems   Constitutional: Negative for fever.   HENT: Negative for sore throat.    Respiratory: Positive for shortness of breath. Negative for cough.    Cardiovascular: Negative for chest pain.   Psychiatric/Behavioral: Positive for sleep disturbance and suicidal ideas. Negative for hallucinations and self-injury. The patient is nervous/anxious.         Passive homicidal ideation   All other systems reviewed and are negative.    Physical Exam     Patient Vitals for the past 24 hrs:   BP Temp Temp src Heart Rate Resp SpO2   02/24/19 1001 -- 98  F (36.7  C) Oral -- -- --   02/24/19 0956 126/76 -- -- 95 16 98 %       Physical Exam  Constitutional: Well developed, nontox appearance  Head: Atraumatic.   Mouth/Throat: Oropharynx is clear and moist.   Neck:  no stridor  Eyes: no scleral icterus  Cardiovascular: RRR, 2+ bilat radial pulses  Pulmonary/Chest: nml resp effort,   Ext: Warm, well perfused, no edema  Neurological: A&O, symmetric facies, moves ext x4  Skin: Skin is warm and dry.   Psychiatric: Does not appear to be responding to internal stimuli, endorses passive HI.  Anxious.  Intermittently tearful.  Behavior is normal. Thought content normal.   Nursing note and vitals reviewed.    Emergency Department Course     Emergency Department Course:  Past medical records, nursing notes, and vitals reviewed.  1015: I performed an exam of the patient and obtained history, as  documented above.     Findings and plan explained to the patient. Patient discharged home with instructions regarding supportive care, medications, and reasons to return. The importance of close follow-up was reviewed.     Impression & Plan      Medical Decision Makin y.o. F presenting with insomnia, passive SI, passive thoughts of harming other people    DDx includes insomnia NOS, generalized anxiety disorder, major depressive disorder, psychiatric disorder NOS.  I had a long discussion with the patient regarding passive suicidal ideation and passive thoughts of harming others.  The patient reports that she would never act on these and feels safe going home but she is mainly concerned about her insomnia and the possible subsequent effects.  She denies thoughts of harming her family.  When questioned further, the pt denies plan to hurt people or specific people to hurt, just that she had passive vague thoughts of hurting others while watching her son's basketball game because the crowd was loud and distracting and the pt felt difficulty concentrating.  Overall she seems very anxious to the point where she is concerned about taking anti-anxiety medication due to to the possible side effects.  She has an appointment with Nathan to start outpatient management tomorrow.  Patient is able to contract for safety.  I advised her to continue to use her temazepam but also provided her a prescription for hydroxyzine and melatonin should she prefer to try different medication to help with her insomnia.  At this time I feel the patient is safe for discharge.  Recommendations given regarding follow-up tomorrow as previously scheduled.  Recommendations also given regarding return to the emergency department for new or worsening symptoms including worsening thoughts of self-harm or harming others or an inability to stay safe at home.  Patient is understanding and agreeable to plan.  She was subsequently discharged in stable  condition.    Diagnosis:    ICD-10-CM   1. Insomnia, unspecified type G47.00   2. Anxiety F41.9       Disposition:  Discharged to home    Discharge Medications:   Details   hydrOXYzine (VISTARIL) 25 MG capsule Take 1-2 capsules (25-50 mg) by mouth 3 times daily as needed for anxiety (insomnia)  Qty: 30 capsule, Refills: 0      melatonin 3 MG tablet Take 1 tablet (3 mg) by mouth nightly as needed for sleep  Qty: 10 tablet, Refills: 0     Niya Ko  2/24/2019   [facility] EMERGENCY DEPARTMENT  I, Niya Ko, am serving as a scribe at 10:15 AM on 2/24/2019 to document services personally performed by Oswaldo Thomson MD based on my observations and the provider's statements to me.        Oswaldo Thomson MD  02/25/19 0106

## 2019-02-24 NOTE — TELEPHONE ENCOUNTER
Passive thoughts of suicide. She wanted to know if she would be admitted. I told her it's up to the ER MD that does the assessment as to what next steps will be.  She has taken the prescribed Temazepam and has had suicidal thoughts but doesn't think she would act on it. She spoke with her pharmacist who explained to her that it can be a side effect of the medication. She won't take it again. She hasn't slept for one week, except for the one night. She will have someone bring her to the ER for evaluation.  Martina Isbell RN-[facility]      Reason for Disposition    Patient sounds very sick or weak to the triager    Additional Information    Negative: Difficulty breathing    Depression is suspected    Negative: Patient attempted suicide    Negative: Patient is threatening suicide now    Negative: Patient is threatening to kill a specific person    Negative: [1] Patient is very confused (disoriented, slurred speech) AND [2] no other adult (e.g., friend or family member) available    Negative: [1] Difficult to awaken or acting very confused (disoriented, slurred speech) AND [2] new onset    Negative: Sounds like a life-threatening emergency to the triager    Negative: Alcohol use, abuse or dependence, question or problem related to    Negative: Drug abuse or dependence, question or problem related to    Negative: Bipolar disorder (manic depression)    Negative: Depression during the postpartum period (< 1 year since delivery)    Negative: [1] Depression AND [2] unable to do any of normal activities (e.g., self care, school, work; in comparison to baseline).    Negative: Bizarre or confused behavior    Protocols used: DEPRESSION-ADULT-AH, INSOMNIA-ADULT-AH

## 2019-02-24 NOTE — ED AVS SNAPSHOT
[facility] Emergency Department  [address]  [facility] [address]  Phone:  [phone]  Fax:  [phone]                                    Alexi Montemayor   MRN: [id number]    Department:  [facility] Emergency Department   Date of Visit:  2/24/2019           After Visit Summary Signature Page    I have received my discharge instructions, and my questions have been answered. I have discussed any challenges I see with this plan with the nurse or doctor.    ..........................................................................................................................................  Patient/Patient Representative Signature      ..........................................................................................................................................  Patient Representative Print Name and Relationship to Patient    ..................................................               ................................................  Date                                   Time    ..........................................................................................................................................  Reviewed by Signature/Title    ...................................................              ..............................................  Date                                               Time          22EPIC Rev 08/18

## 2019-02-24 NOTE — DISCHARGE INSTRUCTIONS
You may increase your temazepam dosing to 15 mg nightly as needed for sleep.    If you are not using the temazepam, you may use hydroxyzine as prescribed.    Please follow-up with your program scheduled for tomorrow.    Please return to the emergency department as needed for new or worsening symptoms including increased thoughts of self-harm or harming others, hearing voices or seeing things or people that are not there, any other concerning symptoms.

## 2019-02-24 NOTE — ED TRIAGE NOTES
Pt presents to ED with concerns of not sleeping well for the past weeks. Pt states that she feels anxious. Pt states that she has had suicidal thoughts recently. ABC intact. A/O x4.

## 2019-02-26 NOTE — TELEPHONE ENCOUNTER
Pt called and I read this message from Jaqueline Godoy.  Pt said she would take this under advisement but is in contact with another Dr. Macie Knowles  Pt Service Rep

## 2019-03-18 ENCOUNTER — OFFICE VISIT (OUTPATIENT)
Dept: OPTOMETRY | Facility: CLINIC | Age: 34
End: 2019-03-18
Payer: COMMERCIAL

## 2019-03-18 DIAGNOSIS — H52.13 MYOPIA OF BOTH EYES: Primary | ICD-10-CM

## 2019-03-18 PROCEDURE — 92014 COMPRE OPH EXAM EST PT 1/>: CPT | Performed by: OPTOMETRIST

## 2019-03-18 PROCEDURE — 92310 CONTACT LENS FITTING OU: CPT | Performed by: OPTOMETRIST

## 2019-03-18 PROCEDURE — 92015 DETERMINE REFRACTIVE STATE: CPT | Performed by: OPTOMETRIST

## 2019-03-18 RX ORDER — HYDROXYZINE HYDROCHLORIDE 25 MG/1
TABLET, FILM COATED ORAL
COMMUNITY
Start: 2019-03-09 | End: 2021-02-16

## 2019-03-18 ASSESSMENT — REFRACTION_CURRENTRX
OD_SPHERE: -6.00
OS_DIAMETER: 14.0
OD_BASECURVE: 8.40
OS_SPHERE: -6.00
OD_DIAMETER: 14.0
OS_BASECURVE: 8.40

## 2019-03-18 ASSESSMENT — TONOMETRY
IOP_METHOD: APPLANATION
OS_IOP_MMHG: 16
OD_IOP_MMHG: 16

## 2019-03-18 ASSESSMENT — REFRACTION_MANIFEST
OS_CYLINDER: +0.25
OD_SPHERE: -6.00
OD_SPHERE: -5.75
METHOD_AUTOREFRACTION: 1
OD_AXIS: 011
OD_AXIS: 110
OS_SPHERE: -6.50
OS_AXIS: 034
OS_SPHERE: -6.25
OS_CYLINDER: SPHERE
OD_CYLINDER: +0.25
OD_CYLINDER: +0.25

## 2019-03-18 ASSESSMENT — CUP TO DISC RATIO
OD_RATIO: 0.3
OS_RATIO: 0.3

## 2019-03-18 ASSESSMENT — REFRACTION_WEARINGRX
OD_SPHERE: -6.50
SPECS_TYPE: SVL
OS_CYLINDER: SPHERE
OD_CYLINDER: SPHERE
OS_SPHERE: -6.75

## 2019-03-18 ASSESSMENT — EXTERNAL EXAM - LEFT EYE: OS_EXAM: NORMAL

## 2019-03-18 ASSESSMENT — VISUAL ACUITY
OD_SC: HM
OD_CC: 20/20
CORRECTION_TYPE: GLASSES
OS_CC: 20/20
OD_CC: 20/20
OS_SC: HM
METHOD: SNELLEN - LINEAR
OS_CC: 20/20

## 2019-03-18 ASSESSMENT — CONF VISUAL FIELD
OD_NORMAL: 1
METHOD: COUNTING FINGERS
OS_NORMAL: 1

## 2019-03-18 ASSESSMENT — SLIT LAMP EXAM - LIDS: COMMENTS: THIN STRIP

## 2019-03-18 ASSESSMENT — KERATOMETRY
OS_K2POWER_DIOPTERS: 45.00
OS_K1POWER_DIOPTERS: 43.62
OD_K1POWER_DIOPTERS: 44.25
OS_AXISANGLE_DEGREES: 93
OD_AXISANGLE2_DEGREES: 165
OS_AXISANGLE2_DEGREES: 003
OD_K2POWER_DIOPTERS: 45.00
OD_AXISANGLE_DEGREES: 75
METHOD_AUTO_MANUAL: AUTOMATED

## 2019-03-18 ASSESSMENT — EXTERNAL EXAM - RIGHT EYE: OD_EXAM: NORMAL

## 2019-03-18 NOTE — PROGRESS NOTES
Chief Complaint   Patient presents with     Annual Eye Exam     Contact Lens Prescription      Brand Base Curve Diameter Sphere Lens   Right Vistakon 8.40 14.0 -6.00 Acuvue Oasys   Left Vistakon 8.40 14.0 -6.00 Acuvue Oasys   Expiration Date: 8/22/2019     Wants updated CL rx, in Acuvue Oasys      Previous contact lens wearer? Yes  Comfort of contact lenses :Good  Satisfied with current lenses: Yes  Wearing glasses at work which has helped with dryness      Last Eye Exam: 2017 Dr. Chahal  Dilated Previously: Yes    What are you currently using to see?  glasses, contacts    Distance Vision Acuity: Satisfied with vision    Near Vision Acuity: Satisfied with vision while reading  unaided    Eye Comfort: good  Do you use eye drops? 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       OBJECTIVE: See Ophthalmology exam    ASSESSMENT:    ICD-10-CM    1. Myopia of both eyes H52.13       PLAN:   Discussed normal versus abnormal s/s floaters and need for attention  Recommend dilating at next visit for better view    Radha Soto OD

## 2019-03-18 NOTE — PATIENT INSTRUCTIONS
Once your contact lens prescription is finalized and you are not having any problems with the trials or current lenses you can order your supply of lenses.   You can order your contact lenses online at [email or web address].  Click on services at the top of the page, then eye care and you will see the link to order contact lenses.  You can also order contact lenses at [phone]. There is no shipping fee if you order an annual supply otherwise  be sure to let them know which office you would like to  the lenses, Kimi [phone].

## 2019-03-18 NOTE — LETTER
3/18/2019         RE: Alexi Montemayor  [address] [facility] [address]        Dear Colleague,    Thank you [facility] Please see a copy of my visit note below.    Chief Complaint   Patient presents with     Annual Eye Exam     Contact Lens Prescription      Brand Base Curve Diameter Sphere Lens   Right Vistakon 8.40 14.0 -6.00 Acuvue Oasys   Left Vistakon 8.40 14.0 -6.00 Acuvue Oasys   Expiration Date: 8/22/2019     Wants updated CL rx, in Acuvue Oasys      Previous contact lens wearer? Yes  Comfort of contact lenses :Good  Satisfied with current lenses: Yes  Wearing glasses at work which has helped with dryness      Last Eye Exam: 2017 Dr. Chahal  Dilated Previously: Yes    What are you currently using to see?  glasses, contacts    Distance Vision Acuity: Satisfied with vision    Near Vision Acuity: Satisfied with vision while reading  unaided    Eye Comfort: good  Do you use eye drops? 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       OBJECTIVE: See Ophthalmology exam    ASSESSMENT:    ICD-10-CM    1. Myopia of both eyes H52.13       PLAN:   Discussed normal versus abnormal s/s floaters and need for attention  Recommend dilating at next visit for better view    Radha Soto OD                   Again, thank you for allowing me to participate in the care of your patient.        Sincerely,        Radha Soto, OD

## 2019-03-22 ENCOUNTER — COMMUNICATION - HEALTHEAST (OUTPATIENT)
Dept: FAMILY MEDICINE | Facility: CLINIC | Age: 34
End: 2019-03-22

## 2019-03-23 ENCOUNTER — AMBULATORY - HEALTHEAST (OUTPATIENT)
Dept: FAMILY MEDICINE | Facility: CLINIC | Age: 34
End: 2019-03-23

## 2019-03-23 DIAGNOSIS — R40.0 SOMNOLENCE: ICD-10-CM

## 2019-05-20 ENCOUNTER — OFFICE VISIT (OUTPATIENT)
Dept: ORTHOPEDICS | Facility: CLINIC | Age: 34
End: 2019-05-20

## 2019-05-20 VITALS
SYSTOLIC BLOOD PRESSURE: 120 MMHG | DIASTOLIC BLOOD PRESSURE: 76 MMHG | HEIGHT: 63 IN | BODY MASS INDEX: 30.48 KG/M2 | WEIGHT: 172 LBS

## 2019-05-20 DIAGNOSIS — M94.0 COSTOCHONDRITIS, ACUTE: ICD-10-CM

## 2019-05-20 DIAGNOSIS — S29.011A STRAIN OF RIGHT PECTORALIS MUSCLE, INITIAL ENCOUNTER: Primary | ICD-10-CM

## 2019-05-20 DIAGNOSIS — S16.1XXA STRAIN OF NECK MUSCLE, INITIAL ENCOUNTER: ICD-10-CM

## 2019-05-20 PROCEDURE — 99213 OFFICE O/P EST LOW 20 MIN: CPT | Performed by: FAMILY MEDICINE

## 2019-05-20 RX ORDER — CITALOPRAM HYDROBROMIDE 20 MG/1
TABLET ORAL
Refills: 1 | COMMUNITY
Start: 2019-04-15 | End: 2020-12-15

## 2019-05-20 ASSESSMENT — MIFFLIN-ST. JEOR: SCORE: 1454.32

## 2019-05-20 NOTE — PATIENT INSTRUCTIONS
1. Strain of right pectoralis muscle, initial encounter    2. Strain of neck muscle, initial encounter    3. Costochondritis, acute      -Patient has right chest wall pain due to inflammation of the cartilage and strain of the overlying muscles.  She also has chronic left paracervical muscle strains that have not improved with chiropractic treatments and a few sessions of physical therapy.  -Patient will start formal physical therapy and home exercise program.  -Patient was offered oral anti-inflammatory medications which she is refusing at this time.  -Patient will follow up with pain does not improve  -Call direct clinic number [[phone]] at any time with questions or concerns.    Albert Yeo MD [facility]  [facility] [place]

## 2019-05-20 NOTE — PROGRESS NOTES
ASSESSMENT & PLAN  Patient Instructions     1. Strain of right pectoralis muscle, initial encounter    2. Strain of neck muscle, initial encounter    3. Costochondritis, acute      -Patient has right chest wall pain due to inflammation of the cartilage and strain of the overlying muscles.  She also has chronic left paracervical muscle strains that have not improved with chiropractic treatments and a few sessions of physical therapy.  -Patient will start formal physical therapy and home exercise program.  -Patient was offered oral anti-inflammatory medications which she is refusing at this time.  -Patient will follow up with pain does not improve  -Call direct clinic number [[phone]] at any time with questions or concerns.    Albert Yeo MD [facility] and Sports Medicine  [facility]          -----    SUBJECTIVE  Alexi Montemayor is a/an 33 year old Right handed female who is seen as a self referral for evaluation of right shoulder pain. The patient is seen by themselves.    Onset: approximately 5-6 month(s) ago, patient states right chest pain began late fall, before November 2018. Patient describes injury as  Getting off the couch while reaching, she notes she felt a tearing sensation across the right anterior chest.   Location of Pain: right anterior chest  Rating of Pain at worst: 6/10  Rating of Pain Currently: 4/10  Worsened by: deep breath exhalation, reaching across the body, and lifting  Better with: sitting/ resting  Treatments tried: ibuprofen, massage, heating pad, initial icing.  Associated symptoms: patient states the pain is tolerable, but can become very sharp.  Patient has myofascial pain/ knot in the left shoulder region.   Orthopedic history: NO  Relevant surgical history: NO  Social history: social history: works as pharmacy tech.    Past Medical History:   Diagnosis Date     Anxiety      Depression      Social History     Socioeconomic History     Marital  status:      Spouse name: Not on file     Number of children: Not on file     Years of education: Not on file     Highest education level: Not on file   Occupational History     Not on file   Social Needs     Financial resource strain: Not on file     Food insecurity:     Worry: Not on file     Inability: Not on file     Transportation needs:     Medical: Not on file     Non-medical: Not on file   Tobacco Use     Smoking status: Never Smoker     Smokeless tobacco: Never Used   Substance and Sexual Activity     Alcohol use: Yes     Comment: Rarely     Drug use: No     Sexual activity: Yes     Partners: Male     Birth control/protection: Condom   Lifestyle     Physical activity:     Days per week: Not on file     Minutes per session: Not on file     Stress: Not on file   Relationships     Social connections:     Talks on phone: Not on file     Gets together: Not on file     Attends Sabianism service: Not on file     Active member of club or organization: Not on file     Attends meetings of clubs or organizations: Not on file     Relationship status: Not on file     Intimate partner violence:     Fear of current or ex partner: Not on file     Emotionally abused: Not on file     Physically abused: Not on file     Forced sexual activity: Not on file   Other Topics Concern     Parent/sibling w/ CABG, MI or angioplasty before 65F 55M? No   Social History Narrative     Not on file         Patient's past medical, surgical, social, and family histories were reviewed today and no changes are noted.    REVIEW OF SYSTEMS:  10 point ROS is negative other than symptoms noted above in HPI, Past Medical History or as stated below  Constitutional: NEGATIVE for fever, chills, change in weight  Skin: NEGATIVE for worrisome rashes, moles or lesions  GI/: NEGATIVE for bowel or bladder changes  Neuro: NEGATIVE for weakness, dizziness or paresthesias    OBJECTIVE:  /76 (BP Location: Right arm, Patient Position: Chair, Cuff  "Size: Adult Regular)   Ht 1.6 m (5' 3\")   Wt 78 kg (172 lb)   BMI 30.47 kg/m     General: healthy, alert and in no distress  HEENT: no scleral icterus or conjunctival erythema  Skin: no suspicious lesions or rash. No jaundice.  CV: regular rhythm by palpation  Resp: normal respiratory effort without conversational dyspnea   Psych: normal mood and affect  Gait: normal steady gait with appropriate coordination and balance  Neuro: normal light touch sensory exam of the bilateral upper extremities.    MSK:  RIGHT SHOULDER  Inspection:    no swelling, bruising, discoloration, or obvious deformity or asymmetry  Palpation:    Tender about the sternum, right sternal border, pectoral major musculature. Remainder of bony and tendinous landmarks are nontender.  Active Range of Motion:     Abduction normal0, FF normal0, ER normal0, IR normal.      Scapular dyskinesis absent  Strength:    Scapular plane abduction 5/5,  ER 5/5, IR 5/5, biceps 5/5, triceps 5/5  Special Tests:    Positive: pain with resisted adduction      CERVICAL SPINE  Inspection:    normal cervical lordosis present, rounded shoulders, forward head posture  Palpation:    Tender about the paracervical musculature (left), levator scapula (left), upper trapezius (left) and lower trapezius (left). Otherwise remainder of the landmarks and nontender.  Range of Motion:     Flexion within normal limits    Extension within normal limits    Right side bend within normal limits    Left side bend within normal limits    Right rotation within normal limits    Left rotation within normal limits  Strength:    Full strength throughout all neck muscles  Special Tests:    Positive: None    Negative: Spurling's (bilateral), Underwood's (bilateral)    Independent visualization of the below image:  No results found for this or any previous visit (from the past 24 hour(s)).          Albert Yeo MD [facility]    "

## 2019-05-20 NOTE — LETTER
5/20/2019         RE: Alexi Montemayor  [address] [address] [address]        Dear Colleague,    Thank you for referring your patient, Alexi Montemayor, to the [facility]. Please see a copy of my visit note below.    ASSESSMENT & PLAN  Patient Instructions     1. Strain of right pectoralis muscle, initial encounter    2. Strain of neck muscle, initial encounter    3. Costochondritis, acute      -Patient has right chest wall pain due to inflammation of the cartilage and strain of the overlying muscles.  She also has chronic left paracervical muscle strains that have not improved with chiropractic treatments and a few sessions of physical therapy.  -Patient will start formal physical therapy and home exercise program.  -Patient was offered oral anti-inflammatory medications which she is refusing at this time.  -Patient will follow up with pain does not improve  -Call direct clinic number [[phone]] at any time with questions or concerns.    Albert Yeo MD [facility] Orthopedics and Sports Medicine  [facility]          -----    SUBJECTIVE  Alexi Montemayor is a/an 33 year old Right handed female who is seen as a self referral for evaluation of right shoulder pain. The patient is seen by themselves.    Onset: approximately 5-6 month(s) ago, patient states right chest pain began late fall, before November 2018. Patient describes injury as  Getting off the couch while reaching, she notes she felt a tearing sensation across the right anterior chest.   Location of Pain: right anterior chest  Rating of Pain at worst: 6/10  Rating of Pain Currently: 4/10  Worsened by: deep breath exhalation, reaching across the body, and lifting  Better with: sitting/ resting  Treatments tried: ibuprofen, massage, heating pad, initial icing.  Associated symptoms: patient states the pain is tolerable, but can become very sharp.  Patient has myofascial pain/ knot in the left shoulder  region.   Orthopedic history: NO  Relevant surgical history: NO  Social history: social history: works as pharmacy tech.    Past Medical History:   Diagnosis Date     Anxiety      Depression      Social History     Socioeconomic History     Marital status:      Spouse name: Not on file     Number of children: Not on file     Years of education: Not on file     Highest education level: Not on file   Occupational History     Not on file   Social Needs     Financial resource strain: Not on file     Food insecurity:     Worry: Not on file     Inability: Not on file     Transportation needs:     Medical: Not on file     Non-medical: Not on file   Tobacco Use     Smoking status: Never Smoker     Smokeless tobacco: Never Used   Substance and Sexual Activity     Alcohol use: Yes     Comment: Rarely     Drug use: No     Sexual activity: Yes     Partners: Male     Birth control/protection: Condom   Lifestyle     Physical activity:     Days per week: Not on file     Minutes per session: Not on file     Stress: Not on file   Relationships     Social connections:     Talks on phone: Not on file     Gets together: Not on file     Attends Restorationist service: Not on file     Active member of club or organization: Not on file     Attends meetings of clubs or organizations: Not on file     Relationship status: Not on file     Intimate partner violence:     Fear of current or ex partner: Not on file     Emotionally abused: Not on file     Physically abused: Not on file     Forced sexual activity: Not on file   Other Topics Concern     Parent/sibling w/ CABG, MI or angioplasty before 65F 55M? No   Social History Narrative     Not on file         Patient's past medical, surgical, social, and family histories were reviewed today and no changes are noted.    REVIEW OF SYSTEMS:  10 point ROS is negative other than symptoms noted above in HPI, Past Medical History or as stated below  Constitutional: NEGATIVE for fever, chills, change  "in weight  Skin: NEGATIVE for worrisome rashes, moles or lesions  GI/: NEGATIVE for bowel or bladder changes  Neuro: NEGATIVE for weakness, dizziness or paresthesias    OBJECTIVE:  /76 (BP Location: Right arm, Patient Position: Chair, Cuff Size: Adult Regular)   Ht 1.6 m (5' 3\")   Wt 78 kg (172 lb)   BMI 30.47 kg/m      General: healthy, alert and in no distress  HEENT: no scleral icterus or conjunctival erythema  Skin: no suspicious lesions or rash. No jaundice.  CV: regular rhythm by palpation  Resp: normal respiratory effort without conversational dyspnea   Psych: normal mood and affect  Gait: normal steady gait with appropriate coordination and balance  Neuro: normal light touch sensory exam of the bilateral upper extremities.    MSK:  RIGHT SHOULDER  Inspection:    no swelling, bruising, discoloration, or obvious deformity or asymmetry  Palpation:    Tender about the sternum, right sternal border, pectoral major musculature. Remainder of bony and tendinous landmarks are nontender.  Active Range of Motion:     Abduction normal0, FF normal0, ER normal0, IR normal.      Scapular dyskinesis absent  Strength:    Scapular plane abduction 5/5,  ER 5/5, IR 5/5, biceps 5/5, triceps 5/5  Special Tests:    Positive: pain with resisted adduction      CERVICAL SPINE  Inspection:    normal cervical lordosis present, rounded shoulders, forward head posture  Palpation:    Tender about the paracervical musculature (left), levator scapula (left), upper trapezius (left) and lower trapezius (left). Otherwise remainder of the landmarks and nontender.  Range of Motion:     Flexion within normal limits    Extension within normal limits    Right side bend within normal limits    Left side bend within normal limits    Right rotation within normal limits    Left rotation within normal limits  Strength:    Full strength throughout all neck muscles  Special Tests:    Positive: None    Negative: Spurling's (bilateral), Underwood's " (bilateral)    Independent visualization of the below image:  No results found for this or any previous visit (from the past 24 hour(s)).          Albert Yeo MD [facility] Orthopedic Care      Again, thank you for allowing me to participate in the care of your patient.        Sincerely,        Albert Yeo, MD

## 2019-05-31 ENCOUNTER — THERAPY VISIT (OUTPATIENT)
Dept: PHYSICAL THERAPY | Facility: CLINIC | Age: 34
End: 2019-05-31

## 2019-05-31 DIAGNOSIS — S29.011A STRAIN OF RIGHT PECTORALIS MUSCLE, INITIAL ENCOUNTER: ICD-10-CM

## 2019-05-31 DIAGNOSIS — S16.1XXA STRAIN OF NECK MUSCLE, INITIAL ENCOUNTER: ICD-10-CM

## 2019-05-31 PROBLEM — S29.011D: Status: ACTIVE | Noted: 2019-05-31

## 2019-05-31 PROCEDURE — 97110 THERAPEUTIC EXERCISES: CPT | Mod: GP | Performed by: PHYSICAL THERAPIST

## 2019-05-31 PROCEDURE — 97161 PT EVAL LOW COMPLEX 20 MIN: CPT | Mod: GP | Performed by: PHYSICAL THERAPIST

## 2019-05-31 PROCEDURE — 97112 NEUROMUSCULAR REEDUCATION: CPT | Mod: GP | Performed by: PHYSICAL THERAPIST

## 2019-05-31 NOTE — PROGRESS NOTES
Physical Therapy Initial Evaluation    Precautions/Restrictions/MD instructions: PT eval and treat.       Subjective History:    Injury/Condition Details:  Presenting Complaint Alexi presents with R) shoulder and neck pain. Was getting off the couch in November, felt a tearing sensation in the front of the chest and has had pain since that time. Had been moving around that time, might have been strained already.  Improved significantly initially, but has been having more pain in the last week. Notices a lot of tightness in her chest, has some tenderness with breathing. History of tension in her neck, never seems to really clear up. Worsens with stress and anxiety.   Onset Timing/Date Nov 2018 onset; MD referral 5/20/19    Mechanism Getting up from couch      Symptom Behavior Details   Primary Pain Symptoms Location: R>L chest, pectoralis  Quality: aching   Frequency: intermittent   Worst Pain 4/10   Best Pain 0/10   Symptom Provocators Deep breathing   Symptom Relievers Sometimes massage (can also flare it up sometimes)   Time of day dependent? no   Symptom change since onset? Initially improved, worsened over past week      Prior Testing/Intervention for current condition:  Prior Tests X-ray (negative)   Prior Treatment PT for neck, chiropractic      Lifestyle & General Medical History  General Health Reported by Patient good   Employment/Activities Pharmacy tech   Pertinent medical/surgical history Depression, headaches, anxiety   Patient Goals Reduce pain     SHOULDER:    Cervical Screen: WNL    Shoulder: AROM WNL, symmetrical B) in all directions    Scapulothoraic Rhythm: weakness noted, difficulty elevating arm without UT activation    Palpation: tender L) levator, UT; R>L pec minor (reproduction of pain); mild tenderness at rib attachments to sternum B)    Other: thoracic mobility limited into rotation, extension    Patient is a 33 year old female with cervical, thoracic and right side shoulder complaints.     Patient has the following significant findings with corresponding treatment plan.                Diagnosis 1:  pec strain, neck pain  Pain -  manual therapy, self management, education and home program  Decreased ROM/flexibility - manual therapy and therapeutic exercise  Decreased strength - therapeutic exercise and therapeutic activities  Decreased proprioception - neuro re-education and therapeutic activities  Impaired muscle performance - neuro re-education  Decreased function - therapeutic activities    Therapy Evaluation Codes:   1) History comprised of:   Personal factors that impact the plan of care:      Anxiety.    Comorbidity factors that impact the plan of care are:      Depression.     Medications impacting care: Anti-depressant.  2) Examination of Body Systems comprised of:   Body structures and functions that impact the plan of care:      Cervical spine, Shoulder and Thoracic Spine.   Activity limitations that impact the plan of care are:      Dressing and Lifting.  3) Clinical presentation characteristics are:   Stable/Uncomplicated.  4) Decision-Making    Low complexity using standardized patient assessment instrument and/or measureable assessment of functional outcome.  Cumulative Therapy Evaluation is: Low complexity.    Previous and current functional limitations:  (See Goal Flow Sheet for this information)    Short term and Long term goals: (See Goal Flow Sheet for this information)     Communication ability:  Patient appears to be able to clearly communicate and understand verbal and written communication and follow directions correctly.  Treatment Explanation - The following has been discussed with the patient:   RX ordered/plan of care  Anticipated outcomes  Possible risks and side effects  This patient would benefit from PT intervention to resume normal activities.   Rehab potential is good.    Frequency:  1 X week, once daily  Duration:  for 8 weeks  Discharge Plan:  Achieve all  LTG.  Independent in home treatment program.  Reach maximal therapeutic benefit.    Please refer to the daily flowsheet for treatment today, total treatment time and time spent performing 1:1 timed codes.

## 2019-07-02 PROBLEM — M54.2 NECK PAIN: Status: RESOLVED | Noted: 2018-11-21 | Resolved: 2019-05-31

## 2019-07-02 PROBLEM — S29.011D: Status: RESOLVED | Noted: 2019-05-31 | Resolved: 2019-05-31

## 2019-09-04 PROBLEM — M79.18 MYOFASCIAL MUSCLE PAIN: Status: RESOLVED | Noted: 2018-11-21 | Resolved: 2019-09-04

## 2019-09-04 NOTE — PROGRESS NOTES
Discharge Note    Progress reporting period is from initial evaluation date (please see noted date below) to Jan 9, 2019.  Linked Episodes   Type: Episode: Status: Noted: Resolved: Last update: Updated by:   PHYSICAL THERAPY Neck pain 11/21/18 Active 11/21/2018 5/31/2019 11:19 AM Lindsay Guardado, PT      Comments:       Alexi failed to follow up and current status is unknown.  Please see information below for last relevant information on current status.  Patient seen for 3 visits.    SUBJECTIVE  Subjective changes noted by patient:  Pt's pain is better since last session, however, continues to have a trigger point in left upper back. Continues to perform exercises routinely  .  Current pain level is 2/10.     Previous pain level was  2/10.   Changes in function:  Yes (See Goal flowsheet attached for changes in current functional level)  Adverse reaction to treatment or activity: None    OBJECTIVE  Changes noted in objective findings: MMT: MT 4/5 B, LT 4/5 B; Moderate pain and hypomobility T1-T4 with spring testing     ASSESSMENT/PLAN  Diagnosis: Neck pain   Updated problem list and treatment plan:   Pain - HEP  Decreased ROM/flexibility - HEP  Decreased function - HEP  Decreased strength - HEP  STG/LTGs have been met or progress has been made towards goals:  Yes, please see goal flowsheet for most current information  Assessment of Progress: current status is unknown.    Last current status: Pt is progressing as expected   Self Management Plans:  HEP  I have re-evaluated this patient and find that the nature, scope, duration and intensity of the therapy is appropriate for the medical condition of the patient.  Alexi continues to require the following intervention to meet STG and LTG's:  HEP.    Recommendations:  Discharge with current home program.  Patient to follow up with MD as needed.    Please refer to the daily flowsheet for treatment today, total treatment time and time spent performing 1:1 timed  codes.

## 2019-11-29 ENCOUNTER — OFFICE VISIT (OUTPATIENT)
Dept: URGENT CARE | Facility: URGENT CARE | Age: 34
End: 2019-11-29
Payer: COMMERCIAL

## 2019-11-29 VITALS
BODY MASS INDEX: 31.94 KG/M2 | HEART RATE: 81 BPM | TEMPERATURE: 98.6 F | DIASTOLIC BLOOD PRESSURE: 76 MMHG | SYSTOLIC BLOOD PRESSURE: 114 MMHG | OXYGEN SATURATION: 99 % | WEIGHT: 180.3 LBS

## 2019-11-29 DIAGNOSIS — R05.9 COUGH: ICD-10-CM

## 2019-11-29 DIAGNOSIS — J02.9 SORE THROAT: Primary | ICD-10-CM

## 2019-11-29 LAB
BASOPHILS # BLD AUTO: 0 10E9/L (ref 0–0.2)
BASOPHILS NFR BLD AUTO: 0.4 %
DEPRECATED S PYO AG THROAT QL EIA: NORMAL
DIFFERENTIAL METHOD BLD: NORMAL
EOSINOPHIL # BLD AUTO: 0.3 10E9/L (ref 0–0.7)
EOSINOPHIL NFR BLD AUTO: 3.1 %
LYMPHOCYTES # BLD AUTO: 2.6 10E9/L (ref 0.8–5.3)
LYMPHOCYTES NFR BLD AUTO: 31 %
MONOCYTES # BLD AUTO: 0.7 10E9/L (ref 0–1.3)
MONOCYTES NFR BLD AUTO: 8.7 %
NEUTROPHILS # BLD AUTO: 4.8 10E9/L (ref 1.6–8.3)
NEUTROPHILS NFR BLD AUTO: 56.8 %
SPECIMEN SOURCE: NORMAL
WBC # BLD AUTO: 8.4 10E9/L (ref 4–11)

## 2019-11-29 PROCEDURE — 87880 STREP A ASSAY W/OPTIC: CPT | Performed by: NURSE PRACTITIONER

## 2019-11-29 PROCEDURE — 36415 COLL VENOUS BLD VENIPUNCTURE: CPT | Performed by: NURSE PRACTITIONER

## 2019-11-29 PROCEDURE — 85048 AUTOMATED LEUKOCYTE COUNT: CPT | Performed by: NURSE PRACTITIONER

## 2019-11-29 PROCEDURE — 99213 OFFICE O/P EST LOW 20 MIN: CPT | Performed by: NURSE PRACTITIONER

## 2019-11-29 PROCEDURE — 87081 CULTURE SCREEN ONLY: CPT | Performed by: NURSE PRACTITIONER

## 2019-11-29 PROCEDURE — 85004 AUTOMATED DIFF WBC COUNT: CPT | Performed by: NURSE PRACTITIONER

## 2019-11-29 ASSESSMENT — ENCOUNTER SYMPTOMS
COUGH: 1
WHEEZING: 0
NAUSEA: 0
SORE THROAT: 1
FEVER: 1
DIZZINESS: 0
HEADACHES: 0
SHORTNESS OF BREATH: 0
RHINORRHEA: 1
ABDOMINAL PAIN: 0
DIARRHEA: 0
NECK PAIN: 0
MYALGIAS: 0
VOMITING: 0
LIGHT-HEADEDNESS: 0

## 2019-11-30 LAB
BACTERIA SPEC CULT: NORMAL
SPECIMEN SOURCE: NORMAL

## 2019-11-30 NOTE — PROGRESS NOTES
SUBJECTIVE:   Alexi Montemayor is a 34 year old female presenting with a chief complaint of   Chief Complaint   Patient presents with     Urgent Care     URI     chest congestion, green mucus, sore throat x 10 days tx: losages, tylenol       She is an established patient of [facility].    URI Adult    Onset of symptoms was 10 day(s) ago.  Course of illness is worsening.    Severity moderate  Current and Associated symptoms: runny nose, stuffy nose, cough - non-productive, ear pain right and sore throat  Treatment measures tried include Tylenol/Ibuprofen and lozzenges.  Predisposing factors include ill contact: Work.        Review of Systems   Constitutional: Positive for fever (low grade).   HENT: Positive for congestion, ear pain (right), rhinorrhea and sore throat.    Respiratory: Positive for cough. Negative for shortness of breath and wheezing.    Gastrointestinal: Negative for abdominal pain, diarrhea, nausea and vomiting.   Musculoskeletal: Negative for myalgias and neck pain.   Skin: Negative for rash.   Neurological: Negative for dizziness, light-headedness and headaches.       Past Medical History:   Diagnosis Date     Anxiety      Depression      Family History   Problem Relation Age of Onset     Cancer Mother      Diabetes Father      Hypertension Father      Glaucoma No family hx of      Macular Degeneration No family hx of      Current Outpatient Medications   Medication Sig Dispense Refill     citalopram (CELEXA) 20 MG tablet   1     hydrOXYzine (ATARAX) 25 MG tablet        Social History     Tobacco Use     Smoking status: Never Smoker     Smokeless tobacco: Never Used   Substance Use Topics     Alcohol use: Yes     Comment: Rarely       OBJECTIVE  /76   Pulse 81   Temp 98.6  F (37  C) (Tympanic)   Wt 81.8 kg (180 lb 4.8 oz)   SpO2 99%   BMI 31.94 kg/m      Physical Exam  Vitals signs and nursing note reviewed.   Constitutional:       Appearance: Normal appearance. She is well-developed.    HENT:      Head: Normocephalic and atraumatic.      Right Ear: Tympanic membrane, ear canal and external ear normal.      Left Ear: Tympanic membrane, ear canal and external ear normal.      Nose: Congestion and rhinorrhea present.      Right Sinus: No maxillary sinus tenderness or frontal sinus tenderness.      Left Sinus: No maxillary sinus tenderness or frontal sinus tenderness.      Mouth/Throat:      Pharynx: Posterior oropharyngeal erythema present. No oropharyngeal exudate.   Eyes:      Extraocular Movements: Extraocular movements intact.      Conjunctiva/sclera: Conjunctivae normal.      Pupils: Pupils are equal, round, and reactive to light.   Neck:      Musculoskeletal: Normal range of motion and neck supple.   Cardiovascular:      Rate and Rhythm: Normal rate and regular rhythm.      Heart sounds: Normal heart sounds.   Pulmonary:      Effort: Pulmonary effort is normal.      Breath sounds: Normal breath sounds and air entry.   Lymphadenopathy:      Head:      Right side of head: Submandibular and tonsillar adenopathy present.      Left side of head: Submandibular and tonsillar adenopathy present.      Cervical: Cervical adenopathy present.   Skin:     General: Skin is warm and dry.   Neurological:      Mental Status: She is alert and oriented to person, place, and time.   Psychiatric:         Behavior: Behavior is cooperative.         Labs:  Results for orders placed or performed in visit on 11/29/19 (from the past 24 hour(s))   Rapid strep screen   Result Value Ref Range    Specimen Description Throat     Rapid Strep A Screen       NEGATIVE: No Group A streptococcal antigen detected by immunoassay, await culture report.   WBC with Diff   Result Value Ref Range    WBC 8.4 4.0 - 11.0 10e9/L    Diff Method Automated Method     % Neutrophils 56.8 %    % Lymphocytes 31.0 %    % Monocytes 8.7 %    % Eosinophils 3.1 %    % Basophils 0.4 %    Absolute Neutrophil 4.8 1.6 - 8.3 10e9/L    Absolute Lymphocytes  2.6 0.8 - 5.3 10e9/L    Absolute Monocytes 0.7 0.0 - 1.3 10e9/L    Absolute Eosinophils 0.3 0.0 - 0.7 10e9/L    Absolute Basophils 0.0 0.0 - 0.2 10e9/L       X-Ray was not done.    ASSESSMENT:      ICD-10-CM    1. Sore throat J02.9 Rapid strep screen     Beta strep group A culture     WBC with Diff   2. Cough R05 WBC with Diff        Medical Decision Making:    Differential Diagnosis:  URI Adult/Peds:  Acute right otitis media, Bronchitis-viral, Pneumonia, Sinusitis, Strep pharyngitis, Viral pharyngitis and Viral upper respiratory illness    Serious Comorbid Conditions:  Adult:  None    PLAN:  Discussed with patient that rapid strep was negative. Wbc was also normal. Favor a viral illness. Symptomatic treatment recommendations discussed. Education was added to AVS. Patient was agreeable to plan and verbalized understanding.     Followup:    If not improving in 3-5 days or if condition worsens, follow up with your Primary Care Provider    Patient Instructions   Push Fluids  Plenty of rest  Tylenol and ibuprofen to help with pain or fever  Humidified air can help with congestion  Nasal saline or Flonase nasal spray to help with congestion  Salt water gargles, anesthetic throat spray, or lozenges to help with sore throat.

## 2020-03-05 NOTE — ED PROVIDER NOTES
Chief Complaint : 6 week PSA check     Records/Orders: PSA before appointment     Pt Contacted: no    At Rooming: normal    Emergency Department Attending Supervision Note  10/12/2018  10:02 AM      I evaluated this patient in conjunction with Romie Mcadams PA-C.    The patient stated that she suddenly developed as stabbing lower sternal chest pain when she bent over at [employer].  She spontaneously offers that this was rapidly accompanied by anxiety and she thinks a panic attack that made her symptoms worse.  Evaluation here is completely unremarkable.  EKG is nondiagnostic and troponin is negative.  While this does not completely rule out coronary disease I think her presentation is extremely low risk and she is a low risk patient.  She is also low risk for pulmonary embolus but with significant tachycardia on arrival could not be ruled out by PERC criteria.  D-dimer is normal.  She has not had shortness of breath and oxygen sat is 100%.  I do not think further testing for a pulmonary etiology is required.  Discussed with her that I suspect something else started this, further musculoskeletal chest pain, GE reflux, or other diagnosis but then was complicated by anxiety.  Patient indicates that she previously had some visits with the therapist regarding anxiety but did not particularly find it helpful.  She is not interested in being on medication.  We discussed behavioral measures such as exercise and meditation that may be helpful.  I will have her check her insurance papers or call her insurance company and get scheduled for a therapist who specializes in techniques to manage anxiety.        Diagnosis    ICD-10-CM    1. Acute chest pain R07.9    2. Anxiety F41.9          Ambar Shelton MD Van Pelt, Susan Gail, MD  10/12/18 1006

## 2020-03-10 ENCOUNTER — HEALTH MAINTENANCE LETTER (OUTPATIENT)
Age: 35
End: 2020-03-10

## 2020-06-29 ENCOUNTER — RESULTS ONLY (OUTPATIENT)
Dept: LAB | Age: 35
End: 2020-06-29

## 2020-06-29 ENCOUNTER — APPOINTMENT (OUTPATIENT)
Dept: LAB | Facility: CLINIC | Age: 35
End: 2020-06-29
Payer: COMMERCIAL

## 2020-07-01 LAB
COVID-19 SPIKE RBD ABY TITER: NORMAL
COVID-19 SPIKE RBD ABY: POSITIVE

## 2020-07-03 ENCOUNTER — NURSE TRIAGE (OUTPATIENT)
Dept: NURSING | Facility: CLINIC | Age: 35
End: 2020-07-03

## 2020-12-14 NOTE — PROGRESS NOTES
SUBJECTIVE:   CC: Alexi Montemayor is an 35 year old woman who presents for preventive health visit.     Patient has been advised of split billing requirements and indicates understanding: Yes  Healthy Habits:     Getting at least 3 servings of Calcium per day:  Yes    Bi-annual eye exam:  Yes    Dental care twice a year:  Yes    Sleep apnea or symptoms of sleep apnea:  None    Diet:  Regular (no restrictions)    Frequency of exercise:  1 day/week    Duration of exercise:  Less than 15 minutes    Taking medications regularly:  Yes    Medication side effects:  Other    PHQ-2 Total Score: 2    Additional concerns today:  Yes    Hx MDD on citalopram   Follows with psychiatry  She is gaining weight  Not exercising    Today's PHQ-2 Score:   PHQ-2 ( 1999 Pfizer) 12/15/2020   Q1: Little interest or pleasure in doing things 1   Q2: Feeling down, depressed or hopeless 1   PHQ-2 Score 2   Q1: Little interest or pleasure in doing things Several days   Q2: Feeling down, depressed or hopeless Several days   PHQ-2 Score 2     Abuse: Current or Past (Physical, Sexual or Emotional) - No  Do you feel safe in your environment? Yes    Social History     Tobacco Use     Smoking status: Never Smoker     Smokeless tobacco: Never Used   Substance Use Topics     Alcohol use: Yes     Comment: Rarely     Alcohol Use 12/15/2020   Prescreen: >3 drinks/day or >7 drinks/week? No     Reviewed orders with patient.  Reviewed health maintenance and updated orders accordingly - Yes  Lab work is in process  Labs reviewed in EPIC  BP Readings from Last 3 Encounters:   12/15/20 126/74   11/29/19 114/76   05/20/19 120/76    Wt Readings from Last 3 Encounters:   12/15/20 91.4 kg (201 lb 6.4 oz)   11/29/19 81.8 kg (180 lb 4.8 oz)   05/20/19 78 kg (172 lb)                  Patient Active Problem List   Diagnosis     Anxiety     Major depressive disorder, recurrent episode, moderate with anxious distress (H)     Panic disorder without agoraphobia     Past  Surgical History:   Procedure Laterality Date     Skin mole removal         Social History     Tobacco Use     Smoking status: Never Smoker     Smokeless tobacco: Never Used   Substance Use Topics     Alcohol use: Yes     Comment: Rarely     Family History   Problem Relation Age of Onset     Cancer Mother      Diabetes Father      Hypertension Father      Glaucoma No family hx of      Macular Degeneration No family hx of          Current Outpatient Medications   Medication Sig Dispense Refill     citalopram (CELEXA) 20 MG tablet Take 2 tablets (40 mg) by mouth daily  1     hydrOXYzine (ATARAX) 25 MG tablet          Mammogram not appropriate for this patient based on age.    Pertinent mammograms are reviewed under the imaging tab.  History of abnormal Pap smear: NO - age 30-65 PAP every 5 years with negative HPV co-testing recommended     Reviewed and updated as needed this visit by clinical staff                 Reviewed and updated as needed this visit by Provider                Past Medical History:   Diagnosis Date     Anxiety      Depression         Review of Systems   Constitutional: Negative for chills and fever.   HENT: Negative for congestion, ear pain, hearing loss and sore throat.    Eyes: Negative for pain and visual disturbance.   Respiratory: Negative for cough.    Cardiovascular: Negative for chest pain, palpitations and peripheral edema.   Gastrointestinal: Negative for abdominal pain, constipation, diarrhea, heartburn, hematochezia and nausea.   Genitourinary: Negative for dysuria, frequency, genital sores, hematuria, pelvic pain, urgency and vaginal bleeding.   Musculoskeletal: Positive for myalgias. Negative for arthralgias and joint swelling.   Skin: Negative for rash.   Neurological: Positive for headaches. Negative for dizziness and paresthesias.   Psychiatric/Behavioral: Positive for mood changes. The patient is not nervous/anxious.           OBJECTIVE:   /74 (BP Location: Right arm,  "Patient Position: Sitting, Cuff Size: Adult Large)   Pulse 84   Temp 98.2  F (36.8  C) (Tympanic)   Ht 1.608 m (5' 3.31\")   Wt 91.4 kg (201 lb 6.4 oz)   SpO2 99%   BMI 35.33 kg/m    Physical Exam  GENERAL: healthy, alert and no distress  EYES: Eyes grossly normal to inspection, PERRL and conjunctivae and sclerae normal  HENT: ear canals and TM's normal, nose and mouth without ulcers or lesions  NECK: No masses, or scars and thyroid normal to palpation  RESP: lungs clear to auscultation - no rales, rhonchi or wheezes  CV: regular rate and rhythm, normal S1 S2, no S3 or S4, no murmur, click or rub, no peripheral edema and peripheral pulses strong  ABDOMEN: soft, nontender, no hepatosplenomegaly, no masses and bowel sounds normal   (female): normal female external genitalia, normal urethral meatus, vaginal mucosa pink, moist, well rugated, and normal cervix/adnexa/uterus without masses or discharge  MS: no gross musculoskeletal defects noted, no edema  SKIN: no suspicious lesions or rashes  NEURO: Normal strength and tone, mentation intact and speech normal  PSYCH: mentation appears normal, affect normal/bright    Diagnostic Test Results:  Labs reviewed in Epic  Results for orders placed or performed in visit on 12/15/20   TSH with free T4 reflex     Status: None   Result Value Ref Range    TSH 1.28 0.40 - 4.00 mU/L   Hemoglobin A1c     Status: None   Result Value Ref Range    Hemoglobin A1C 5.3 0 - 5.6 %       ASSESSMENT/PLAN:       ICD-10-CM    1. Encounter for preventative adult health care examination  Z00.00    2. Screening for malignant neoplasm of cervix  Z12.4 Pap imaged thin layer screen with HPV - recommended age 30 - 65 years (select HPV order below)     HPV High Risk Types DNA Cervical   3. Major depressive disorder, recurrent episode, moderate with anxious distress (H)  F33.1    4. Weight gain  R63.5 TSH with free T4 reflex     Hemoglobin A1c       Patient has been advised of split billing " "requirements and indicates understanding: Yes  COUNSELING:  Reviewed preventive health counseling, as reflected in patient instructions       Regular exercise       Healthy diet/nutrition    Estimated body mass index is 31.94 kg/m  as calculated from the following:    Height as of 5/20/19: 1.6 m (5' 3\").    Weight as of 11/29/19: 81.8 kg (180 lb 4.8 oz).        She reports that she has never smoked. She has never used smokeless tobacco.    Counseling Resources:  ATP IV Guidelines  Pooled Cohorts Equation Calculator  Breast Cancer Risk Calculator  BRCA-Related Cancer Risk Assessment: FHS-7 Tool  FRAX Risk Assessment  ICSI Preventive Guidelines  Dietary Guidelines for Americans, 2010  USDA's MyPlate  ASA Prophylaxis  Lung CA Screening    [facility] TASIA    Answers for HPI/ROS submitted by the patient on 12/15/2020   Annual Exam:  PHQ9 TOTAL SCORE: 3  AYDIN 7 TOTAL SCORE: 0    "

## 2020-12-15 ENCOUNTER — OFFICE VISIT (OUTPATIENT)
Dept: PEDIATRICS | Facility: CLINIC | Age: 35
End: 2020-12-15
Payer: COMMERCIAL

## 2020-12-15 VITALS
HEART RATE: 84 BPM | HEIGHT: 63 IN | SYSTOLIC BLOOD PRESSURE: 126 MMHG | DIASTOLIC BLOOD PRESSURE: 74 MMHG | TEMPERATURE: 98.2 F | OXYGEN SATURATION: 99 % | WEIGHT: 201.4 LBS | BODY MASS INDEX: 35.68 KG/M2

## 2020-12-15 DIAGNOSIS — R63.5 WEIGHT GAIN: ICD-10-CM

## 2020-12-15 DIAGNOSIS — Z00.00 ENCOUNTER FOR PREVENTATIVE ADULT HEALTH CARE EXAMINATION: Primary | ICD-10-CM

## 2020-12-15 DIAGNOSIS — Z12.4 SCREENING FOR MALIGNANT NEOPLASM OF CERVIX: ICD-10-CM

## 2020-12-15 DIAGNOSIS — F33.1 MAJOR DEPRESSIVE DISORDER, RECURRENT EPISODE, MODERATE WITH ANXIOUS DISTRESS (H): ICD-10-CM

## 2020-12-15 PROBLEM — G71.02 FSH (FACIOSCAPULOHUMERAL MUSCULAR DYSTROPHY) (H): Status: RESOLVED | Noted: 2019-02-21 | Resolved: 2020-12-15

## 2020-12-15 LAB — HBA1C MFR BLD: 5.3 % (ref 0–5.6)

## 2020-12-15 PROCEDURE — 83036 HEMOGLOBIN GLYCOSYLATED A1C: CPT | Performed by: NURSE PRACTITIONER

## 2020-12-15 PROCEDURE — 87624 HPV HI-RISK TYP POOLED RSLT: CPT | Performed by: NURSE PRACTITIONER

## 2020-12-15 PROCEDURE — 36415 COLL VENOUS BLD VENIPUNCTURE: CPT | Performed by: NURSE PRACTITIONER

## 2020-12-15 PROCEDURE — 84443 ASSAY THYROID STIM HORMONE: CPT | Performed by: NURSE PRACTITIONER

## 2020-12-15 PROCEDURE — G0145 SCR C/V CYTO,THINLAYER,RESCR: HCPCS | Performed by: NURSE PRACTITIONER

## 2020-12-15 PROCEDURE — 99395 PREV VISIT EST AGE 18-39: CPT | Performed by: NURSE PRACTITIONER

## 2020-12-15 RX ORDER — CITALOPRAM HYDROBROMIDE 20 MG/1
40 TABLET ORAL DAILY
Refills: 1 | COMMUNITY
Start: 2020-12-15

## 2020-12-15 ASSESSMENT — PATIENT HEALTH QUESTIONNAIRE - PHQ9
SUM OF ALL RESPONSES TO PHQ QUESTIONS 1-9: 3
SUM OF ALL RESPONSES TO PHQ QUESTIONS 1-9: 3

## 2020-12-15 ASSESSMENT — ANXIETY QUESTIONNAIRES
3. WORRYING TOO MUCH ABOUT DIFFERENT THINGS: NOT AT ALL
1. FEELING NERVOUS, ANXIOUS, OR ON EDGE: NOT AT ALL
GAD7 TOTAL SCORE: 0
GAD7 TOTAL SCORE: 0
7. FEELING AFRAID AS IF SOMETHING AWFUL MIGHT HAPPEN: NOT AT ALL
2. NOT BEING ABLE TO STOP OR CONTROL WORRYING: NOT AT ALL
GAD7 TOTAL SCORE: 0
5. BEING SO RESTLESS THAT IT IS HARD TO SIT STILL: NOT AT ALL
6. BECOMING EASILY ANNOYED OR IRRITABLE: NOT AT ALL
4. TROUBLE RELAXING: NOT AT ALL
7. FEELING AFRAID AS IF SOMETHING AWFUL MIGHT HAPPEN: NOT AT ALL

## 2020-12-15 ASSESSMENT — ENCOUNTER SYMPTOMS
CONSTIPATION: 0
SORE THROAT: 0
COUGH: 0
DIARRHEA: 0
HEMATURIA: 0
FREQUENCY: 0
JOINT SWELLING: 0
HEADACHES: 1
HEMATOCHEZIA: 0
CHILLS: 0
NAUSEA: 0
PARESTHESIAS: 0
ABDOMINAL PAIN: 0
MYALGIAS: 1
ARTHRALGIAS: 0
PALPITATIONS: 0
HEARTBURN: 0
FEVER: 0
DIZZINESS: 0
EYE PAIN: 0
NERVOUS/ANXIOUS: 0
DYSURIA: 0

## 2020-12-15 ASSESSMENT — MIFFLIN-ST. JEOR: SCORE: 1582.54

## 2020-12-15 NOTE — LETTER
My Depression Action Plan  Name: Alexi Montemayor   Date of Birth 1985  Date: 12/15/2020    My doctor: Judie Stern   My clinic: [facility]  [address]  [address]  [place] [address] [phone]          GREEN    ZONE   Good Control    What it looks like:     Things are going generally well. You have normal ups and downs. You may even feel depressed from time to time, but bad moods usually last less than a day.   What you need to do:  1. Continue to care for yourself (see self care plan)  2. Check your depression survival kit and update it as needed  3. Follow your physician s recommendations including any medication.  4. Do not stop taking medication unless you consult with your physician first.           YELLOW         ZONE Getting Worse    What it looks like:     Depression is starting to interfere with your life.     It may be hard to get out of bed; you may be starting to isolate yourself from others.    Symptoms of depression are starting to last most all day and this has happened for several days.     You may have suicidal thoughts but they are not constant.   What you need to do:     1. Call your care team. Your response to treatment will improve if you keep your care team informed of your progress. Yellow periods are signs an adjustment may need to be made.     2. Continue your self-care.  Just get dressed and ready for the day.  Don't give yourself time to talk yourself out of it.    3. Talk to someone in your support network.    4. Open up your Depression Self-Care Plan/Wellness Kit.           RED    ZONE Medical Alert - Get Help    What it looks like:     Depression is seriously interfering with your life.     You may experience these or other symptoms: You can t get out of bed most days, can t work or engage in other necessary activities, you have trouble taking care of basic hygiene, or basic responsibilities, thoughts of suicide or  death that will not go away, self-injurious behavior.     What you need to do:  1. Call your care team and request a same-day appointment. If they are not available (weekends or after hours) call your local crisis line, emergency room or 911.            Depression Self-Care Plan / Wellness Kit    Self-Care for Depression  Here s the deal. Your body and mind are really not as separate as most people think.  What you do and think affects how you feel and how you feel influences what you do and think. This means if you do things that people who feel good do, it will help you feel better.  Sometimes this is all it takes.  There is also a place for medication and therapy depending on how severe your depression is, so be sure to consult with your medical provider and/ or Behavioral Health Consultant if your symptoms are worsening or not improving.     In order to better manage my stress, I will:    Exercise  Get some form of exercise, every day. This will help reduce pain and release endorphins, the  feel good  chemicals in your brain. This is almost as good as taking antidepressants!  This is not the same as joining a gym and then never going! (they count on that by the way ) It can be as simple as just going for a walk or doing some gardening, anything that will get you moving.      Hygiene   Maintain good hygiene (get out of bed in the morning, make your bed, brush your teeth, take a shower, and get dressed like you were going to work, even if you are unemployed).  If your clothes don't fit try to get ones that do.    Diet  Strive to eat foods that are good for me, drink plenty of water, and avoid excessive sugar, caffeine, alcohol, and other mood-altering substances.  Some foods that are helpful in depression are: complex carbohydrates, B vitamins, flaxseed, fish or fish oil, fresh fruits and vegetables.    Psychotherapy  Agree to participate in Individual Therapy (if recommended).    Medication  If prescribed  medications, I agree to take them.  Missing doses can result in serious side effects.  I understand that drinking alcohol, or other illicit drug use, may cause potential side effects.  I will not stop my medication abruptly without first discussing it with my provider.    Staying Connected With Others  Stay in touch with my friends, family members, and my primary care provider/team.    Use your imagination  Be creative.  We all have a creative side; it doesn t matter if it s oil painting, sand castles, or mud pies! This will also kick up the endorphins.    Witness Beauty  (AKA stop and smell the roses) Take a look outside, even in mid-winter. Notice colors, textures. Watch the squirrels and birds.     Service to others  Be of service to others.  There is always someone else in need.  By helping others we can  get out of ourselves  and remember the really important things.  This also provides opportunities for practicing all the other parts of the program.    Humor  Laugh and be silly!  Adjust your TV habits for less news and crime-drama and more comedy.    Control your stress  Try breathing deep, massage therapy, biofeedback, and meditation. Find time to relax each day.     Crisis Text Line  http://www.crisistextline.org    The Crisis Text Line serves anyone, in any type of crisis, providing access to free, 24/7 support and information via the medium people already use and trust:    Here's how it works:  1.  Text 971-580 from anywhere in the [place], anytime, about any type of crisis.  2.  A live, trained Crisis Counselor receives the text and responds quickly.  3.  The volunteer Crisis Counselor will help you move from a 'hot moment to a cool moment'.    My support system    Clinic Contact:  Phone number:    Contact 1:  Phone number:    Contact 2:  Phone number:    Jew/:  Phone number:    Therapist:  Phone number:    Local crisis center:    Phone number:    Other community support:  Phone number:

## 2020-12-16 LAB — TSH SERPL DL<=0.005 MIU/L-ACNC: 1.28 MU/L (ref 0.4–4)

## 2020-12-16 ASSESSMENT — PATIENT HEALTH QUESTIONNAIRE - PHQ9: SUM OF ALL RESPONSES TO PHQ QUESTIONS 1-9: 3

## 2020-12-16 ASSESSMENT — ANXIETY QUESTIONNAIRES: GAD7 TOTAL SCORE: 0

## 2020-12-21 LAB
COPATH REPORT: NORMAL
PAP: NORMAL

## 2020-12-22 LAB
FINAL DIAGNOSIS: NORMAL
HPV HR 12 DNA CVX QL NAA+PROBE: NEGATIVE
HPV16 DNA SPEC QL NAA+PROBE: NEGATIVE
HPV18 DNA SPEC QL NAA+PROBE: NEGATIVE
SPECIMEN DESCRIPTION: NORMAL
SPECIMEN SOURCE CVX/VAG CYTO: NORMAL

## 2021-02-16 ENCOUNTER — E-VISIT (OUTPATIENT)
Dept: PEDIATRICS | Facility: CLINIC | Age: 36
End: 2021-02-16
Payer: COMMERCIAL

## 2021-02-16 DIAGNOSIS — B96.89 BACTERIAL VAGINOSIS: Primary | ICD-10-CM

## 2021-02-16 DIAGNOSIS — N76.0 BACTERIAL VAGINOSIS: Primary | ICD-10-CM

## 2021-02-16 PROCEDURE — 99421 OL DIG E/M SVC 5-10 MIN: CPT | Performed by: NURSE PRACTITIONER

## 2021-02-16 RX ORDER — METRONIDAZOLE 7.5 MG/G
1 GEL VAGINAL DAILY
Qty: 25 G | Refills: 0 | Status: SHIPPED | OUTPATIENT
Start: 2021-02-16 | End: 2021-02-21

## 2021-02-25 ENCOUNTER — TELEPHONE (OUTPATIENT)
Dept: OBGYN | Facility: CLINIC | Age: 36
End: 2021-02-25

## 2021-02-25 NOTE — TELEPHONE ENCOUNTER
Sandie Gleason NP in psychiatrics from [facility], calls to give some hx on pt seeing you for NPN 3/26      Pt taking 40mg citalopram for depression, AYDIN and PTSD. Sx did not respond to lower doses.     Pt had severe anxiety and is doing well on med.  Pt is paranoid about taking the citalopram and this NP just wanted to see if you are okay with her staying on it.      Records should be getting sent.  Release has been faxed to our fd.      Sandie's phone is  ext [phone] ok to .      Gisselle WORRELL R.N.

## 2021-03-01 NOTE — TELEPHONE ENCOUNTER
Sounds like she needs the med.  It's a risk/benefit ratio of course, but it sounds to me like she should stay on it.

## 2021-03-09 ENCOUNTER — PRENATAL OFFICE VISIT (OUTPATIENT)
Dept: NURSING | Facility: CLINIC | Age: 36
End: 2021-03-09
Payer: COMMERCIAL

## 2021-03-09 DIAGNOSIS — O09.521 HIGH RISK PREGNANCY, MULTIGRAVIDA OF ADVANCED MATERNAL AGE IN FIRST TRIMESTER: Primary | ICD-10-CM

## 2021-03-09 PROCEDURE — 99207 PR NO CHARGE NURSE ONLY: CPT

## 2021-03-09 RX ORDER — PRENATAL VIT/IRON FUM/FOLIC AC 27MG-0.8MG
1 TABLET ORAL DAILY
Qty: 90 TABLET | Refills: 3
Start: 2021-03-09 | End: 2023-08-18

## 2021-03-09 SDOH — ECONOMIC STABILITY: FOOD INSECURITY: WITHIN THE PAST 12 MONTHS, THE FOOD YOU BOUGHT JUST DIDN'T LAST AND YOU DIDN'T HAVE MONEY TO GET MORE.: NEVER TRUE

## 2021-03-09 SDOH — ECONOMIC STABILITY: TRANSPORTATION INSECURITY
IN THE PAST 12 MONTHS, HAS LACK OF TRANSPORTATION KEPT YOU FROM MEETINGS, WORK, OR FROM GETTING THINGS NEEDED FOR DAILY LIVING?: NO

## 2021-03-09 SDOH — ECONOMIC STABILITY: FOOD INSECURITY: WITHIN THE PAST 12 MONTHS, YOU WORRIED THAT YOUR FOOD WOULD RUN OUT BEFORE YOU GOT MONEY TO BUY MORE.: NEVER TRUE

## 2021-03-09 SDOH — HEALTH STABILITY: MENTAL HEALTH: HOW OFTEN DO YOU HAVE A DRINK CONTAINING ALCOHOL?: NOT ASKED

## 2021-03-09 SDOH — ECONOMIC STABILITY: INCOME INSECURITY: HOW HARD IS IT FOR YOU TO PAY FOR THE VERY BASICS LIKE FOOD, HOUSING, MEDICAL CARE, AND HEATING?: NOT HARD AT ALL

## 2021-03-09 SDOH — HEALTH STABILITY: MENTAL HEALTH: HOW OFTEN DO YOU HAVE 6 OR MORE DRINKS ON ONE OCCASION?: NOT ASKED

## 2021-03-09 SDOH — ECONOMIC STABILITY: TRANSPORTATION INSECURITY
IN THE PAST 12 MONTHS, HAS THE LACK OF TRANSPORTATION KEPT YOU FROM MEDICAL APPOINTMENTS OR FROM GETTING MEDICATIONS?: NO

## 2021-03-09 SDOH — HEALTH STABILITY: MENTAL HEALTH: HOW MANY STANDARD DRINKS CONTAINING ALCOHOL DO YOU HAVE ON A TYPICAL DAY?: NOT ASKED

## 2021-03-09 NOTE — PROGRESS NOTES
"Chief Complaint   Patient presents with     Prenatal Care     New Prenatal Nurse Telephone Visit     8w5d  Estimated Date of Delivery: Oct 14, 2021      Initial LMP 2021  Estimated body mass index is 35.33 kg/m  as calculated from the following:    Height as of 12/15/20: 1.608 m (5' 3.31\").    Weight as of 12/15/20: 91.4 kg (201 lb 6.4 oz).  BP completed using cuff size: NA (Not Taken)    Questioned patient about current smoking habits.  Pt. has never smoked.      NPN nurse visit done over the phone. Pt will be given NPN folder and book at her upcoming appt. Information in folder reviewed.  Discussed optional screening available to assess chromosomal anomalies. Questions answered. Pt advised to call the clinic if she has any questions or concerns related to her pregnancy. Prenatal labs will be obtained at her upcoming appt. New prenatal visit scheduled on 3/26/21 with   Dr Valerie Sanders.        Lab Results   Component Value Date    PAP NIL 12/15/2020           Patient supplied answers from flow sheet for:  Prenatal OB Questionnaire.  Past Medical History  Diabetes?: No  Hypertension : No  Heart disease, mitral valve prolapse or rheumatic fever?: No  An autoimmune disease such as lupus or rheumatoid arthritis?: No  Kidney disease or urinary tract infection?: No  Epilepsy, seizures or spells?: No  Migraine headaches?: No  A stroke or loss of function or sensation?: No  Any other neurological problems?: No  Have you ever been treated for depression?: (!) Yes  Are you having problems with crying spells or loss of self-esteem?: No  Have you ever required psychiatric care?: (!) Yes  Have you ever had hepatitis, liver disease or jaundice?: No  Have you been treated for blood clots in your veins, deep vein thromosis, inflammation in the veins, thrombosis, phlebitis, pulmonary embolism or varicosities?: No  Have you had excessive bleeding after surgery or dental work?: No  Do you bleed more than other women after " a cut or scratch?: No  Do you have a history of anemia?: No  Have you ever had thyroid problems or taken thyroid medication?: No   Do you have any endocrine problems?: No  Have you ever been in a major accident or suffered serious trauma?: No  Within the last year, has anyone hit, slapped, kicked or otherwise hurt you?: No  In the last year, has anyone forced you to have sex when you didn't want to?: No    Past Medical History 2   Have you ever received a blood transfusion?: No  Would you refuse a blood transfusion if a doctor judged it to be medically necessary?: No   If you answered Yes, would you rather die than receive a blood transfusion?: No  If you answered Yes, is this for Taoist reasons?: No  Does anyone in your home smoke?: No  Do you use tobacco products?: No  Do you drink beer, wine or hard liquor?: No  Do you use any of the following: marijuana, speed, cocaine, heroin, hallucinogens or other drugs?: No   Is your blood type Rh negative?: No  Have you ever had abnormal antibodies in your blood?: No  Have you ever had asthma?: No  Have you ever had tuberculosis?: No  Do you have any allergies to drugs or over-the-counter medications?: (!) Yes(PCN,Sulfa)  Allergies: Dust Mites, Aspartame, Ethanol, Venlafaxine, Hydrochloride, Sertraline: No  Have you had any breast problems?: No  Have you ever ?: No  Have you had any gynecological surgical procedures such as cervical conization, a LEEP procedure, laser treatment, cryosurgery of the cervix or a dilation and curettage, etc?: No  Have you ever had any other surgical procedures?: No  Have you been hospitalized for a nonsurgical reason excluding normal delivery?: No  Have you ever had any anesthetic complications?: No  Have you ever had an abnormal pap smear?: No    Past Medical History (Continued)  Do you have a history of abnormalities of the uterus?: No  Did your mother take FATOU or any other hormones when she was pregnant with you?: No  Did it take  you more than a year to become pregnant?: No  Have you ever been evaluated or treated for infertility?: No  Is there a history of medical problems in your family, which you feel may be important to this pregnancy?: No  Do you have any other problems we have not asked about which you feel may be important to this pregnancy?: (!) Yes(anxiety)    Symptoms since last menstrual period  Do you have any of the following symptoms: abdominal pain, blood in stools or urine, chest pain, shortness of breath, coughing or vomiting up blood, your heart racing or skipping beats, nausea and vomiting, pain on urination or vaginal discharge or bleed: No  Current medications, including over-the-counter medications, you are using? (If not applicable answer none): citalopram 40mg, prenatal vitamin  Will the patient be 35 years old or older at the time of delivery?: (!) Yes    Has the patient, baby's father or anyone in either family had:  Thalassemia (Italian, Greek, Mediterranean or  background only) and an MCV result less than 80?: No  Neural tube defect such as meningomyelocele, spina bifida or anencephaly?: No  Congenital heart defect?: No  Down's Syndrome?: No  Shemar-Sachs disease (Pentecostal, Cajun, Slovak-Patten)?: No  Sickle cell disease or trait ()?: No  Hemophilia or other inherited problems of blood?: No  Muscular dystrophy?: No  Cystic fibrosis?: No  [place]'s chorea?: No  Mental retardation/autism?: No  If yes, was the person tested for fragile X?: No  Any other inherited genetic or chromosomal disorder?: No  Maternal metabolic disorder (e.g Insulin-dependent diabetes, PKU)?: No  A child with birth defects not listed above?: No  Recurrent pregnancy loss or stillbirth?: No   Has the patient had any medications/street drugs/alcohol since her last menstrual period?: No  Does the patient or baby's father have any other genetic risks?: No    Infection History   Do you object to being tested for Hepatitis B?: No  Do you  object to being tested for HIV?: No   Do you feel that you are at high risk for coming in contact with the AIDS virus?: No  Have you ever been treated for tuberculosis?: No  Have you ever had a positive skin test for tuberculosis?: No  Do you live with someone who has tuberculosis?: No  Have you ever been exposed to tuberculosis?: Unknown  Do you have genital herpes?: No  Does your partner have genital herpes?: No  Have you had a viral illness since your last period?: No  Have you ever had gonorrhea, chlamydia, syphilis, venereal warts, trichomoniasis, pelvic inflammatory disease or any other sexually transmitted disease?: No  Do you know if you are a genital group B streptococcus carrier?: No  Have you had chicken pox/varicella?: (!) Yes   Have you been vaccinated against chicken Pox?: No  Have you had any other infectious diseases?: (!) Yes(tested positive for Covid antibodies)    Amaya Torres RN

## 2021-03-10 ENCOUNTER — ANCILLARY PROCEDURE (OUTPATIENT)
Dept: ULTRASOUND IMAGING | Facility: CLINIC | Age: 36
End: 2021-03-10
Payer: COMMERCIAL

## 2021-03-10 DIAGNOSIS — O09.521 HIGH RISK PREGNANCY, MULTIGRAVIDA OF ADVANCED MATERNAL AGE IN FIRST TRIMESTER: ICD-10-CM

## 2021-03-10 LAB
ABO + RH BLD: NORMAL
ABO + RH BLD: NORMAL
BLD GP AB SCN SERPL QL: NORMAL
BLOOD BANK CMNT PATIENT-IMP: NORMAL
ERYTHROCYTE [DISTWIDTH] IN BLOOD BY AUTOMATED COUNT: 14.5 % (ref 10–15)
HCT VFR BLD AUTO: 37.2 % (ref 35–47)
HGB BLD-MCNC: 11.9 G/DL (ref 11.7–15.7)
MCH RBC QN AUTO: 26.3 PG (ref 26.5–33)
MCHC RBC AUTO-ENTMCNC: 32 G/DL (ref 31.5–36.5)
MCV RBC AUTO: 82 FL (ref 78–100)
PLATELET # BLD AUTO: 263 10E9/L (ref 150–450)
RBC # BLD AUTO: 4.52 10E12/L (ref 3.8–5.2)
SPECIMEN EXP DATE BLD: NORMAL
T PALLIDUM AB SER QL: NONREACTIVE
WBC # BLD AUTO: 7.3 10E9/L (ref 4–11)

## 2021-03-10 PROCEDURE — 85027 COMPLETE CBC AUTOMATED: CPT | Performed by: OBSTETRICS & GYNECOLOGY

## 2021-03-10 PROCEDURE — 87086 URINE CULTURE/COLONY COUNT: CPT | Performed by: OBSTETRICS & GYNECOLOGY

## 2021-03-10 PROCEDURE — 76801 OB US < 14 WKS SINGLE FETUS: CPT | Performed by: OBSTETRICS & GYNECOLOGY

## 2021-03-10 PROCEDURE — 86900 BLOOD TYPING SEROLOGIC ABO: CPT | Performed by: OBSTETRICS & GYNECOLOGY

## 2021-03-10 PROCEDURE — 87340 HEPATITIS B SURFACE AG IA: CPT | Performed by: OBSTETRICS & GYNECOLOGY

## 2021-03-10 PROCEDURE — 99000 SPECIMEN HANDLING OFFICE-LAB: CPT | Performed by: OBSTETRICS & GYNECOLOGY

## 2021-03-10 PROCEDURE — 86850 RBC ANTIBODY SCREEN: CPT | Performed by: OBSTETRICS & GYNECOLOGY

## 2021-03-10 PROCEDURE — 36415 COLL VENOUS BLD VENIPUNCTURE: CPT | Performed by: OBSTETRICS & GYNECOLOGY

## 2021-03-10 PROCEDURE — 86780 TREPONEMA PALLIDUM: CPT | Mod: 90 | Performed by: OBSTETRICS & GYNECOLOGY

## 2021-03-10 PROCEDURE — 86901 BLOOD TYPING SEROLOGIC RH(D): CPT | Performed by: OBSTETRICS & GYNECOLOGY

## 2021-03-10 PROCEDURE — 86762 RUBELLA ANTIBODY: CPT | Performed by: OBSTETRICS & GYNECOLOGY

## 2021-03-10 PROCEDURE — 87389 HIV-1 AG W/HIV-1&-2 AB AG IA: CPT | Performed by: OBSTETRICS & GYNECOLOGY

## 2021-03-11 LAB
BACTERIA SPEC CULT: NO GROWTH
HBV SURFACE AG SERPL QL IA: NONREACTIVE
HIV 1+2 AB+HIV1 P24 AG SERPL QL IA: NONREACTIVE
Lab: NORMAL
RUBV IGG SERPL IA-ACNC: 24 IU/ML
SPECIMEN SOURCE: NORMAL

## 2021-03-26 ENCOUNTER — PRENATAL OFFICE VISIT (OUTPATIENT)
Dept: OBGYN | Facility: CLINIC | Age: 36
End: 2021-03-26
Payer: COMMERCIAL

## 2021-03-26 VITALS — WEIGHT: 204 LBS | BODY MASS INDEX: 35.79 KG/M2 | DIASTOLIC BLOOD PRESSURE: 68 MMHG | SYSTOLIC BLOOD PRESSURE: 116 MMHG

## 2021-03-26 DIAGNOSIS — O09.511 SUPERVISION OF ELDERLY PRIMIGRAVIDA IN FIRST TRIMESTER: Primary | ICD-10-CM

## 2021-03-26 DIAGNOSIS — Z34.80 SUPERVISION OF OTHER NORMAL PREGNANCY, ANTEPARTUM: ICD-10-CM

## 2021-03-26 PROCEDURE — 99000 SPECIMEN HANDLING OFFICE-LAB: CPT | Performed by: OBSTETRICS & GYNECOLOGY

## 2021-03-26 PROCEDURE — 36415 COLL VENOUS BLD VENIPUNCTURE: CPT | Performed by: OBSTETRICS & GYNECOLOGY

## 2021-03-26 PROCEDURE — 99207 PR FIRST OB VISIT: CPT | Performed by: OBSTETRICS & GYNECOLOGY

## 2021-03-26 PROCEDURE — 99N1100 PR STATISTIC VERIFI PRENATAL TRISOMY 21,18,13: Mod: 90 | Performed by: OBSTETRICS & GYNECOLOGY

## 2021-03-26 NOTE — NURSING NOTE
"Chief Complaint   Patient presents with     Prenatal Care     NPN 11w 1d, discuss genetic testing, weight gain and risks for pre-eclampsia-sister had at end of pregnancy       Initial /68 (BP Location: Left arm, Cuff Size: Adult Large)   Wt 92.5 kg (204 lb)   LMP 2021   BMI 35.79 kg/m   Estimated body mass index is 35.79 kg/m  as calculated from the following:    Height as of 12/15/20: 1.608 m (5' 3.31\").    Weight as of this encounter: 92.5 kg (204 lb).  BP completed using cuff size: large    Questioned patient about current smoking habits.  Pt. has never smoked.          The following HM Due: NONE  Bianca Davila CMA    "

## 2021-03-26 NOTE — PROGRESS NOTES
Alexi is a 35 year old  at 11w1d here for new ob visit.      Unplanned, but very welcome.  FOB is spouse Miguel, same partner as her first.  Son Benja is 13 yo.    x1 in .   H/o dep/anx and panic disorder with AYDIN and PTSD (from her mother having chronic illness when she was a child)- on citalopram 40mg, symptoms are relatively well controlled.  Sees NP in psychiatry for this.   PATRICIA, desires NIPT.  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      OB History    Para Term  AB Living   2 1 1 0 0 1   SAB TAB Ectopic Multiple Live Births   0 0 0 0 1      # Outcome Date GA Lbr Ronnie/2nd Weight Sex Delivery Anes PTL Lv   2 Current            1 Term 06 40w0d  3.175 kg (7 lb) M  None  LUIS FERNANDO      Name: Edd       ROS: Ten point review of systems was reviewed and negative except the above.    Past Medical History:   Diagnosis Date     Anxiety      Depression      Past Surgical History:   Procedure Laterality Date     EXTRACTION(S) DENTAL       Skin mole removal       Patient Active Problem List    Diagnosis Date Noted     Major depressive disorder, recurrent episode, moderate with anxious distress (H) 2019     Priority: Medium     Panic disorder without agoraphobia 2019     Priority: Medium     Anxiety 2017     Priority: Medium        Allergies   Allergen Reactions     Penicillins Hives     Sulfa Drugs Hives     citalopram (CELEXA) 20 MG tablet, Take 2 tablets (40 mg) by mouth daily  Prenatal Vit-Fe Fumarate-FA (PRENATAL MULTIVITAMIN W/IRON) 27-0.8 MG tablet, Take 1 tablet by mouth daily    No current facility-administered medications on file prior to visit.       Physical Exam:   Wt 92.5 kg (204 lb)   LMP 2021   BMI 35.79 kg/m      Gen:  no acute distress, comfortable, smiling  HENT: No scleral injection or icterus  CV: Regular rate and rhythm, no m/g/r  Resp: Normal work of breathing, no cough  GI: Abdomen soft, non-tender. No masses, organomegaly  Skin: No suspicious lesions  or rashes  Psychiatric: mentation appears normal and affect bright    Doptones unable to auscultate  FH cwd  BSUS + FHT    Lab Results   Component Value Date    ABO O 03/10/2021    RH Pos 03/10/2021       A/P 35 year old  at 11w1d here for NOB visit.  - Discussed physician coverage, tertiary support, diet, exercise, weight gain, schedule of visits, routine and indicated ultrasounds, and childbirth education.  Discussed MFM coverage and reviewed options for  testing in the first trimester.  Recommended PNV.  NOB labs and US reviewed.       Concerns:  - O+/RI.  FOB Miguel.  - PTSD/AYDIN on citalopram.  Exposure to citalopram appears to be associated with little to no risk of birth defects.  Reviewed r/b/a of selective serotonin reuptake inhibitor use in pregnancy.    - AMA: NIPT today, plan level 2 US    RTC 4 weeks    Diana Sanders MD, MPH  [facility]

## 2021-03-30 ENCOUNTER — TELEPHONE (OUTPATIENT)
Dept: OBGYN | Facility: CLINIC | Age: 36
End: 2021-03-30

## 2021-03-30 LAB — LAB SCANNED RESULT: NORMAL

## 2021-03-30 NOTE — TELEPHONE ENCOUNTER
Results received from [employer] testing in [facility].  Testing done:  Innatal Prenatal Screen    Action:  LM for pt to cb to report NORMAL results.    Gender:**BOY**  Will ask pt if they wish to know the gender.    Please route to provider as FYI once pt is informed.    Jessica HOWARD RN

## 2021-04-26 ENCOUNTER — TRANSCRIBE ORDERS (OUTPATIENT)
Dept: MATERNAL FETAL MEDICINE | Facility: CLINIC | Age: 36
End: 2021-04-26

## 2021-04-26 ENCOUNTER — PRENATAL OFFICE VISIT (OUTPATIENT)
Dept: OBGYN | Facility: CLINIC | Age: 36
End: 2021-04-26
Payer: COMMERCIAL

## 2021-04-26 VITALS — BODY MASS INDEX: 35.96 KG/M2 | DIASTOLIC BLOOD PRESSURE: 80 MMHG | WEIGHT: 205 LBS | SYSTOLIC BLOOD PRESSURE: 110 MMHG

## 2021-04-26 DIAGNOSIS — O09.529 HIGH-RISK PREGNANCY, ELDERLY MULTIGRAVIDA, UNSPECIFIED TRIMESTER: Primary | ICD-10-CM

## 2021-04-26 DIAGNOSIS — O26.90 PREGNANCY RELATED CONDITION, ANTEPARTUM: Primary | ICD-10-CM

## 2021-04-26 PROCEDURE — 82950 GLUCOSE TEST: CPT | Performed by: OBSTETRICS & GYNECOLOGY

## 2021-04-26 PROCEDURE — 36415 COLL VENOUS BLD VENIPUNCTURE: CPT | Performed by: OBSTETRICS & GYNECOLOGY

## 2021-04-26 PROCEDURE — 99207 PR PRENATAL VISIT: CPT | Performed by: OBSTETRICS & GYNECOLOGY

## 2021-04-26 NOTE — PROGRESS NOTES
35 year old  at 15w4d     - O+/RI.  FOB Miguel.  NIPT nl XY.  Plans to breastfeed, rx given today.  Reviewed quickening.  - PTSD/AYDIN on citalopram.  Exposure to citalopram appears to be associated with little to no risk of birth defects.   - AMA: NIPT nl, plan level 2 US.    - BMI 36, early GCT today  - reviewed R/B/A of covid vaccine, she's unsure at this point if she'll do it    RTC 4 wks     Diana Sanders MD, MPH  [facility]

## 2021-04-26 NOTE — NURSING NOTE
"Chief Complaint   Patient presents with     Prenatal Care     15 4/7 weeks       Initial /80   Wt 93 kg (205 lb)   LMP 2021   BMI 35.96 kg/m   Estimated body mass index is 35.96 kg/m  as calculated from the following:    Height as of 12/15/20: 1.608 m (5' 3.31\").    Weight as of this encounter: 93 kg (205 lb).  BP completed using cuff size: regular    Questioned patient about current smoking habits.  Pt. has never smoked.          The following HM Due: NONE    Fartun Lee CMA    "

## 2021-04-27 LAB — GLUCOSE 1H P 50 G GLC PO SERPL-MCNC: 101 MG/DL (ref 60–129)

## 2021-05-17 ENCOUNTER — MYC MEDICAL ADVICE (OUTPATIENT)
Dept: OBGYN | Facility: CLINIC | Age: 36
End: 2021-05-17

## 2021-05-19 ENCOUNTER — PRE VISIT (OUTPATIENT)
Dept: MATERNAL FETAL MEDICINE | Facility: CLINIC | Age: 36
End: 2021-05-19

## 2021-05-24 ENCOUNTER — PRENATAL OFFICE VISIT (OUTPATIENT)
Dept: OBGYN | Facility: CLINIC | Age: 36
End: 2021-05-24
Payer: COMMERCIAL

## 2021-05-24 VITALS — SYSTOLIC BLOOD PRESSURE: 110 MMHG | WEIGHT: 205 LBS | DIASTOLIC BLOOD PRESSURE: 74 MMHG | BODY MASS INDEX: 35.96 KG/M2

## 2021-05-24 DIAGNOSIS — O09.529 HIGH-RISK PREGNANCY, ELDERLY MULTIGRAVIDA, UNSPECIFIED TRIMESTER: ICD-10-CM

## 2021-05-24 DIAGNOSIS — Z34.80 SUPERVISION OF OTHER NORMAL PREGNANCY, ANTEPARTUM: Primary | ICD-10-CM

## 2021-05-24 PROCEDURE — 99207 PR PRENATAL VISIT: CPT | Performed by: OBSTETRICS & GYNECOLOGY

## 2021-05-24 NOTE — NURSING NOTE
"Chief Complaint   Patient presents with     Prenatal Care     19 4/7 weeks       Initial /74   Wt 93 kg (205 lb)   LMP 2021   BMI 35.96 kg/m   Estimated body mass index is 35.96 kg/m  as calculated from the following:    Height as of 12/15/20: 1.608 m (5' 3.31\").    Weight as of this encounter: 93 kg (205 lb).  BP completed using cuff size: regular    Questioned patient about current smoking habits.  Pt. has never smoked.          The following HM Due: NONE  +fetal movement    Fartun Lee, CMA    "

## 2021-05-24 NOTE — PROGRESS NOTES
35 year old  at 19w4d     - had some bilateral lower quadrant pains, likely round ligament pains, reviewed palliative measures  - O+/RI.  FOB Miguel.  NIPT nl XY.  Plans to breastfeed  - PTSD/AYDIN on citalopram.  Exposure to citalopram appears to be associated with little to no risk of birth defects.   - AMA: NIPT nl, plan level 2 US.    - BMI 36, early GCT nl    RTC 4 wks     Diana Sanders MD, MPH  [facility]

## 2021-05-26 ENCOUNTER — HOSPITAL ENCOUNTER (OUTPATIENT)
Dept: ULTRASOUND IMAGING | Facility: CLINIC | Age: 36
End: 2021-05-26
Attending: OBSTETRICS & GYNECOLOGY
Payer: COMMERCIAL

## 2021-05-26 ENCOUNTER — OFFICE VISIT (OUTPATIENT)
Dept: MATERNAL FETAL MEDICINE | Facility: CLINIC | Age: 36
End: 2021-05-26
Attending: OBSTETRICS & GYNECOLOGY
Payer: COMMERCIAL

## 2021-05-26 DIAGNOSIS — O26.90 PREGNANCY RELATED CONDITION, ANTEPARTUM: ICD-10-CM

## 2021-05-26 DIAGNOSIS — O09.522 MULTIGRAVIDA OF ADVANCED MATERNAL AGE IN SECOND TRIMESTER: Primary | ICD-10-CM

## 2021-05-26 PROCEDURE — 76811 OB US DETAILED SNGL FETUS: CPT

## 2021-05-26 PROCEDURE — 76811 OB US DETAILED SNGL FETUS: CPT | Mod: 26 | Performed by: OBSTETRICS & GYNECOLOGY

## 2021-05-26 NOTE — PROGRESS NOTES
"Please see \"Imaging\" tab under \"Chart Review\" for details of today's US.    Janice Rangel, DO    "

## 2021-05-28 ENCOUNTER — TELEPHONE (OUTPATIENT)
Dept: OBGYN | Facility: CLINIC | Age: 36
End: 2021-05-28

## 2021-05-28 NOTE — TELEPHONE ENCOUNTER
Forms faxed back as directed    Forms filled out and in [employer] basket for signature  Fartun Lee CMA

## 2021-05-28 NOTE — TELEPHONE ENCOUNTER
Form received from: via fax for pt; Alexi Montemayor     Form requesting following info/need: FMLA for [place] [employer]     JONAS needed?: NA     Location of form: basket above Kika's desk     When completed the route for return: fax to number on form

## 2021-05-30 VITALS — BODY MASS INDEX: 27.1 KG/M2 | WEIGHT: 153 LBS

## 2021-05-31 VITALS — BODY MASS INDEX: 28.05 KG/M2 | HEIGHT: 64 IN | WEIGHT: 164.31 LBS

## 2021-05-31 VITALS — HEIGHT: 63 IN | WEIGHT: 163 LBS | BODY MASS INDEX: 28.88 KG/M2

## 2021-06-01 ENCOUNTER — TELEPHONE (OUTPATIENT)
Dept: OBGYN | Facility: CLINIC | Age: 36
End: 2021-06-01

## 2021-06-01 ENCOUNTER — MYC MEDICAL ADVICE (OUTPATIENT)
Dept: OBGYN | Facility: CLINIC | Age: 36
End: 2021-06-01

## 2021-06-01 VITALS — BODY MASS INDEX: 27.91 KG/M2 | WEIGHT: 162.6 LBS

## 2021-06-01 VITALS — BODY MASS INDEX: 27.81 KG/M2 | WEIGHT: 162 LBS

## 2021-06-01 VITALS — BODY MASS INDEX: 28.6 KG/M2 | WEIGHT: 164 LBS

## 2021-06-01 NOTE — TELEPHONE ENCOUNTER
Form received from: Patient    Form requesting following info/need: FMLA    JNOAS needed?: No    Location of form: Kika's desk    When completed the route for return: Fax [phone]

## 2021-06-01 NOTE — TELEPHONE ENCOUNTER
Forms refaxed with intermittent leave dates  Fartun Lee CMA      I left voicmail to have Alexi call back with specific dates that she wants on the form  Fartun Lee CMA

## 2021-06-01 NOTE — TELEPHONE ENCOUNTER
Pt will resend forms to have a couple days added as listed in mychart message.    Gisselle WORRELL R.N.

## 2021-06-03 ENCOUNTER — TELEPHONE (OUTPATIENT)
Dept: OBGYN | Facility: CLINIC | Age: 36
End: 2021-06-03

## 2021-06-03 ENCOUNTER — MYC MEDICAL ADVICE (OUTPATIENT)
Dept: OBGYN | Facility: CLINIC | Age: 36
End: 2021-06-03

## 2021-06-03 NOTE — TELEPHONE ENCOUNTER
Form received from: Patient / [place]    Form requesting following info/need: Parking Accomodation    JONAS needed?: No    Location of form: Kika's desk    When completed the route for return: Fax [phone]

## 2021-06-09 NOTE — PROGRESS NOTES
Subjective:      Patient ID: Alexi Montemayor is a 31 y.o. female.    Chief Complaint:    HPI  Patient is here asking to get rechecked for oral trush.  She was on clindamycin for 1 week a while back (2 months ago) and she noticed white patches in her oral cavity she didn't saw her dentist and was started on nystatin(2 weeks ago) which she didn't start up until recently.  She states nystatin helped improve trust in her mouth but then she started noticing redness around her labia and she ties she might have a yeast infection so she went to ER(02/22/2017) and she was given Diflucan which she has taken 1 dose and she is due to take the second dose and couple of days.  She is states that she still thinks she has trash in her mouth and is wondering why back of her tongue looks whitish even though she states the nystatin has helped with her symptoms.  Guarding her genital area she did have an exam last time and it was empiric treatment and she thinks she is getting better but still she wants to get rechecked.  She is immunocompetent and hasn't had blood tests done in a while denies any history of HIV cancer with chemotherapy and no diabetes.  Denies weight loss, fatigue or nausea vomiting    Social History   Substance Use Topics     Smoking status: Never Smoker     Smokeless tobacco: None     Alcohol use None       Review of Systems   Constitutional: Negative for activity change, appetite change, chills, diaphoresis, fatigue, fever and unexpected weight change.   HENT: Positive for mouth sores. Negative for drooling, ear discharge, ear pain, facial swelling, hearing loss, rhinorrhea, sinus pressure, sore throat, trouble swallowing and voice change.    Cardiovascular: Negative.    Genitourinary: Negative for pelvic pain and vaginal pain.   Musculoskeletal: Negative.        Objective:     Visit Vitals     /80     Pulse (!) 102     Temp 98.1  F (36.7  C) (Oral)     Wt 153 lb (69.4 kg)     LMP 01/22/2017     SpO2 100%      BMI 27.1 kg/m2       Physical Exam   Constitutional: She is oriented to person, place, and time. She appears well-developed and well-nourished. No distress.   HENT:   Right Ear: External ear normal.   Left Ear: External ear normal.   Nose: Nose normal.   Mouth/Throat: Oropharynx is clear and moist. No oropharyngeal exudate.   Negative complete exam of oral cavity and no canker sore was seen.  Also no sign of triceps was seen.  Her time does have a whitish coat at the back which might be trush    Eyes: Conjunctivae are normal. Pupils are equal, round, and reactive to light. Right eye exhibits no discharge. Left eye exhibits no discharge. No scleral icterus.   Neck: Normal range of motion. Neck supple. No thyromegaly present.   Pulmonary/Chest: She has no wheezes. She has no rales.   Musculoskeletal: Normal range of motion.   Neurological: She is alert and oriented to person, place, and time.   Skin: She is not diaphoretic.       Assessment:     Procedures    Trush recheck     Plan:     Based on my exam patient's overall cavity looked normal and advised her to finish her Diflucan course and if she still thinks she has problems she has to follow-up with her primary care to have complete lab tests done also that perhaps should be done to check for yeast infection.  Patient has lots of question regarding yeast clindamycin and oral canker sores which I tried to answer.  Patient to make an appointment with her primary care in 1 week to 10 days and have another recheck
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
## 2021-06-13 NOTE — PROGRESS NOTES
Assessment/Plan:  1. Routine general medical examination at a health care facility  Lipid Cascade    Glucose    Hemoglobin     Patient is a 32 y.o. female here for physical exam. See health maintenance section below. Labs as ordered.      Check if you have any family history of adenomatous colon polyps. If adenomatous, will want colonoscopies starting at age 40 rather than 50.     Pap within a year.     Declines flu shot today. Can get at work if you wish.     Declined hepatitis A shots today.    Psychology Referral if needed for history of anxiety: [facility] [place] [phone]  [address] [place] [id number]     Carb recommendation: Determine the number of grams of carbs you need each day by calculating 45 to 65 percent of your total calorie intake, and dividing by 4. For example, if you eat a 2,000-calorie diet, shoot for 225 to 325 grams of carbs per day     Sugar recommendation: For most American women, this is no more than 100 calories per day or about 6 teaspoons per day.     Recommend [id number] calories per day to loose about a pound a week.    Exercise 30 min most days of week.    HPI    Chief Complaint   Patient presents with     Annual Exam     physical w/ pap       Health Maintenance   Topic Date Due     ADVANCE DIRECTIVES DISCUSSED WITH PATIENT  Packet given     PAP SMEAR  Within a year     INFLUENZA VACCINE RULE BASED (1) Can get at work if wishes     TD 18+ HE  05/03/2022     TDAP ADULT ONE TIME DOSE  Completed        Patient Active Problem List   Diagnosis     Anxiety     Current Outpatient Prescriptions   Medication Sig     multivitamin therapeutic tablet Take 1 tablet by mouth daily.       Patient is a 32 y.o. female presents for a physical exam.    She has a goal of 40 pounds that she would like to lose. She has modified her diet and daily activities, as well as added multivitamins to her diet. She has been going to the gym every day. She is looking to  "conceive after her weight loss. She notes that her lifestyle change has helped her anxiety as well. Joint pain has since resolved since change of lifestyle.     The following portions of the patient's history were reviewed and updated as appropriate: past medical history, past social history, past surgical history and problem list.    Review of Systems  Pertinent items are noted in HPI.  Immunization History   Administered Date(s) Administered     Tdap 05/03/2012     No results found for this or any previous visit (from the past 240 hour(s)).  I have had an Advance Directives discussion with the patient.  Objective:    /84 (Patient Site: Right Arm, Patient Position: Sitting, Cuff Size: Adult Regular)  Pulse 84  Temp 98.3  F (36.8  C) (Oral)   Resp 16  Ht 5' 3.5\" (1.613 m)  Wt 164 lb 5 oz (74.5 kg)  LMP 09/28/2017  BMI 28.65 kg/m2    PHYSICAL EXAM:  General Appearance: Reveals an alert, pleasant female.   Vitals:  Per nursing notes.  Eyes: PERRL, conjunctiva/corneas clear, EOM's intact   Ears: Normal TM's and external ear canals, both ears   Lungs: Clear to auscultation bilaterally, respirations unlabored, no wheezing or rales   Heart: Regular rate and rhythm, S1 and S2 normal, no murmur, rub, or gallop, no carotid bruits  Breasts: No breast masses, tenderness, asymmetry, or nipple discharge.   Skin: Skin color, texture, turgor normal, no rashes or lesions   Psych: Normal affect. Does not appear anxious or depressed.     ADDITIONAL HISTORY SUMMARIZED (2): None.  DECISION TO OBTAIN EXTRA INFORMATION (1): Accessed Care Everywhere  RADIOLOGY TESTS (1): None.  LABS (1): Reviewed and ordered labs.   MEDICINE TESTS (1): None.  INDEPENDENT REVIEW (2 each): None.     The visit lasted a total of 30 minutes face to face with the patient. Over 50% of the time was spent counseling and educating the patient about health maintenance.    Lisa STERLING, am scribing for and in the presence of, Dr. Toribio.    Dr. ASHER" Mouna Toribio MD, personally performed the services described in this documentation, as scribed by Lisa Farrell in my presence, and it is both accurate and complete.    Total data points: 2

## 2021-06-15 ENCOUNTER — MYC MEDICAL ADVICE (OUTPATIENT)
Dept: OBGYN | Facility: CLINIC | Age: 36
End: 2021-06-15

## 2021-06-16 ENCOUNTER — HOSPITAL ENCOUNTER (OUTPATIENT)
Dept: ULTRASOUND IMAGING | Facility: CLINIC | Age: 36
End: 2021-06-16
Attending: OBSTETRICS & GYNECOLOGY
Payer: COMMERCIAL

## 2021-06-16 ENCOUNTER — OFFICE VISIT (OUTPATIENT)
Dept: MATERNAL FETAL MEDICINE | Facility: CLINIC | Age: 36
End: 2021-06-16
Attending: OBSTETRICS & GYNECOLOGY
Payer: COMMERCIAL

## 2021-06-16 PROCEDURE — 76816 OB US FOLLOW-UP PER FETUS: CPT

## 2021-06-16 PROCEDURE — 76816 OB US FOLLOW-UP PER FETUS: CPT | Mod: 26 | Performed by: OBSTETRICS & GYNECOLOGY

## 2021-06-16 NOTE — PROGRESS NOTES
"Please see \"Imaging\" tab under \"Chart Review\" for details of today's US at the [facility].    Warren Holley MD  Maternal-Fetal Medicine    "

## 2021-06-16 NOTE — PROGRESS NOTES
Chief Complaint   Patient presents with     Cough     5x days. admit of chills, low fever.          HPI    Patient is here for 5 days of cough, started as dry, then the last few days became productive associated with subjective fever and chills. She also has some nasal congestion. No chest pain, shortness of breath. No body aches.     ROS: Pertinent ROS noted in HPI.     Allergies   Allergen Reactions     Penicillins        Patient Active Problem List   Diagnosis     Anxiety       Family History   Problem Relation Age of Onset     Multiple sclerosis Mother      Vaginal cancer Mother      Anxiety disorder Mother      Lupus Maternal Uncle      Hypertension Father      Alcohol abuse Father      Depression Sister      Anxiety disorder Sister      Schizophrenia Maternal Aunt      Colon cancer Neg Hx      Breast cancer Neg Hx        Social History     Social History     Marital status:      Spouse name: N/A     Number of children: N/A     Years of education: N/A     Occupational History     Not on file.     Social History Main Topics     Smoking status: Never Smoker     Smokeless tobacco: Never Used     Alcohol use Yes      Comment: socially, 1-2 a month     Drug use: No     Sexual activity: Not on file     Other Topics Concern     Not on file     Social History Narrative         Objective:    Vitals:    03/06/18 1330   BP: 120/60   Pulse: 97   Resp: 14   Temp: 98.2  F (36.8  C)   SpO2: 98%       Gen:NAD  Oropharynx: clear without lesions  Ears: TMs clear without effusions, ear canals normal with minimal cerumen   Nose: no discharge  CV:RRR, no M, R, G  Pulm: CTAB, normal effort    Impression:    Viral bronchitis - benign exam.    Plan:    Supportive measures as discussed (fluids, OTC cough products. Recommended Benzonatate but patient declined).   F/u prn

## 2021-06-18 NOTE — PROGRESS NOTES
Assessment:     1. Anxiety  Ambulatory referral to Psychology    citalopram (CELEXA) 20 MG tablet    hydrOXYzine HCl (ATARAX) 25 MG tablet   2. Feeling grief          anxiety disorder. Possible organic contributing causes are: none.    We did together from up-to-date that diazepam's are not indicated grief reaction.  Patient is agreeable and would like to try hydroxyzine.  If she would like limited prescription of lorazepam at a future date, these will be filled for her.    Plan:      Medications: Celexa and Vistaril.  Labs: no labs indicated at this time.  Recommended counseling.  Handouts describing disease, natural history, and treatment were given to the patient.  Reviewed concept of anxiety as biochemical imbalance of neurotransmitters and rationale for treatment.  Spent 25 minutes (>50% of visit) discussing the risks of anxiety disorder and grief, the  pathophysiology, etiology, risks, and principles of treatment.     Subjective:       Alexi Montemayor is a 32 y.o. female who presents for new evaluation and treatment of anxiety disorder. She has the following anxiety symptoms: difficulty concentrating, dizziness, feelings of losing control, palpitations, panic attacks, paresthesias, psychomotor agitation, racing thoughts, shortness of breath and sweating. Onset of symptoms was approximately 10 years ago. Symptoms have been gradually worsening since that time. She denies current suicidal and homicidal ideation. Family history significant for anxiety. Risk factors: negative life event MOM HD MS FOR MANY YEARS AND NOW IN HOSPICE. Previous treatment includes NONE. She complains of the following medication side effects: none.    Informs me that her mom has been in a nursing home for many years and has not returned hospice.  She is very anxious anticipating her end.  She reports that she has had anxiety over many years possibly due to this.  In the past she has been referred to psychology counseling but has never  followed up.  She is worried about taking medication that can cause dependence.  In the past she has tried Lorazepam with some benefit.    The following portions of the patient's history were reviewed and updated as appropriate: allergies, current medications, past family history, past medical history, past social history, past surgical history and problem list.  Allergies   Allergen Reactions     Penicillins        Current Outpatient Prescriptions on File Prior to Visit   Medication Sig Dispense Refill     multivitamin therapeutic tablet Take 1 tablet by mouth daily.       No current facility-administered medications on file prior to visit.        Patient Active Problem List   Diagnosis     Anxiety       Past Medical History:   Diagnosis Date     Anxiety      Heartburn        History reviewed. No pertinent surgical history.    Family History   Problem Relation Age of Onset     Multiple sclerosis Mother      Vaginal cancer Mother      Anxiety disorder Mother      Lupus Maternal Uncle      Hypertension Father      Alcohol abuse Father      Depression Sister      Anxiety disorder Sister      Schizophrenia Maternal Aunt      Colon cancer Neg Hx      Breast cancer Neg Hx        Social History     Social History     Marital status:      Spouse name: N/A     Number of children: N/A     Years of education: N/A     Social History Main Topics     Smoking status: Never Smoker     Smokeless tobacco: Never Used     Alcohol use Yes      Comment: socially, 1-2 a month     Drug use: No     Sexual activity: Yes     Partners: Male     Other Topics Concern     None     Social History Narrative    She is  and lives with her  and her 11-year-old son.  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         Gricel Becerra MD  5/16/2018               Review of Systems  A 12 point comprehensive review of systems was negative except as noted.      Objective:     /84 (Patient Site: Right Arm,  Patient Position: Sitting, Cuff Size: Adult Regular)  Pulse 88  Temp 98.5  F (36.9  C) (Oral)   Resp 18  Wt 162 lb 9.6 oz (73.8 kg)  BMI 28.35 kg/m2     General Appearance: healthy, alert, oriented and no distress  Good eye contact, normal speech and content, no flight of ideas. Animated during conversation.  Neurological exam: gait normal, alert and oriented X 3    Little interest or pleasure in doing things: Several days  Feeling down, depressed, or hopeless: Several days  Trouble falling or staying asleep, or sleeping too much: Not at all  Feeling tired or having little energy: Not at all  Poor appetite or overeating: Not at all  Feeling bad about yourself - or that you are a failure or have let yourself or your family down: Several days  Trouble concentrating on things, such as reading the newspaper or watching television: Not at all  Moving or speaking so slowly that other people could have noticed. Or the opposite - being so fidgety or restless that you have been moving around a lot more than usual: Not at all  Thoughts that you would be better off dead, or of hurting yourself in some way: Not at all  PHQ-9 Total Score: 3  No Data Recorded

## 2021-06-18 NOTE — PROGRESS NOTES
MENTAL HEALTH VISIT NOTE    06/26/2018   Start time: 400    Stop Time: 455   Session # 1    Alexi Montemayor is a 32 y.o. female is being seen today for    Chief Complaint   Patient presents with      Follow Up     New symptoms or complaints: Patient reported noticing her anxiety at work.     Functional Impairment:   Personal: 3  Family: 1  Work: 3  Social:2    Clinical assessment of mental status: Alexi Montemayor presented on time.  She was open and cooperative, and dressed appropriately for this session and weather. Her memory was good for short and long term.  Her  speech and language was soft and concise. Concentration and focus is within normal limits. Psychosis is not noted or reported. She reports her mood is anxious.  Affect is congruent with speech.  Fund of knowledge is adequate. Insight is adequate for therapy.     Suicidal/Homicidal Ideation present: None Reported This Session    Patient's impression of their current status: Patient reported having some anxiety. Patient indicated this week she was able to work in a different area and noticing a decrease in her anxiety. Patient reported being unsure if she will be able to stay in that area but does not feel it is likely at this time. Patient indicated she has applied for another job and has an interview. Patient reported when she thinks about the interview she an increase in her anxiety. Patient indicated she has noticed that she does not enjoy small talk and finds that to be a challenge as well.     Therapist impression of patients current state: Patient appears to have fair insight into her mental health. This therapist challenged patient on times she has noticed an increase in her anxiety. This therapist processed with patient coping skills that can help to reduce anxiety including grounding, deep breathing, meditation and yoga.     Type of psychotherapeutic technique provided: Insight oriented, Client centered and CBT    Progress toward short term  goals: Progress as expected with patient returning to scheduled session, noticing anxiety triggers and using coping skills.     Review of long term goals: Not done at today's visit    Diagnosis:   1. Panic disorder without agoraphobia      Plan and Follow up: Patient will return in 1 week for scheduled session. Patient will benefit from continuing to work on identifying triggers to her anxiety. Patient should use coping skills taught in session to try and reduce anxiety that is occurring.    Discharge Criteria/Planning: Patient will continue with follow-up until therapy can be discontinued without return of signs and symptoms.    Ila Wiggins
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
## 2021-06-19 NOTE — PROGRESS NOTES
MENTAL HEALTH VISIT NOTE    07/05/2018   Start time: 400    Stop Time: 455   Session # 2    Alexi Montemayor is a 32 y.o. female is being seen today for    Chief Complaint   Patient presents with      Follow Up     New symptoms or complaints: None  Functional Impairment:   Personal: 3  Family: 1  Work: 3  Social:2    Clinical assessment of mental status: Alexi Montemayor presented on time for scheduled session.  She was open and cooperative, and dressed appropriately for this session and weather. Her memory was good for short and long term.  Her  speech and language was soft and concise. Concentration and focus is within normal limits. Psychosis is not noted or reported. She reports her mood is anxious.  Affect is congruent with speech.  Fund of knowledge is adequate. Insight is adequate for therapy.     Suicidal/Homicidal Ideation present: None Reported This Session    Patient's impression of their current status: Patient indicated continuing to notice some anxiety. Patient reported today being a good day with little to no anxiety but still having anxiety at work. 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 Patient reported talking with co-workers who have also experienced anxiety and challenges with the change in the building. Patient talked about her continued worries about her mom and struggling with accepting she is in hospice.     Therapist impression of patients current state: Patient appears to have fair insight into her mental health. This therapist processed with patient ways her work increases her anxiety. This therapist went over different coping skills that can be beneficial to reduce anxiety. This therapist challenged patient on ways she is struggling with her mom's health while trying to stay strong.     Type of psychotherapeutic technique provided: Insight oriented, Client centered and CBT    Progress toward short term goals: Progress  as expected with patient returning to scheduled session, noticing anxiety triggers and using coping skills.     Review of long term goals: Not done at today's visit    Diagnosis:   1. Panic disorder without agoraphobia      Plan and Follow up: Patient will return in 1 week for scheduled session. Patient will benefit from continuing to work on identifying triggers to her anxiety. Patient should use coping skills taught in session to try and reduce anxiety that is occurring.    Discharge Criteria/Planning: Patient will continue with follow-up until therapy can be discontinued without return of signs and symptoms.    Ila Wiggins

## 2021-06-19 NOTE — PROGRESS NOTES
This therapist contacted patient after she no showed for her scheduled session. Patient indicated she forgot about her appointment and will call back to reschedule.

## 2021-06-19 NOTE — PROGRESS NOTES
Outpatient Mental Health Treatment Plan    Name:  Alexi Montemayor  :  1985  MRN:  [id number]    Treatment Plan:  Initial Treatment Plan  Intake/initial treatment plan date:  2018  Benefit and risks and alternatives have been discussed: Yes  Is this treatment appropriate with minimal intrusion/restrictions: Yes  Estimated duration of treatment:  Ongoing therapy at this time  Anticipated frequency of services:  Every 2 weeks  Necessity for frequency: This frequency is needed to establish therapeutic goals and for continuity of care in order to monitor progress.  Necessity for treatment: To address cognitive, behavioral, and/or emotional barriers in order to work toward goals and to improve quality of life.      Plan:           ?   ? Anxiety    Goal:  Decrease average anxiety level from 4 to 1.   Strategies: ? [x]Learn and practice relaxation techniques and other coping strategies (e.g., thought stopping, reframing, meditation)     ? [x] Increase involvement in meaningful activities     ? [x] Discuss sleep hygiene     ? [x] Explore thoughts and expectations about self and others     ? [x] Identify and monitor triggers for panic/anxiety symptoms     ? [x] Implement physical activity routine (with physician approval)     ? [x] Consider introduction of bibliotherapy and/or videos     ? [x] Continue compliance with medical treatment plan (or explore barriers)     Degree to which this is a problem (1-4): 4  Degree to which goal is met (1-4): 1    Date of next review: 2018    Functional Impairment: 1=Not at all/Rarely  2=Some days  3=Most Days  4=Every Day  Personal: 3  Family: 3  Social: 3  Work: 4    Diagnosis:  Panic disorder without agoraphobia     WHODAS 2.0 12-item version= 10.42%   H1= 10  H2= 2  H3= 5     Clinical assessments completed:WHODAS, CAGE-AID, PANSI.      Strengths:   Patient reported her strengths being caring and thoughtful.     Weaknesses (what does patient identify as a  weakness):  Patient indicated her weaknesses being her anxiety and worrying.     Cultural Considerations: Patient is a 32 year old   female.     Persons responsible for this plan:  ? [x] Patient ? [x] Provider ? [] Other: __________________      Provider:Performed and documented by CELIA Hernandez          Patient Signature:____________________________________ Date: ______________       Therapist Signature: __________________________________ Date: ______________

## 2021-06-19 NOTE — PROGRESS NOTES
Assessment:     1. Right knee pain  Ambulatory referral to Physical Therapy   2. Multiple joint pain  Rheumatoid Factor Screen    Antinuclear Antibody (EZRA) Cascade   3. PFS (patellofemoral syndrome)  Ambulatory referral to Physical Therapy   4. Anxiety          Right Mild patellofemoral syndrome on the right    PT referral and RTC for further workup if persistent or worsening     Plan:      Natural history and expected course discussed. Questions answered.  Educational materials distributed.  Rest, ice, compression, and elevation (RICE) therapy.  Reduction in offending activity.  Quad strengthening exercises.  OTC analgesics as needed.  PT referral.     Subjective:     Chief Complaint   Patient presents with     Joint Pain     knees, elbows, fingers and wrists.  Comes and gos.        Alexi Montemayor is a 32 y.o. female who presents with knee pain involving both knees. Onset was gradual, starting about a few weeks ago. Inciting event: associated with running program. Current symptoms include: pain located right knee > left knee. Pain is aggravated by lateral movements, standing and Feels that she has prolonged standing at her work where she has standing workstation.. Patient has had no prior knee problems. Evaluation to date: none. Treatment to date: none.  She is also had some joint pains of her finger and wrist and is concerned about rheumatoid arthritis and lupus.      Of note patient does have anxiety and her mom has debilitating MS    At last visit patient was referred to us counseling and started on Celexa and hydroxyzine.  Patient is not taking any of the medications at this time.  I did suggest that she use hydroxyzine if she is really anxious and to try to see if this is beneficial.    The following portions of the patient's history were reviewed and updated as appropriate: allergies, current medications, past family history, past medical history, past social history, past surgical history and problem  list.     Allergies   Allergen Reactions     Penicillins        Current Outpatient Prescriptions on File Prior to Visit   Medication Sig Dispense Refill     hydrOXYzine HCl (ATARAX) 25 MG tablet Take 1 tablet (25 mg total) by mouth 3 (three) times a day as needed for itching. 60 tablet 1     [DISCONTINUED] citalopram (CELEXA) 20 MG tablet Take 1 tablet (20 mg total) by mouth daily. 30 tablet 2     [DISCONTINUED] multivitamin therapeutic tablet Take 1 tablet by mouth daily.       No current facility-administered medications on file prior to visit.        Patient Active Problem List   Diagnosis     Anxiety       Past Medical History:   Diagnosis Date     Anxiety      Heartburn        No past surgical history on file.    Family History   Problem Relation Age of Onset     Multiple sclerosis Mother      Vaginal cancer Mother      Anxiety disorder Mother      Lupus Maternal Uncle      Hypertension Father      Alcohol abuse Father      Depression Sister      Anxiety disorder Sister      Schizophrenia Maternal Aunt      Colon cancer Neg Hx      Breast cancer Neg Hx        Social History     Social History     Marital status:      Spouse name: N/A     Number of children: N/A     Years of education: N/A     Social History Main Topics     Smoking status: Never Smoker     Smokeless tobacco: Never Used     Alcohol use Yes      Comment: socially, 1-2 a month     Drug use: No     Sexual activity: Yes     Partners: Male     Other Topics Concern     None     Social History Narrative    She is  and lives with her  and her 11-year-old son.  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         Gricel Becerra MD  5/16/2018               Review of Systems  Pertinent items are noted in HPI.     Objective:      /86 (Patient Site: Left Arm, Patient Position: Sitting, Cuff Size: Adult Regular)  Pulse 75  Resp 14  Wt 162 lb (73.5 kg)  LMP 06/25/2018  SpO2 99%  Breastfeeding? No  BMI  28.25 kg/m2        General Appearance: healthy, alert, oriented and no distress  HEENT Exam: Oropharynx clear without lesion or exudate  Neck:  supple, no adenopathy, thyroid normal  Heart: Normal heart sounds, S1 and S2, No murmurs.  Lungs: Normal breath sounds, Clear to auscultation bilateral  Skin exam: No visible or palpable abnormalities  Neurological exam: gait normal, alert and oriented X 3  Hem/Lymph/Immuno exam: negative findings:  no cervical nodes, no supraclavicular nodes,  Musculoskeletal exam: Right knee shows tenderness underneath the patella.  No swelling no other deformity FR OM.  Left knee exam is normal.  Examination of the small joint of hand and wrist is normal

## 2021-06-23 NOTE — PROGRESS NOTES
Alexi Montemayor is a 33 y.o. female seen in consultation at the request of Dr. Toribio for left sided, non pulsatile, tinnitus for 2 months  Patient has not noticed.  Denies otologic history of infections or surgeries. Denies vertigo.  She denies family history of hearing loss.    ALLERGY:    Allergies   Allergen Reactions     Penicillins        MEDICATIONS:     Current Outpatient Medications on File Prior to Visit   Medication Sig Dispense Refill     [DISCONTINUED] hydrOXYzine HCl (ATARAX) 25 MG tablet Take 1 tablet (25 mg total) by mouth 3 (three) times a day as needed for itching. (Patient taking differently: Take 25 mg by mouth 3 (three) times a day as needed for itching or anxiety. ) 60 tablet 1     No current facility-administered medications on file prior to visit.        Past Medical/Surgical History, Family History and Social History reviewed in detail and documented separately in the medical record.    Complete Review of Systems:  A 10-point review was performed.  Pertinent positives are noted in the HPI and on a separate scanned document in the chart.    EXAM:  There were no vitals filed for this visit.    Nurse documentation reviewed  and documented separately.    General Appearance: Pleasant, alert, appropriate appearance for age. No acute distress    Head Exam: Normal. Normocephalic, atraumatic.    Eye Exam: Normal external eye, conjunctiva, lids, cornea. Extra-ocular movements are intact.    Left external ear: normal  Left otoscopic exam: Normal EAC. Normal TM     Right external ear: normal  Right otoscopic exam: Normal EAC. Normal TM    Nose Exam: Normal external nose. Septum midline. Nasal mucosa normal.  Inferior turbinates normal.    OroPharynx Exam: Dental hygiene adequate. Normal tongue. Normal buccal mucosa. Normal palate.  Normal pharynx. Normal tonsils.    Neck Exam: Supple, no masses or nodes. Trachea and larynx midline.    Thyroid Exam: No tenderness, nodules or enlargement.    Salivary  Glands: nontender without masses    Neuro: Alert and oriented times 3, CN 2-12 grossly intact, no nystagmus, PERRL, EOMI, normal speech and gait    Chest/Respiratory Exam: Normal chest wall motion and respiratory effort. No audible stridor or wheezing.    Cardiovascular Exam: Regular rate and rhythm.  No cyanosis, clubbing or edema.    Pulses: carotid pulses normal    ASSESSMENT:  1. Tinnitus, left        PLAN: Findings, assessment, and management options were discussed.  Discussed pathophysiology, masking techniques and triggers for tinnitus.  We discussed current guidelines in regards to approach to tinnitus.  Unfortunately there are not many satisfying answers but working on triggers may be helpful.  Given asymmetry I have asked her to get another audiogram in 1 year.

## 2021-06-23 NOTE — PROGRESS NOTES
Audiology Report    Referring Provider:   Service    History:  Alexi Montemayor is seen in conjunction with ENT appointment today. She has a history of tinnitus bilaterally. She reports this is worse in the left ear since November 2018. She states it is worse at night. She also reports jaw clicking and crackling in her ears with shooting otalgia for a few weeks. She reports the otalgia has resolved. She reports occasional dizziness when turning her head. She denies any changes in hearing, otorrhea, family history of hearing loss, or loud noise exposure. .    Results:     Left Ear Right Ear   Otoscopy clear canals clear canals   Pure Tone Audiometry normal hearing   normal hearing   Word Recognition excellent excellent   Tympanometry normal (Type A)  normal (Type A)     Transducer: Circumaural headphones    Reliability was good  and there was good  SRT to PTA agreement.       Plan:  The patient is returned to ENT for follow up.  She should return for retesting with ENT recommendation.      Please see audiogram under  media  and  audiogram  in the patient s chart.     Butch Barbosa, CCC-A  [place] Licensed Audiologist #[id number]

## 2021-06-28 ENCOUNTER — PRENATAL OFFICE VISIT (OUTPATIENT)
Dept: OBGYN | Facility: CLINIC | Age: 36
End: 2021-06-28
Payer: COMMERCIAL

## 2021-06-28 VITALS
WEIGHT: 211.1 LBS | HEIGHT: 63 IN | BODY MASS INDEX: 37.4 KG/M2 | DIASTOLIC BLOOD PRESSURE: 70 MMHG | SYSTOLIC BLOOD PRESSURE: 118 MMHG

## 2021-06-28 DIAGNOSIS — Z34.80 SUPERVISION OF OTHER NORMAL PREGNANCY, ANTEPARTUM: Primary | ICD-10-CM

## 2021-06-28 DIAGNOSIS — O09.529 HIGH-RISK PREGNANCY, ELDERLY MULTIGRAVIDA, UNSPECIFIED TRIMESTER: ICD-10-CM

## 2021-06-28 PROCEDURE — 99207 PR PRENATAL VISIT: CPT | Performed by: OBSTETRICS & GYNECOLOGY

## 2021-06-28 ASSESSMENT — MIFFLIN-ST. JEOR: SCORE: 1621.67

## 2021-06-28 NOTE — PROGRESS NOTES
35 year old  at 24w4d     - O+/RI.  FOB Miguel.  NIPT nl XY.  GCT and TDaP next visit  - PTSD/AYDIN on citalopram.    - AMA: NIPT nl, normal level 2 US  - BMI 36, early GCT nl    RTC 4 wks     Diana Sanders MD, MPH  [facility]

## 2021-06-28 NOTE — NURSING NOTE
"24w4d    Chief Complaint   Patient presents with     Prenatal Care       Initial /70   Ht 1.6 m (5' 3\")   Wt 95.8 kg (211 lb 1.6 oz)   LMP 2021   BMI 37.39 kg/m   Estimated body mass index is 37.39 kg/m  as calculated from the following:    Height as of this encounter: 1.6 m (5' 3\").    Weight as of this encounter: 95.8 kg (211 lb 1.6 oz).  BP completed using cuff size: regular    Questioned patient about current smoking habits.  Pt. has never smoked.          The following HM Due: NONE               "

## 2021-07-26 ENCOUNTER — PRENATAL OFFICE VISIT (OUTPATIENT)
Dept: OBGYN | Facility: CLINIC | Age: 36
End: 2021-07-26
Payer: COMMERCIAL

## 2021-07-26 VITALS
WEIGHT: 215 LBS | DIASTOLIC BLOOD PRESSURE: 78 MMHG | SYSTOLIC BLOOD PRESSURE: 120 MMHG | HEIGHT: 63 IN | BODY MASS INDEX: 38.09 KG/M2

## 2021-07-26 DIAGNOSIS — Z3A.28 28 WEEKS GESTATION OF PREGNANCY: Primary | ICD-10-CM

## 2021-07-26 LAB
ERYTHROCYTE [DISTWIDTH] IN BLOOD BY AUTOMATED COUNT: 13.8 % (ref 10–15)
HCT VFR BLD AUTO: 34.8 % (ref 35–47)
HGB BLD-MCNC: 11.1 G/DL (ref 11.7–15.7)
MCH RBC QN AUTO: 27.5 PG (ref 26.5–33)
MCHC RBC AUTO-ENTMCNC: 31.9 G/DL (ref 31.5–36.5)
MCV RBC AUTO: 86 FL (ref 78–100)
PLATELET # BLD AUTO: 212 10E3/UL (ref 150–450)
RBC # BLD AUTO: 4.03 10E6/UL (ref 3.8–5.2)
WBC # BLD AUTO: 7.8 10E3/UL (ref 4–11)

## 2021-07-26 PROCEDURE — 82951 GLUCOSE TOLERANCE TEST (GTT): CPT | Performed by: OBSTETRICS & GYNECOLOGY

## 2021-07-26 PROCEDURE — 85027 COMPLETE CBC AUTOMATED: CPT | Performed by: OBSTETRICS & GYNECOLOGY

## 2021-07-26 PROCEDURE — 99207 PR PRENATAL VISIT: CPT | Performed by: OBSTETRICS & GYNECOLOGY

## 2021-07-26 PROCEDURE — 36415 COLL VENOUS BLD VENIPUNCTURE: CPT | Performed by: OBSTETRICS & GYNECOLOGY

## 2021-07-26 PROCEDURE — 86780 TREPONEMA PALLIDUM: CPT | Performed by: OBSTETRICS & GYNECOLOGY

## 2021-07-26 ASSESSMENT — MIFFLIN-ST. JEOR: SCORE: 1639.36

## 2021-07-26 NOTE — NURSING NOTE
"Chief Complaint   Patient presents with     Prenatal Care     28 4/7 weeks       Initial /78 (BP Location: Left arm, Patient Position: Chair, Cuff Size: Adult Large)   Ht 1.6 m (5' 3\")   Wt 97.5 kg (215 lb)   LMP 2021   Breastfeeding No   BMI 38.09 kg/m   Estimated body mass index is 38.09 kg/m  as calculated from the following:    Height as of this encounter: 1.6 m (5' 3\").    Weight as of this encounter: 97.5 kg (215 lb).  BP completed using cuff size: large    Questioned patient about current smoking habits.  Pt. has never smoked.          The following HM Due: NONE    +fetal movement  -swelling    Fartun Lee, CMA    "

## 2021-07-26 NOTE — PROGRESS NOTES
35 year old  at 28w4d     - O+/RI.  FOB Miguel.  NIPT nl XY.  GCT today, declines TDaP.  Gave TDaP info sheet.  Hoping to breastfeed, it didn't work out last time but she already has a breast pump.  Probably will use condoms for PPBC.   - PTSD/AYDIN on citalopram.  wondering about other coping mechanisms/strategies to avoid anxiety spikes during L&D.  Discussed , ativan, pain control.    - AMA: NIPT nl, normal level 2 US  - BMI 36, early GCT nl    RTC 2 wks     Diana Sanders MD, MPH  [facility]

## 2021-07-27 DIAGNOSIS — R73.09 ABNORMAL GLUCOSE TOLERANCE TEST: Primary | ICD-10-CM

## 2021-07-27 LAB
GLUCOSE 1H P 50 G GLC PO SERPL-MCNC: 132 MG/DL (ref 70–129)
T PALLIDUM AB SER QL: NONREACTIVE

## 2021-07-29 ENCOUNTER — LAB (OUTPATIENT)
Dept: LAB | Facility: CLINIC | Age: 36
End: 2021-07-29
Payer: COMMERCIAL

## 2021-07-29 DIAGNOSIS — R73.09 ABNORMAL GLUCOSE TOLERANCE TEST: ICD-10-CM

## 2021-07-29 PROCEDURE — 82951 GLUCOSE TOLERANCE TEST (GTT): CPT

## 2021-07-29 PROCEDURE — 82952 GTT-ADDED SAMPLES: CPT

## 2021-07-29 PROCEDURE — 36415 COLL VENOUS BLD VENIPUNCTURE: CPT

## 2021-07-30 LAB
GESTATIONAL GTT 1 HR POST DOSE: 144 MG/DL (ref 60–179)
GESTATIONAL GTT 2 HR POST DOSE: 112 MG/DL (ref 60–154)
GESTATIONAL GTT 3 HR POST DOSE: 103 MG/DL (ref 60–139)
GLUCOSE P FAST SERPL-MCNC: 74 MG/DL (ref 60–94)

## 2021-08-12 ENCOUNTER — PRENATAL OFFICE VISIT (OUTPATIENT)
Dept: OBGYN | Facility: CLINIC | Age: 36
End: 2021-08-12
Payer: COMMERCIAL

## 2021-08-12 VITALS
BODY MASS INDEX: 38.48 KG/M2 | HEIGHT: 63 IN | WEIGHT: 217.2 LBS | DIASTOLIC BLOOD PRESSURE: 60 MMHG | SYSTOLIC BLOOD PRESSURE: 104 MMHG

## 2021-08-12 DIAGNOSIS — O09.529 HIGH-RISK PREGNANCY, ELDERLY MULTIGRAVIDA, UNSPECIFIED TRIMESTER: Primary | ICD-10-CM

## 2021-08-12 PROCEDURE — 99207 PR PRENATAL VISIT: CPT | Performed by: FAMILY MEDICINE

## 2021-08-12 RX ORDER — FERROUS SULFATE 325(65) MG
325 TABLET ORAL
COMMUNITY
End: 2023-08-18

## 2021-08-12 ASSESSMENT — MIFFLIN-ST. JEOR: SCORE: 1644.34

## 2021-08-12 NOTE — PATIENT INSTRUCTIONS
"Return 2 weeks   Return to clinic:  every 4 weeks till 28 weeks, then every 2 weeks till 36 weeks, then weekly till delivery      Phone numbers [place]:  Day/ night [phone] ask for ob triage  Emergency:  Call labor and delivery:  [phone]    What should I call about??    Contraction every 5 minutes for 1 hour 1 minute long (511), bleeding, loss of fluid, headache that doesn't resolve with tylenol, and decreased fetal movement     Start kick counts @ 26-28 weeks   There is an sarita for this!  It is called \"count the kicks\"  Keep track of movement and discover your normal baby movement pattern   guideline is listed below  Please call if you do not feel the baby move!  We will have you come in for fetal heart rate monitoring:   Perception of at least 10 FMs during 12 hours of normal maternal activity   Perception of least 10 FMs over two hours when the mother is at rest and focused on [facility] Address   [address], [place], [place] [address] [phone]    Dr. Kaci Betts, DO    OB/GYN   [facility]                                                      "

## 2021-08-12 NOTE — PROGRESS NOTES
"CC: Here for routine prenatal visit   36 year old y/o  @ 31w0d with Estimated Date of Delivery: Oct 14, 2021     /60   Ht 1.6 m (5' 3\")   Wt 98.5 kg (217 lb 3.2 oz)   LMP 2021   BMI 38.48 kg/m    See OB flowsheet  + fetal movement, no contractions, no bleeding, no loss of fluid   Discussed monitoring fetal movement     1) concerns: none  Covid-19 concerns: covid infection and vaccination recommendations discussed.   2) Routine: Blood type O+ RI tdap [no] flu [ ] GS [ ] NIPT [neg] AFP [not done] XY  Covid v [ ] gtt [nl]  3) Risk factors:   A: AMA: level II us  B: AYDIN: citalopram  C: BMI 36 early gct nl  4) Return: 2 weeks     [place]     "

## 2021-08-23 ENCOUNTER — PRENATAL OFFICE VISIT (OUTPATIENT)
Dept: OBGYN | Facility: CLINIC | Age: 36
End: 2021-08-23
Payer: COMMERCIAL

## 2021-08-23 VITALS
BODY MASS INDEX: 38.98 KG/M2 | HEIGHT: 63 IN | DIASTOLIC BLOOD PRESSURE: 70 MMHG | SYSTOLIC BLOOD PRESSURE: 100 MMHG | WEIGHT: 220 LBS

## 2021-08-23 DIAGNOSIS — Z34.80 SUPERVISION OF OTHER NORMAL PREGNANCY, ANTEPARTUM: Primary | ICD-10-CM

## 2021-08-23 PROCEDURE — 99207 PR PRENATAL VISIT: CPT | Performed by: OBSTETRICS & GYNECOLOGY

## 2021-08-23 ASSESSMENT — MIFFLIN-ST. JEOR: SCORE: 1657.04

## 2021-08-23 NOTE — PROGRESS NOTES
36 year old  at 32w4d     - O+/RI.  FOB Miguel.  NIPT nl XY.  Declines TDaP.    - PTSD/AYDIN on citalopram.    - AMA: NIPT nl, normal level 2 US  - BMI 36, early GCT nl    RTC 2 wks     Diana Sanders MD, MPH  [facility]

## 2021-08-23 NOTE — NURSING NOTE
"Chief Complaint   Patient presents with     Prenatal Care     32 3/7 weeks       Initial /70 (BP Location: Left arm, Patient Position: Chair, Cuff Size: Adult Regular)   Ht 1.6 m (5' 3\")   Wt 99.8 kg (220 lb)   LMP 2021   Breastfeeding No   BMI 38.97 kg/m   Estimated body mass index is 38.97 kg/m  as calculated from the following:    Height as of this encounter: 1.6 m (5' 3\").    Weight as of this encounter: 99.8 kg (220 lb).  BP completed using cuff size: regular    Questioned patient about current smoking habits.  Pt. has never smoked.          The following HM Due: NONE    +fetal movement  -swelling    Fartun Lee, CMA    "

## 2021-09-10 ENCOUNTER — PRENATAL OFFICE VISIT (OUTPATIENT)
Dept: OBGYN | Facility: CLINIC | Age: 36
End: 2021-09-10
Payer: COMMERCIAL

## 2021-09-10 VITALS — BODY MASS INDEX: 39.33 KG/M2 | SYSTOLIC BLOOD PRESSURE: 112 MMHG | DIASTOLIC BLOOD PRESSURE: 72 MMHG | WEIGHT: 222 LBS

## 2021-09-10 DIAGNOSIS — Z34.80 SUPERVISION OF OTHER NORMAL PREGNANCY, ANTEPARTUM: Primary | ICD-10-CM

## 2021-09-10 PROCEDURE — 99207 PR PRENATAL VISIT: CPT | Performed by: OBSTETRICS & GYNECOLOGY

## 2021-09-10 NOTE — PROGRESS NOTES
36 year old  at 35w1d     - O+/RI.  FOB Miguel.  NIPT nl XY.  Declines TDaP and covid vaccine, too nervous about it.  GBS and cvx chk next visit  - PTSD/AYDIN on citalopram.    - AMA: NIPT nl, normal level 2 US  - BMI 36, early GCT nl    RTC weekly until delivery     Diana Sanders MD, MPH  [facility]

## 2021-09-10 NOTE — NURSING NOTE
"Chief Complaint   Patient presents with     Prenatal Care     35 weeks 1 day- no concerns        Initial /72 (BP Location: Right arm, Patient Position: Sitting, Cuff Size: Adult Regular)   Wt 100.7 kg (222 lb)   LMP 2021   BMI 39.33 kg/m   Estimated body mass index is 39.33 kg/m  as calculated from the following:    Height as of 21: 1.6 m (5' 3\").    Weight as of this encounter: 100.7 kg (222 lb).  BP completed using cuff size: large    Questioned patient about current smoking habits.  Pt. has never smoked.          The following HM Due: NONE    35w1d  Kenroy Soto CMA                "

## 2021-09-20 ENCOUNTER — PRENATAL OFFICE VISIT (OUTPATIENT)
Dept: OBGYN | Facility: CLINIC | Age: 36
End: 2021-09-20
Payer: COMMERCIAL

## 2021-09-20 VITALS — DIASTOLIC BLOOD PRESSURE: 76 MMHG | WEIGHT: 224 LBS | BODY MASS INDEX: 39.68 KG/M2 | SYSTOLIC BLOOD PRESSURE: 120 MMHG

## 2021-09-20 DIAGNOSIS — Z34.93 PRENATAL CARE IN THIRD TRIMESTER: Primary | ICD-10-CM

## 2021-09-20 PROCEDURE — 87653 STREP B DNA AMP PROBE: CPT | Performed by: ADVANCED PRACTICE MIDWIFE

## 2021-09-20 PROCEDURE — 99207 PR PRENATAL VISIT: CPT | Performed by: ADVANCED PRACTICE MIDWIFE

## 2021-09-20 NOTE — NURSING NOTE
"Chief Complaint   Patient presents with     Prenatal Care     36w 4d       Initial /76   Wt 101.6 kg (224 lb)   LMP 2021   BMI 39.68 kg/m   Estimated body mass index is 39.68 kg/m  as calculated from the following:    Height as of 21: 1.6 m (5' 3\").    Weight as of this encounter: 101.6 kg (224 lb).  BP completed using cuff size: regular    Questioned patient about current smoking habits.  Pt. has never smoked.          Serina Mc, [facility]              "

## 2021-09-20 NOTE — PROGRESS NOTES
Feeling well.  Baby is active. Denies any leaking of fluid, vaginal bleeding, regular uterine contractions, or headaches or other concerns.  GBS explained and collected today.  Cervix checked with her consent.    Reviewed to call for contractions, loss of fluid, vaginal bleeding, decreased fetal movement or any other questions or concerns.    RTC in 1 weeks.  Corry Lawrence, JULIO, APRN, CNM

## 2021-09-22 LAB
GP B STREP DNA SPEC QL NAA+PROBE: NEGATIVE
PATIENT PENICILLIN, AMOXICILLIN, CEPHALOSPORINS ALLERGY: YES

## 2021-09-23 ENCOUNTER — OFFICE VISIT (OUTPATIENT)
Dept: OPTOMETRY | Facility: CLINIC | Age: 36
End: 2021-09-23
Payer: COMMERCIAL

## 2021-09-23 DIAGNOSIS — H52.13 MYOPIA OF BOTH EYES: Primary | ICD-10-CM

## 2021-09-23 DIAGNOSIS — H02.889 MEIBOMIAN GLAND DYSFUNCTION: ICD-10-CM

## 2021-09-23 PROCEDURE — 92015 DETERMINE REFRACTIVE STATE: CPT | Performed by: OPTOMETRIST

## 2021-09-23 PROCEDURE — 92310 CONTACT LENS FITTING OU: CPT | Mod: GA | Performed by: OPTOMETRIST

## 2021-09-23 PROCEDURE — 92004 COMPRE OPH EXAM NEW PT 1/>: CPT | Performed by: OPTOMETRIST

## 2021-09-23 ASSESSMENT — VISUAL ACUITY
CORRECTION_TYPE: GLASSES
OS_CC: 20/20
OD_CC: 20/20
OS_CC: 20/20
OS_SC: CF @ 3'
OD_CC: 20/20
METHOD: SNELLEN - LINEAR
OD_SC: CF @ 3'

## 2021-09-23 ASSESSMENT — REFRACTION_MANIFEST
OS_SPHERE: -6.50
OD_CYLINDER: SPHERE
OS_CYLINDER: SPHERE
OD_SPHERE: -6.25
OS_CYLINDER: +0.50
METHOD_AUTOREFRACTION: 1
OD_SPHERE: -7.00
OD_CYLINDER: +1.00
OS_AXIS: 037
OD_AXIS: 179
OS_SPHERE: -6.25

## 2021-09-23 ASSESSMENT — EXTERNAL EXAM - LEFT EYE: OS_EXAM: NORMAL

## 2021-09-23 ASSESSMENT — REFRACTION_WEARINGRX
OD_AXIS: 020
OS_SPHERE: -6.50
SPECS_TYPE: SVL
OS_CYLINDER: SPHERE
OD_SPHERE: -6.00
OD_CYLINDER: +0.25

## 2021-09-23 ASSESSMENT — KERATOMETRY
OS_AXISANGLE_DEGREES: 78
METHOD_AUTO_MANUAL: AUTOMATED
OS_K1POWER_DIOPTERS: 43.87
OS_K2POWER_DIOPTERS: 44.75
OD_K2POWER_DIOPTERS: 45.25
OD_AXISANGLE_DEGREES: 94
OS_AXISANGLE2_DEGREES: 168
OD_AXISANGLE2_DEGREES: 4
OD_K1POWER_DIOPTERS: 44.25

## 2021-09-23 ASSESSMENT — CONF VISUAL FIELD
OD_NORMAL: 1
METHOD: COUNTING FINGERS
OS_NORMAL: 1

## 2021-09-23 ASSESSMENT — REFRACTION_CURRENTRX
OD_SPHERE: -6.00
OD_BRAND: J&J ACUVUE OASYS BC 8.4, D 14.0
OS_SPHERE: -6.00
OS_BRAND: J&J ACUVUE OASYS BC 8.4, D 14.0

## 2021-09-23 ASSESSMENT — TONOMETRY
OS_IOP_MMHG: 15
OD_IOP_MMHG: 15
IOP_METHOD: APPLANATION

## 2021-09-23 ASSESSMENT — CUP TO DISC RATIO
OD_RATIO: 0.3
OS_RATIO: 0.3

## 2021-09-23 ASSESSMENT — EXTERNAL EXAM - RIGHT EYE: OD_EXAM: NORMAL

## 2021-09-23 NOTE — PATIENT INSTRUCTIONS
You may use rewetting drops such as blink or refresh contacts over lenses   and artificial tears when lenses are out    There are over the counter drops that work well and may be used up to 4 x daily when lenses are out if your eyes are dry. ( systane , refresh, thera tears) If you need more than 4 drops daily, use a preservative free product which come in individual vials and may be used for 24 hours until finished and discarded.     If you are in not in dailies, consider clear care solution if still having problems and reduce wear time to 10-12 hours   Clear care will also deep clean/ and disinfect lenses better but must sit in the case that includes a disc for 6 hours to neutralize the peroxide solution before wear.   Make sure to read product directions , this can not go directly in the eye or be used as a rinse for your lenses.     Once your contact lens prescription is finalized and you are not having any problems with the trials or current lenses you can order your supply of lenses.   You can order your contact lenses online at [email or web address].  Click on services at the top of the page, then eye care and you will see the link to order contact lenses.  You can also order contact lenses at [phone]. There is no shipping fee if you order an annual supply otherwise  be sure to let them know which office you would like to  the lenses, Kimi [phone].    Meibomian gland dysfunction or Posterior Blepharitis, is characterized by inflammation along both the uppper and lower eyelid margins. A single row of these glands is present in each lid with openings along the lid margins.  It is often found in association with skin conditions such as rosacea and seborrheic dermatitis.    Symptoms include:  ?Red eyes  ?Gritty or burning sensation  ?Excessive tearing  ?Itchy eyelids  ?Red, swollen eyelids  ?Crusting or matting of eyelashes in the morning  ?Light sensitivity  ?Blurred vision    It is important to  keep cosmetics from blocking these oil glands. If blocked, they do not   excrete oil into the tear film, which causes the tears to evaporate quickly.   This may result in watery eyes.  There is also an increase of bacterial growth when the tear film is unstable, leading to further ocular surface inflammation.    Treatment:  1. Warm compresses for 5-10 minutes twice daily     2.  Keep the eyelid margins clean by using a commercial eye scrub or mild baby shampoo on a washcloth 1-2x daily    3. Use preservative free artificial tears 4-8x daily     For warm compresses    Moisten a washcloth with hot water, or microwave for 10 seconds, being careful to not get the cloth too hot.   Then put the washcloth onto your eyelids for 5 minutes. It will cool quickly so a rice pack or eyemask that can be heated and laid on top of the washcloth will help retain the heat.    Omega 3 fatty acid supplements taken once to twice daily and artificial tears such as Soothe xp, Refresh optive , Retaine and systane balance are also an additional treatment to control inflammation and help soothe your eyes.

## 2021-09-23 NOTE — LETTER
9/23/2021         RE: Alexi Montemayor  [id number] [facility] [address]        Dear Colleague,    Thank you for referring your patient, Alexi Montemayor, [facility]. Please see a copy of my visit note below.    Chief Complaint   Patient presents with     Annual Eye Exam     Contact Lens Evaluation     Stable vision  Updated cls and srx in 2019.  Currently pregnant - due in 2 1/2 weeks  No other concerns at this time    Previous contact lens wearer? Yes: Oasys  Comfort of contact lenses : Good  Satisfied with current lenses: Yes.        Last Eye Exam: 2019, here.  Dilated Previously: Yes - NO DILATION TODAY    What are you currently using to see?  glasses and contacts    Distance Vision Acuity: Satisfied with vision    Near Vision Acuity: Satisfied with vision while reading and using computer with glasses    Eye Comfort: good  Do you use eye drops? 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       OBJECTIVE: See Ophthalmology exam    ASSESSMENT:    ICD-10-CM    1. Myopia of both eyes  H52.13       PLAN:   Update prescription and dispensed lenses     Radha Soto OD       Again, thank you for allowing me to participate in the care of your patient.        Sincerely,        Radha Soto, OD

## 2021-09-23 NOTE — PROGRESS NOTES
Chief Complaint   Patient presents with     Annual Eye Exam     Contact Lens Evaluation     Stable vision  Updated cls and srx in 2019.  Currently pregnant - due in 2 1/2 weeks  No other concerns at this time    Previous contact lens wearer? Yes: Oasys  Comfort of contact lenses : Good  Satisfied with current lenses: Yes.        Last Eye Exam: 2019, here.  Dilated Previously: Yes - NO DILATION TODAY    What are you currently using to see?  glasses and contacts    Distance Vision Acuity: Satisfied with vision    Near Vision Acuity: Satisfied with vision while reading and using computer with glasses    Eye Comfort: good  Do you use eye drops? 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       OBJECTIVE: See Ophthalmology exam    ASSESSMENT:    ICD-10-CM    1. Myopia of both eyes  H52.13       PLAN:   Update prescription and dispensed lenses     Radha Soto OD

## 2021-09-28 ENCOUNTER — PRENATAL OFFICE VISIT (OUTPATIENT)
Dept: OBGYN | Facility: CLINIC | Age: 36
End: 2021-09-28
Payer: COMMERCIAL

## 2021-09-28 VITALS — SYSTOLIC BLOOD PRESSURE: 114 MMHG | WEIGHT: 225.1 LBS | DIASTOLIC BLOOD PRESSURE: 72 MMHG | BODY MASS INDEX: 39.87 KG/M2

## 2021-09-28 DIAGNOSIS — Z34.93 PRENATAL CARE IN THIRD TRIMESTER: Primary | ICD-10-CM

## 2021-09-28 PROCEDURE — 99207 PR PRENATAL VISIT: CPT | Performed by: OBSTETRICS & GYNECOLOGY

## 2021-09-28 NOTE — PROGRESS NOTES
36 year old  at 37w5d     - O+/RI.  FOB Miguel.  NIPT nl XY.  Declines TDaP and covid vaccine, too nervous about it.  GBS neg.  Interested in intermittent FMLA prior to delivery, really starting to slow down.   - PTSD/AYDIN on citalopram.      RTC weekly until delivery     Diana Sanders MD, MPH  [facility]

## 2021-09-28 NOTE — NURSING NOTE
"Chief Complaint   Patient presents with     Prenatal Care     37 weeks 5 days, no c/o VB, LoF, cramping/contractions. Feeling FM daily.      Flu Shot     declines       Initial /72   Wt 102.1 kg (225 lb 1.6 oz)   LMP 2021   BMI 39.87 kg/m   Estimated body mass index is 39.87 kg/m  as calculated from the following:    Height as of 21: 1.6 m (5' 3\").    Weight as of this encounter: 102.1 kg (225 lb 1.6 oz).  BP completed using cuff size: regular    Questioned patient about current smoking habits.  Pt. has never smoked.          The following HM Due: NONE      Yvonne Haas CMA             "

## 2021-09-29 ENCOUNTER — MYC MEDICAL ADVICE (OUTPATIENT)
Dept: OBGYN | Facility: CLINIC | Age: 36
End: 2021-09-29

## 2021-09-29 NOTE — TELEPHONE ENCOUNTER
refaxed with reduced hours 20 25 a week (per pt)  Fartun Lee CMA      I called Alexi and asked her to call me regarding paperwork  Fartun Lee CMA

## 2021-09-29 NOTE — TELEPHONE ENCOUNTER
Please see [employer] message regarding paperwork from yesterday.    Gisselle WORRELL R.N.

## 2021-09-30 ENCOUNTER — MYC MEDICAL ADVICE (OUTPATIENT)
Dept: OBGYN | Facility: CLINIC | Age: 36
End: 2021-09-30

## 2021-10-03 ENCOUNTER — HEALTH MAINTENANCE LETTER (OUTPATIENT)
Age: 36
End: 2021-10-03

## 2021-10-04 ENCOUNTER — PRENATAL OFFICE VISIT (OUTPATIENT)
Dept: OBGYN | Facility: CLINIC | Age: 36
End: 2021-10-04
Payer: COMMERCIAL

## 2021-10-04 VITALS
DIASTOLIC BLOOD PRESSURE: 78 MMHG | WEIGHT: 226 LBS | SYSTOLIC BLOOD PRESSURE: 126 MMHG | BODY MASS INDEX: 40.04 KG/M2 | HEIGHT: 63 IN

## 2021-10-04 DIAGNOSIS — Z34.93 PRENATAL CARE IN THIRD TRIMESTER: Primary | ICD-10-CM

## 2021-10-04 PROCEDURE — 99207 PR PRENATAL VISIT: CPT | Performed by: OBSTETRICS & GYNECOLOGY

## 2021-10-04 ASSESSMENT — MIFFLIN-ST. JEOR: SCORE: 1684.26

## 2021-10-04 NOTE — NURSING NOTE
"Chief Complaint   Patient presents with     Prenatal Care     38 4/7 weeks       Initial /78 (BP Location: Right arm, Patient Position: Chair, Cuff Size: Adult Regular)   Ht 1.6 m (5' 3\")   Wt 102.5 kg (226 lb)   LMP 2021   Breastfeeding No   BMI 40.03 kg/m   Estimated body mass index is 40.03 kg/m  as calculated from the following:    Height as of this encounter: 1.6 m (5' 3\").    Weight as of this encounter: 102.5 kg (226 lb).  BP completed using cuff size: regular    Questioned patient about current smoking habits.  Pt. has never smoked.          The following HM Due: NONE    +fetal movement  -swelling    Fartun Lee, CMA    "

## 2021-10-04 NOTE — PROGRESS NOTES
36 year old  at 38w4d     Reviewed s/s labor, IOL after 41 wks for PD    RTC weekly until delivery     Diana Sanders MD, MPH  [facility]

## 2021-10-07 ENCOUNTER — NURSE TRIAGE (OUTPATIENT)
Dept: NURSING | Facility: CLINIC | Age: 36
End: 2021-10-07

## 2021-10-07 ENCOUNTER — HOSPITAL ENCOUNTER (OUTPATIENT)
Facility: CLINIC | Age: 36
Discharge: HOME | End: 2021-10-07
Attending: OBSTETRICS & GYNECOLOGY | Admitting: OBSTETRICS & GYNECOLOGY
Payer: COMMERCIAL

## 2021-10-07 ENCOUNTER — HOSPITAL ENCOUNTER (OUTPATIENT)
Facility: CLINIC | Age: 36
Discharge: HOME | End: 2021-10-07
Attending: FAMILY MEDICINE | Admitting: FAMILY MEDICINE
Payer: COMMERCIAL

## 2021-10-07 VITALS
SYSTOLIC BLOOD PRESSURE: 120 MMHG | OXYGEN SATURATION: 96 % | RESPIRATION RATE: 18 BRPM | DIASTOLIC BLOOD PRESSURE: 80 MMHG | TEMPERATURE: 97.9 F | WEIGHT: 226 LBS | HEIGHT: 63 IN | BODY MASS INDEX: 40.04 KG/M2

## 2021-10-07 VITALS — TEMPERATURE: 98.3 F | DIASTOLIC BLOOD PRESSURE: 83 MMHG | SYSTOLIC BLOOD PRESSURE: 135 MMHG | RESPIRATION RATE: 16 BRPM

## 2021-10-07 PROBLEM — Z36.89 ENCOUNTER FOR TRIAGE IN PREGNANT PATIENT: Status: ACTIVE | Noted: 2021-10-07

## 2021-10-07 PROCEDURE — G0463 HOSPITAL OUTPT CLINIC VISIT: HCPCS

## 2021-10-07 PROCEDURE — 99212 OFFICE O/P EST SF 10 MIN: CPT | Performed by: FAMILY MEDICINE

## 2021-10-07 RX ORDER — LIDOCAINE 40 MG/G
CREAM TOPICAL
Status: DISCONTINUED | OUTPATIENT
Start: 2021-10-07 | End: 2021-10-07 | Stop reason: HOSPADM

## 2021-10-07 ASSESSMENT — MIFFLIN-ST. JEOR: SCORE: 1684.26

## 2021-10-07 ASSESSMENT — ACTIVITIES OF DAILY LIVING (ADL)
FALL_HISTORY_WITHIN_LAST_SIX_MONTHS: NO
TOILETING_ISSUES: NO

## 2021-10-07 NOTE — DISCHARGE INSTRUCTIONS
Discharge Instruction for Undelivered Patients      You were seen for: Labor Assessment  We Consulted: Dr Betts  You had (Test or Medicine):     Diet:   Drink 8 to 12 glasses of liquids (milk, juice, water) every day.  You may eat meals and snacks.     Activity:  Call your doctor or nurse midwife if your baby is moving less than usual.     Call your provider if you notice:  Swelling in your face or increased swelling in your hands or legs.  Headaches that are not relieved by Tylenol (acetaminophen).  Changes in your vision (blurring: seeing spots or stars.)  Nausea (sick to your stomach) and vomiting (throwing up).   Weight gain of 5 pounds or more per week.  Heartburn that doesn't go away.  Signs of bladder infection: pain when you urinate (use the toilet), need to go more often and more urgently.  The bag of martinez (rupture of membranes) breaks, or you notice leaking in your underwear.  Bright red blood in your underwear.  Abdominal (lower belly) or stomach pain.  Second (plus) baby: Contractions (tightening) less than 10 minutes apart and getting stronger.  *If less than 34 weeks: Contractions (tightening) more than 6 times in one hour.  Increase or change in vaginal discharge (note the color and amount)  Other: If any concerns or changes occur, return to L&D    Follow-up:  As scheduled in the clinic

## 2021-10-07 NOTE — PROVIDER NOTIFICATION
10/07/21 0236   Provider Notification   Provider Name/Title Dr. Camargo   Method of Notification Phone   Notification Reason Other (Comment)     OB updated. Baby very active and difficult to keep on monitor. Monitor held for 15 minutes. It sounds like baby is having accels, but tracing ok broken. Variability is much better than on arrival. Discharge order received.

## 2021-10-07 NOTE — TELEPHONE ENCOUNTER
Patient is currently 39 weeks pregnant and due date is 10/14/2021 and is second child.  OB MD is Diana Sanders and seen at women's clinic in [place].   Patient went into ED [facility] last night at midnight.  Patient was having contractions and was not dialated.  Told to return when the contractions are stronger.  Currently is having light bleeding and feels she lost her mucus plug.  Patient is having contractions and are about eight minutes apart.  Contractions just started.      OB Triage Call      Is patient's OB/Midwife with the [facility] or [facility]? [facility]- Proceed with triage   Patient is currently 39 weeks pregnant and due date is 10/14/2021 and is second child.  OB MD is Diana Sanders and seen at women's clinic in [place].   Patient went into ED [facility] last night at midnight.  Patient was having contractions and was not dialated.  Told to return when the contractions are stronger.  Currently is having light bleeding and feels she lost her mucus plug.  Patient is having contractions and are about eight minutes apart.  Contractions just started.  Slight bloody show when wiping and in toilet and in underware.  Patient is starting to become real uncomfortable at home and bag of martinez has not broke.      Reason for call: Contractions and lost mucus plug    Assessment: Go to L & D    Plan: Patient going to [facility]    Patient plans to deliver at [facility]     Patient's primary OB Provider is Raysa Patel.      Per protocol recommendations Patient to be evaluated in L&D. Patient's primary OB is Fredy Physician.  Labor and delivery at [facility] ([phone]) notified of patient's pending arrival.  Report given to Oanh .    Is patient's delivering hospital on divert? No      39w0d    Estimated Date of Delivery: Oct 14, 2021        OB History    Para Term  AB Living   2 1 1 0 0 1   SAB TAB Ectopic Multiple Live Births   0 0 0 0 1      # Outcome Date GA Lbr Ronnie/2nd Weight Sex  "Delivery Anes PTL Lv   2 Current            1 Term 07/21/06 40w0d  3.175 kg (7 lb) M Vag-Spont None  LUIS FERNANDO      Name: Edd       No results found for: GBS       Rachele Badillo RN 10/07/21 6:38 AM  [facility] Nurse Advisor    Reason for Disposition    Vaginal bleeding or spotting    Additional Information    Negative: Passed out (i.e., lost consciousness, collapsed and was not responding)    Negative: Shock suspected (e.g., cold/pale/clammy skin, too weak to stand, low BP, rapid pulse)    Negative: Difficult to awaken or acting confused (e.g., disoriented, slurred speech)    Negative: [1] SEVERE abdominal pain (e.g., excruciating) AND [2] constant AND [3] present > 1 hour    Negative: Severe bleeding (e.g., continuous red blood from vagina, or large blood clots)    Negative: Umbilical cord hanging out of the vagina (shiny, white, curled appearance, \"like telephone cord\")    Negative: Uncontrollable urge to push (i.e., feels like baby is coming out now)    Negative: Can see baby    Negative: Sounds like a life-threatening emergency to the triager    Negative: [1] First baby (primipara) AND [2] contractions < 6 minutes apart  AND [3] present 2 hours    Negative: [1] History of prior delivery (multipara) AND [2] contractions < 10 minutes apart AND [3] present 1 hour    Negative: [1] History of rapid prior delivery AND [2] contractions < 10 minutes apart    Negative: [1] Leakage of fluid from vagina AND [2] green or brown in color    Negative: [1] Leakage of fluid from vagina AND [2] leakage started > 4 hours ago    Protocols used: PREGNANCY - LABOR-A-AH      "

## 2021-10-07 NOTE — PLAN OF CARE
Data: [facility] presented to [facility]: 10/7/2021  1:13 AM.  Reason for maternal/fetal assessment is uterine contractions. Patient reports contractions starting at , patient states contractions are getting closer together and are 10-15 minutes apart..  Patient is a .  Prenatal record reviewed. Pregnancy has been uncomplicated..  Gestational Age 39w0d. VSS. Fetal movement active. Patient denies leaking of vaginal fluid/rupture of membranes, vaginal bleeding, abdominal pain, pelvic pressure, nausea, vomiting, headache, visual disturbances, epigastric or URQ pain, significant edema. Support person is present.   Action: Verbal consent for EFM. Triage assessment completed. Bill of rights reviewed.  Response: Patient verbalized agreement with plan. Will contact Dr Luciano Camargo with update and for further orders.

## 2021-10-07 NOTE — PLAN OF CARE
Data: Patient assessed in the [facility] for uterine contractions.  Cervical exam closed.  Membranes intact.  Contractions/uterine assessment 2-4 minutes, mild.  Action:  Presumed adequate fetal oxygenation documented (see flow record). Discharge instructions reviewed.  Patient instructed to report change in fetal movement, vaginal leaking of fluid or bleeding, abdominal pain, or any concerns related to the pregnancy to her nurse/physician.    Response: Orders to discharge home per Luciano Camargo.  Patient verbalized understanding of education and verbalized agreement with plan. Discharged to home at 0243.

## 2021-10-07 NOTE — H&P
[facility] Labor and Delivery Triage Note    Alexi Montemayor MRN# [id number]   Age: 36 year old YOB: 1985     Date of Admission:  10/7/2021    Primary care provider: Judie Stern           Chief Complaint:   Alexi Montemayor is a 36 year old female who is  @ 39w0d pregnant and being seen for rule out labor, cervix changed from 0 to 1, category 1 tracing.  She is not in much pain and desires discharge home.  Risks, benefits and alternatives discussed, such as laboring in the hospital with monitoring vs going home.   She would like to go home.            Pregnancy history:     OBSTETRIC HISTORY:    OB History    Para Term  AB Living   2 1 1 0 0 1   SAB TAB Ectopic Multiple Live Births   0 0 0 0 1      # Outcome Date GA Lbr Ronnie/2nd Weight Sex Delivery Anes PTL Lv   2 Current            1 Term 06 40w0d  3.175 kg (7 lb) M Vag-Spont None  LUIS FERNANDO      Name: Edd       EDC: Estimated Date of Delivery: Oct 14, 2021    Prenatal Labs:   Lab Results   Component Value Date    ABO O 03/10/2021    RH Pos 03/10/2021    AS Neg 03/10/2021    HEPBANG Nonreactive 03/10/2021    HGB 11.1 (L) 2021       GBS Status:   No results found for: GBS    Active Problem List  Patient Active Problem List   Diagnosis     Anxiety     Major depressive disorder, recurrent episode, moderate with anxious distress (H)     Panic disorder without agoraphobia     Supervision of elderly primigravida in first trimester     Supervision of other normal pregnancy, antepartum     Encounter for triage in pregnant patient       Medication Prior to Admission  Medications Prior to Admission   Medication Sig Dispense Refill Last Dose     citalopram (CELEXA) 20 MG tablet Take 2 tablets (40 mg) by mouth daily  1      ferrous sulfate (FEROSUL) 325 (65 Fe) MG tablet Take 325 mg by mouth daily (with breakfast)        Prenatal Vit-Fe Fumarate-FA (PRENATAL MULTIVITAMIN W/IRON) 27-0.8 MG tablet Take 1 tablet by  "mouth daily 90 tablet 3    .        Maternal Past Medical History:     Past Medical History:   Diagnosis Date     Anxiety      Depression                        Family History:   This patient has no significant family history            Social History:   This patient has no significant social history         Review of Systems:   CONSTITUTIONAL: NEGATIVE for fever, chills, change in weight  INTEGUMENTARY/SKIN: NEGATIVE for worrisome rashes, moles or lesions  EYES: NEGATIVE for vision changes or irritation  ENT/MOUTH: NEGATIVE for ear, mouth and throat problems  RESP: NEGATIVE for significant cough or SOB  BREAST: NEGATIVE for masses, tenderness or discharge  CV: NEGATIVE for chest pain, palpitations or peripheral edema  GI: NEGATIVE for nausea, abdominal pain, heartburn, or change in bowel habits  : NEGATIVE for frequency, dysuria, or hematuria  MUSCULOSKELETAL: NEGATIVE for significant arthralgias or myalgia  NEURO: NEGATIVE for weakness, dizziness or paresthesias  ENDOCRINE: NEGATIVE for temperature intolerance, skin/hair changes  HEME: NEGATIVE for bleeding problems  PSYCHIATRIC: NEGATIVE for changes in mood or affect          Physical Exam:     Vitals were reviewed  Patient Vitals for the past 8 hrs:   BP Temp Temp src Resp SpO2 Height Weight   10/07/21 0730 120/80 97.9  F (36.6  C) Oral 18 96 % 1.6 m (5' 3\") 102.5 kg (226 lb)     Constitutional: Awake, alert, cooperative, no apparent distress, and appears stated age.  Eyes: Lids and lashes normal, pupils equal, round and reactive to light, extra ocular muscles intact, sclera clear, conjunctiva normal.  ENT: Normocephalic, without obvious abnormality, atramatic, sinuses nontender on palpation, external ears without lesions, oral pharynx with moist mucus membranes, tonsils without erythema or exudates, gums normal and good dentition.  Neck: Supple, symmetrical, trachea midline, no adenopathy, thyroid symmetric, not enlarged and no tenderness, skin " normal.  Hematologic / Lymphatic: No cervical lymphadenopathy and no supraclavicular lymphadenopathy.  Back: Symmetric, no curvature, spinous processes are non-tender on palpation, paraspinous muscles are non-tender on palpation, no costal vertebral tenderness.  Lungs: No increased work of breathing, good air exchange, clear to auscultation bilaterally, no crackles or wheezing.  Abdomen: Gravid   Musculoskeletal: No redness, warmth, or swelling of the joints.  Full range of motion noted.  Motor strength is 5 out of 5 all extremities bilaterally.  Tone is normal.  Neurologic: Awake, alert, oriented to name, place and time.  Cranial nerves II-XII are grossly intact.  Motor is 5 out of 5 bilaterally.  Cerebellar finger to nose, heel to shin intact.  Sensory is intact.  Babinski down going, Romberg negative, and gait is normal.  Neuropsychiatric: Normal affect, mood, orientation, memory and insight.  Skin: No rashes, erythema, pallor, petechia or purpura.   Cervix:   Membranes: intact   Dilation: 1   Effacement: 50%   Station:-1   Consistency: average   Position: Mid  Presentation:Cephalic  Fetal Heart Rate Tracing: Tier 1 (normal)  Tocometer: frequency q 4 minutes                       Assessment:   Alexi Montemayor is a 36 year old female who is  @ 39w0d pregnant and being seen for rule out labor, cervix changed from 0 to 1, category 1 tracing.  She is not in much pain and desires discharge home.  Risks, benefits and alternatives discussed, such as laboring in the hospital with monitoring vs going home.   She would like to go home.            Plan:   discharge home with precautions     Kaci Betts DO

## 2021-10-07 NOTE — PLAN OF CARE
Pt returned from ambulating. SVE 1/50/-3 station and  Posterior. Reports baby is active. Dr Betts came to the bedside. EFM tracing reviewed, discussed options to labor here in the hospital or discharge to home and if ctx increase, or water breaks or any concerns to return to L&D. Pt and her  wish to discharge to home and will come back if things change.

## 2021-10-07 NOTE — PLAN OF CARE
Data: Patient presented to [facility]: 10/7/2021  7:23 AM.  Reason for maternal/fetal assessment is uterine contractions, vaginal bleeding. Patient reports Contractions continued following discharge became more intense and more frequent. Noted bloody show on toilet tissue.  Patient is a .  Prenatal record reviewed. Pregnancy has been uncomplicated..  Gestational Age 39w0d. VSS. Fetal movement active. Patient denies leaking of vaginal fluid/rupture of membranes, abdominal pain, pelvic pressure, nausea, vomiting, headache, visual disturbances, epigastric or URQ pain, significant edema. Support person is present.   Action: Verbal consent for EFM. Triage assessment completed. Bill of rights reviewed.  Response: Patient verbalized agreement with plan. Will contact Dr Kaci Betts with update and for further orders.

## 2021-10-07 NOTE — PROVIDER NOTIFICATION
10/07/21 0149   Provider Notification   Provider Name/Title Dr. Camargo   Method of Notification Phone   Request Evaluate - Remote   Notification Reason Status Update;SVE     OB updated  here for r/o labor. 39w, uncomplicated pregnancy. First baby was 15 years ago. Contractions 2-4 minutes, patient states she feels cramping and every 10-15 minutes feels a stronger contraction. Minimal variability initially, position changed, now moderate variability, but no accels. SVE closed. OB would like patient to have juive. If reactive tracing, patient may discharge.

## 2021-10-07 NOTE — PLAN OF CARE
Dr Kaci Betts informed of patient arrival and assessment including the following:    Reason for maternal/fetal assessment uterine contractions, vaginal bleeding. Pt reports contractions continued after being discharged from L&D triage, becoming more frequent and more intense. SVE is closed and posterior. Fetal status normal baseline, moderate variability, accelerations present and no decelerations. Plan per provider/orders received for have pt ambulate and recheck SVE. Pt agrees with above and is walking around the L&D unit accompanied by her .

## 2021-10-07 NOTE — PROVIDER NOTIFICATION
10/07/21 0820   Provider Notification   Provider Name/Title Dr Betts   Method of Notification In Department   Comments   Comments EFM tracing reivewed. Plan is to have pt ambulate then recheck SVE. Discussed plan with pt and her . They agree to walk. Pt desire is for a natural labor.        10/07/21 0820   Provider Notification   Provider Name/Title Dr Betts   Method of Notification In Department   Comments   Comments EFM tracing reivewed. Plan is to have pt ambulate then recheck SVE. Discussed plan with pt and her . They agree to walk. Pt desire is for a natural labor.

## 2021-10-07 NOTE — DISCHARGE INSTRUCTIONS
Discharge Instruction for Undelivered Patients      You were seen for: Labor Assessment  We Consulted: Dr. Camargo  You had (Test or Medicine):fetal monitoring, cervical exam     Diet:   Drink 8 to 12 glasses of liquids (milk, juice, water) every day.  You may eat meals and snacks.     Activity:  Call your doctor or nurse midwife if your baby is moving less than usual.     Call your provider if you notice:  Swelling in your face or increased swelling in your hands or legs.  Headaches that are not relieved by Tylenol (acetaminophen).  Changes in your vision (blurring: seeing spots or stars.)  Nausea (sick to your stomach) and vomiting (throwing up).   Weight gain of 5 pounds or more per week.  Heartburn that doesn't go away.  Signs of bladder infection: pain when you urinate (use the toilet), need to go more often and more urgently.  The bag of martinez (rupture of membranes) breaks, or you notice leaking in your underwear.  Bright red blood in your underwear.  Abdominal (lower belly) or stomach pain.  For first baby: Contractions (tightening) less than 5 minutes apart for one hour or more.  Second (plus) baby: Contractions (tightening) less than 10 minutes apart and getting stronger.  *If less than 34 weeks: Contractions (tightening) more than 6 times in one hour.  Increase or change in vaginal discharge (note the color and amount)  Other:     Follow-up:  As scheduled in the clinic

## 2021-10-08 ENCOUNTER — HOSPITAL ENCOUNTER (INPATIENT)
Facility: CLINIC | Age: 36
LOS: 2 days | Discharge: HOME | End: 2021-10-10
Attending: FAMILY MEDICINE | Admitting: OBSTETRICS & GYNECOLOGY
Payer: COMMERCIAL

## 2021-10-08 LAB
ABO/RH(D): NORMAL
ANTIBODY SCREEN: NEGATIVE
BASOPHILS # BLD AUTO: 0 10E3/UL (ref 0–0.2)
BASOPHILS NFR BLD AUTO: 0 %
EOSINOPHIL # BLD AUTO: 0.1 10E3/UL (ref 0–0.7)
EOSINOPHIL NFR BLD AUTO: 1 %
ERYTHROCYTE [DISTWIDTH] IN BLOOD BY AUTOMATED COUNT: 14.6 % (ref 10–15)
HCT VFR BLD AUTO: 37.5 % (ref 35–47)
HGB BLD-MCNC: 11.7 G/DL (ref 11.7–15.7)
HGB BLD-MCNC: 11.7 G/DL (ref 11.7–15.7)
HOLD SPECIMEN: NORMAL
IMM GRANULOCYTES # BLD: 0.2 10E3/UL
IMM GRANULOCYTES NFR BLD: 2 %
LYMPHOCYTES # BLD AUTO: 1.7 10E3/UL (ref 0.8–5.3)
LYMPHOCYTES NFR BLD AUTO: 17 %
MCH RBC QN AUTO: 27.6 PG (ref 26.5–33)
MCHC RBC AUTO-ENTMCNC: 31.5 G/DL (ref 31.5–36.5)
MCV RBC AUTO: 88 FL (ref 78–100)
MONOCYTES # BLD AUTO: 0.7 10E3/UL (ref 0–1.3)
MONOCYTES NFR BLD AUTO: 7 %
NEUTROPHILS # BLD AUTO: 7.2 10E3/UL (ref 1.6–8.3)
NEUTROPHILS NFR BLD AUTO: 73 %
NRBC # BLD AUTO: 0 10E3/UL
NRBC BLD AUTO-RTO: 0 /100
PLATELET # BLD AUTO: 246 10E3/UL (ref 150–450)
RBC # BLD AUTO: 4.28 10E6/UL (ref 3.8–5.2)
SARS-COV-2 RNA RESP QL NAA+PROBE: NEGATIVE
SPECIMEN EXPIRATION DATE: NORMAL
T PALLIDUM AB SER QL: NONREACTIVE
WBC # BLD AUTO: 9.9 10E3/UL (ref 4–11)

## 2021-10-08 PROCEDURE — 722N000001 HC LABOR CARE VAGINAL DELIVERY SINGLE

## 2021-10-08 PROCEDURE — 250N000013 HC RX MED GY IP 250 OP 250 PS 637: Performed by: FAMILY MEDICINE

## 2021-10-08 PROCEDURE — 10907ZC DRAINAGE OF AMNIOTIC FLUID, THERAPEUTIC FROM PRODUCTS OF CONCEPTION, VIA NATURAL OR ARTIFICIAL OPENING: ICD-10-PCS | Performed by: OBSTETRICS & GYNECOLOGY

## 2021-10-08 PROCEDURE — 250N000009 HC RX 250: Performed by: OBSTETRICS & GYNECOLOGY

## 2021-10-08 PROCEDURE — G0463 HOSPITAL OUTPT CLINIC VISIT: HCPCS

## 2021-10-08 PROCEDURE — 0KQM0ZZ REPAIR PERINEUM MUSCLE, OPEN APPROACH: ICD-10-PCS | Performed by: OBSTETRICS & GYNECOLOGY

## 2021-10-08 PROCEDURE — 258N000003 HC RX IP 258 OP 636: Performed by: FAMILY MEDICINE

## 2021-10-08 PROCEDURE — 86780 TREPONEMA PALLIDUM: CPT | Performed by: FAMILY MEDICINE

## 2021-10-08 PROCEDURE — 59400 OBSTETRICAL CARE: CPT | Performed by: OBSTETRICS & GYNECOLOGY

## 2021-10-08 PROCEDURE — 85025 COMPLETE CBC W/AUTO DIFF WBC: CPT | Performed by: FAMILY MEDICINE

## 2021-10-08 PROCEDURE — 250N000009 HC RX 250: Performed by: FAMILY MEDICINE

## 2021-10-08 PROCEDURE — 999N000157 HC STATISTIC RCP TIME EA 10 MIN

## 2021-10-08 PROCEDURE — 250N000013 HC RX MED GY IP 250 OP 250 PS 637: Performed by: OBSTETRICS & GYNECOLOGY

## 2021-10-08 PROCEDURE — 999N000016 HC STATISTIC ATTENDANCE AT DELIVERY

## 2021-10-08 PROCEDURE — 87635 SARS-COV-2 COVID-19 AMP PRB: CPT | Performed by: FAMILY MEDICINE

## 2021-10-08 PROCEDURE — 120N000001 HC R&B MED SURG/OB

## 2021-10-08 PROCEDURE — 86900 BLOOD TYPING SEROLOGIC ABO: CPT | Performed by: FAMILY MEDICINE

## 2021-10-08 RX ORDER — NALOXONE HYDROCHLORIDE 0.4 MG/ML
0.2 INJECTION, SOLUTION INTRAMUSCULAR; INTRAVENOUS; SUBCUTANEOUS
Status: DISCONTINUED | OUTPATIENT
Start: 2021-10-08 | End: 2021-10-08

## 2021-10-08 RX ORDER — NALOXONE HYDROCHLORIDE 0.4 MG/ML
0.4 INJECTION, SOLUTION INTRAMUSCULAR; INTRAVENOUS; SUBCUTANEOUS
Status: DISCONTINUED | OUTPATIENT
Start: 2021-10-08 | End: 2021-10-08

## 2021-10-08 RX ORDER — PROCHLORPERAZINE MALEATE 10 MG
10 TABLET ORAL EVERY 6 HOURS PRN
Status: DISCONTINUED | OUTPATIENT
Start: 2021-10-08 | End: 2021-10-08

## 2021-10-08 RX ORDER — MODIFIED LANOLIN
OINTMENT (GRAM) TOPICAL
Status: DISCONTINUED | OUTPATIENT
Start: 2021-10-08 | End: 2021-10-10 | Stop reason: HOSPADM

## 2021-10-08 RX ORDER — TRANEXAMIC ACID 10 MG/ML
1 INJECTION, SOLUTION INTRAVENOUS EVERY 30 MIN PRN
Status: DISCONTINUED | OUTPATIENT
Start: 2021-10-08 | End: 2021-10-08

## 2021-10-08 RX ORDER — BISACODYL 10 MG
10 SUPPOSITORY, RECTAL RECTAL DAILY PRN
Status: DISCONTINUED | OUTPATIENT
Start: 2021-10-08 | End: 2021-10-10 | Stop reason: HOSPADM

## 2021-10-08 RX ORDER — OXYTOCIN 10 [USP'U]/ML
10 INJECTION, SOLUTION INTRAMUSCULAR; INTRAVENOUS
Status: DISCONTINUED | OUTPATIENT
Start: 2021-10-08 | End: 2021-10-10 | Stop reason: HOSPADM

## 2021-10-08 RX ORDER — ONDANSETRON 4 MG/1
4 TABLET, ORALLY DISINTEGRATING ORAL EVERY 6 HOURS PRN
Status: DISCONTINUED | OUTPATIENT
Start: 2021-10-08 | End: 2021-10-08

## 2021-10-08 RX ORDER — ACETAMINOPHEN 325 MG/1
650 TABLET ORAL EVERY 4 HOURS PRN
Status: DISCONTINUED | OUTPATIENT
Start: 2021-10-08 | End: 2021-10-10 | Stop reason: HOSPADM

## 2021-10-08 RX ORDER — LIDOCAINE 40 MG/G
CREAM TOPICAL
Status: DISCONTINUED | OUTPATIENT
Start: 2021-10-08 | End: 2021-10-08

## 2021-10-08 RX ORDER — MISOPROSTOL 200 UG/1
400 TABLET ORAL
Status: DISCONTINUED | OUTPATIENT
Start: 2021-10-08 | End: 2021-10-10 | Stop reason: HOSPADM

## 2021-10-08 RX ORDER — MISOPROSTOL 200 UG/1
800 TABLET ORAL
Status: DISCONTINUED | OUTPATIENT
Start: 2021-10-08 | End: 2021-10-10 | Stop reason: HOSPADM

## 2021-10-08 RX ORDER — CARBOPROST TROMETHAMINE 250 UG/ML
250 INJECTION, SOLUTION INTRAMUSCULAR
Status: DISCONTINUED | OUTPATIENT
Start: 2021-10-08 | End: 2021-10-10 | Stop reason: HOSPADM

## 2021-10-08 RX ORDER — KETOROLAC TROMETHAMINE 30 MG/ML
30 INJECTION, SOLUTION INTRAMUSCULAR; INTRAVENOUS
Status: DISCONTINUED | OUTPATIENT
Start: 2021-10-08 | End: 2021-10-08

## 2021-10-08 RX ORDER — OXYTOCIN 10 [USP'U]/ML
10 INJECTION, SOLUTION INTRAMUSCULAR; INTRAVENOUS
Status: DISCONTINUED | OUTPATIENT
Start: 2021-10-08 | End: 2021-10-08

## 2021-10-08 RX ORDER — TRANEXAMIC ACID 10 MG/ML
1 INJECTION, SOLUTION INTRAVENOUS EVERY 30 MIN PRN
Status: DISCONTINUED | OUTPATIENT
Start: 2021-10-08 | End: 2021-10-10 | Stop reason: HOSPADM

## 2021-10-08 RX ORDER — HYDROCORTISONE 2.5 %
CREAM (GRAM) TOPICAL 3 TIMES DAILY PRN
Status: DISCONTINUED | OUTPATIENT
Start: 2021-10-08 | End: 2021-10-10 | Stop reason: HOSPADM

## 2021-10-08 RX ORDER — OXYTOCIN/0.9 % SODIUM CHLORIDE 30/500 ML
100-340 PLASTIC BAG, INJECTION (ML) INTRAVENOUS CONTINUOUS PRN
Status: DISCONTINUED | OUTPATIENT
Start: 2021-10-08 | End: 2021-10-08

## 2021-10-08 RX ORDER — OXYTOCIN/0.9 % SODIUM CHLORIDE 30/500 ML
340 PLASTIC BAG, INJECTION (ML) INTRAVENOUS CONTINUOUS PRN
Status: DISCONTINUED | OUTPATIENT
Start: 2021-10-08 | End: 2021-10-08

## 2021-10-08 RX ORDER — ACETAMINOPHEN 325 MG/1
650 TABLET ORAL EVERY 4 HOURS PRN
Status: DISCONTINUED | OUTPATIENT
Start: 2021-10-08 | End: 2021-10-08

## 2021-10-08 RX ORDER — DOCUSATE SODIUM 100 MG/1
100 CAPSULE, LIQUID FILLED ORAL DAILY
Status: DISCONTINUED | OUTPATIENT
Start: 2021-10-08 | End: 2021-10-10 | Stop reason: HOSPADM

## 2021-10-08 RX ORDER — IBUPROFEN 600 MG/1
600 TABLET, FILM COATED ORAL
Status: DISCONTINUED | OUTPATIENT
Start: 2021-10-08 | End: 2021-10-08

## 2021-10-08 RX ORDER — IBUPROFEN 800 MG/1
800 TABLET, FILM COATED ORAL EVERY 6 HOURS PRN
Status: DISCONTINUED | OUTPATIENT
Start: 2021-10-08 | End: 2021-10-10 | Stop reason: HOSPADM

## 2021-10-08 RX ORDER — PROCHLORPERAZINE 25 MG
25 SUPPOSITORY, RECTAL RECTAL EVERY 12 HOURS PRN
Status: DISCONTINUED | OUTPATIENT
Start: 2021-10-08 | End: 2021-10-08

## 2021-10-08 RX ORDER — CARBOPROST TROMETHAMINE 250 UG/ML
250 INJECTION, SOLUTION INTRAMUSCULAR
Status: DISCONTINUED | OUTPATIENT
Start: 2021-10-08 | End: 2021-10-08

## 2021-10-08 RX ORDER — SODIUM CHLORIDE, SODIUM LACTATE, POTASSIUM CHLORIDE, CALCIUM CHLORIDE 600; 310; 30; 20 MG/100ML; MG/100ML; MG/100ML; MG/100ML
INJECTION, SOLUTION INTRAVENOUS CONTINUOUS
Status: DISCONTINUED | OUTPATIENT
Start: 2021-10-08 | End: 2021-10-08

## 2021-10-08 RX ORDER — OXYTOCIN/0.9 % SODIUM CHLORIDE 30/500 ML
340 PLASTIC BAG, INJECTION (ML) INTRAVENOUS CONTINUOUS PRN
Status: DISCONTINUED | OUTPATIENT
Start: 2021-10-08 | End: 2021-10-10 | Stop reason: HOSPADM

## 2021-10-08 RX ORDER — FENTANYL CITRATE 50 UG/ML
50-100 INJECTION, SOLUTION INTRAMUSCULAR; INTRAVENOUS
Status: DISCONTINUED | OUTPATIENT
Start: 2021-10-08 | End: 2021-10-08

## 2021-10-08 RX ORDER — OXYTOCIN/0.9 % SODIUM CHLORIDE 30/500 ML
1-24 PLASTIC BAG, INJECTION (ML) INTRAVENOUS CONTINUOUS
Status: DISCONTINUED | OUTPATIENT
Start: 2021-10-08 | End: 2021-10-08

## 2021-10-08 RX ORDER — ONDANSETRON 2 MG/ML
4 INJECTION INTRAMUSCULAR; INTRAVENOUS EVERY 6 HOURS PRN
Status: DISCONTINUED | OUTPATIENT
Start: 2021-10-08 | End: 2021-10-08

## 2021-10-08 RX ORDER — MISOPROSTOL 200 UG/1
400 TABLET ORAL
Status: DISCONTINUED | OUTPATIENT
Start: 2021-10-08 | End: 2021-10-08

## 2021-10-08 RX ORDER — METHYLERGONOVINE MALEATE 0.2 MG/ML
200 INJECTION INTRAVENOUS
Status: DISCONTINUED | OUTPATIENT
Start: 2021-10-08 | End: 2021-10-08

## 2021-10-08 RX ORDER — CITALOPRAM HYDROBROMIDE 20 MG/1
40 TABLET ORAL DAILY
Status: DISCONTINUED | OUTPATIENT
Start: 2021-10-08 | End: 2021-10-10 | Stop reason: HOSPADM

## 2021-10-08 RX ORDER — METOCLOPRAMIDE HYDROCHLORIDE 5 MG/ML
10 INJECTION INTRAMUSCULAR; INTRAVENOUS EVERY 6 HOURS PRN
Status: DISCONTINUED | OUTPATIENT
Start: 2021-10-08 | End: 2021-10-08

## 2021-10-08 RX ORDER — METHYLERGONOVINE MALEATE 0.2 MG/ML
200 INJECTION INTRAVENOUS
Status: DISCONTINUED | OUTPATIENT
Start: 2021-10-08 | End: 2021-10-10 | Stop reason: HOSPADM

## 2021-10-08 RX ORDER — MISOPROSTOL 200 UG/1
800 TABLET ORAL
Status: DISCONTINUED | OUTPATIENT
Start: 2021-10-08 | End: 2021-10-08

## 2021-10-08 RX ORDER — METOCLOPRAMIDE 10 MG/1
10 TABLET ORAL EVERY 6 HOURS PRN
Status: DISCONTINUED | OUTPATIENT
Start: 2021-10-08 | End: 2021-10-08

## 2021-10-08 RX ADMIN — IBUPROFEN 600 MG: 600 TABLET ORAL at 16:03

## 2021-10-08 RX ADMIN — Medication 2 MILLI-UNITS/MIN: at 13:00

## 2021-10-08 RX ADMIN — LIDOCAINE HYDROCHLORIDE 20 ML: 10 INJECTION, SOLUTION EPIDURAL; INFILTRATION; INTRACAUDAL; PERINEURAL at 15:43

## 2021-10-08 RX ADMIN — IBUPROFEN 800 MG: 800 TABLET, FILM COATED ORAL at 23:23

## 2021-10-08 RX ADMIN — SODIUM CHLORIDE, POTASSIUM CHLORIDE, SODIUM LACTATE AND CALCIUM CHLORIDE: 600; 310; 30; 20 INJECTION, SOLUTION INTRAVENOUS at 14:31

## 2021-10-08 ASSESSMENT — ACTIVITIES OF DAILY LIVING (ADL): PATIENT_/_FAMILY_COMMUNICATION_STYLE: SPOKEN LANGUAGE (ENGLISH OR BILINGUAL)

## 2021-10-08 NOTE — PROGRESS NOTES
"     OB Admit History & Physical  2021    Alexi Montemayor  [id number]    History of Present Illness:   Alexi Montemayor  Is a 36 year old female who is  being seen for   Spontaneous labor.  Her Estimated Date of Delivery is Oct 14, 2021, and gestational age is 39w1d.  Her last menstrual period was Patient's last menstrual period was 2021.. She was seen for labor assessment yesterday w/o cervical change.  Returned this AM at 0440 and is now in active labor.    OB History:   Estimated body mass index is 40.03 kg/m  as calculated from the following:    Height as of 10/7/21: 1.6 m (5' 3\").    Weight as of 10/7/21: 102.5 kg (226 lb).  OB History    Para Term  AB Living   2 1 1 0 0 1   SAB TAB Ectopic Multiple Live Births   0 0 0 0 1      # Outcome Date GA Lbr Ronnie/2nd Weight Sex Delivery Anes PTL Lv   2 Current            1 Term 06 40w0d  3.175 kg (7 lb) M Vag-Spont None  LUIS FERNANDO      Name: Edd       Her prenatal course has been  complicated by depression/anxiety/PTSD, om citalopram.  Estimated fetal weight =  3200gm    Past Medical History:   Past Medical History:   Diagnosis Date     Anxiety      Depression      Depressive disorder      Past Surgical History:   Procedure Laterality Date     EXTRACTION(S) DENTAL       Skin mole removal         Medications:   No current outpatient medications on file.       Allergies:   Pcn [penicillins] and Sulfa drugs          Physical Exam:  Vitals:  Blood pressure 124/72, resp. rate 18, last menstrual period 2021, not currently breastfeeding.   FHT rate at 150s bpm ,with contractions every  3-4min.  GEN: Alert Awake in NAD  Abdomen:  Gravid  Cervix and Dilation:  5/bulging bag per admitted RN  Ext NT    Labs:   Hemoglobin   Date Value Ref Range Status   10/08/2021 11.7 11.7 - 15.7 g/dL Final   2021 11.1 (L) 11.7 - 15.7 g/dL Final   03/10/2021 11.9 11.7 - 15.7 g/dL Final   10/12/2018 12.8 11.7 - 15.7 g/dL Final     No results found for: " RUBELLAABIGG   Lab Results   Component Value Date    ABO O 03/10/2021           Lab Results   Component Value Date    RH Pos 03/10/2021      COVID pending  GBS neg    Assessment and Plan:   Intrauterine pregnancy at 39w1d  complicated by depression/anxiety/PTSD and obesity who presents with spontaneous labor.    1. Admit to L&D  2. Monitor progress  3. Plans nitrous for labor analgesia        Oswaldo Morales MD   Dept of OB/GYN  October 8, 2021

## 2021-10-08 NOTE — PROGRESS NOTES
OB PN    7cm/90/-3  AROM occurred with ceric check.  Moderate mec.  Monitor progress.    Pj Morales MD

## 2021-10-08 NOTE — PROVIDER NOTIFICATION
10/08/21 1420   Provider Notification   Provider Name/Title Dr. Morales   Method of Notification In Department   Request Evaluate - Remote   1330- standing at bedside. 1420- Patient on right side, peanut ball between knees. Dr. Morales viewed fetal and contraction tracing. Will continue Pitocin at 2 gama units.1435- Dr. Morales at bedside.

## 2021-10-08 NOTE — PROGRESS NOTES
Ctx spacing out to Q5-6 mins and minimal cervical change.  Patient was resistant to pitocin augmentation earlier but now agrees.    Pitocin augmentation per protocol.    Pj Morales MD

## 2021-10-08 NOTE — PLAN OF CARE
Data: Patient presented to [facility]: 10/8/2021  4:16 AM.  Reason for maternal/fetal assessment is uterine contractions. Patient reports increase in uterine contraction frequency and intensity as of 10 pm last evening.  Patient is a .  Prenatal record reviewed. Pregnancy has been uncomplicated..  Gestational Age 39w1d. VSS. Fetal movement active. Patient denies leaking of vaginal fluid/rupture of membranes, vaginal bleeding, abdominal pain, pelvic pressure, nausea, vomiting, headache, visual disturbances, epigastric or URQ pain, significant edema. Support person is present.   Action: Verbal consent for EFM. Triage assessment completed. Bill of rights reviewed.  Response: Patient verbalized agreement with plan. Will contact Dr Kaci Betts with update and for further orders.

## 2021-10-08 NOTE — PLAN OF CARE
Data: Alexi Montemayor transferred to 430 via wheelchair at 1815. Baby transferred via parent's arms.  Action: Receiving unit notified of transfer: Yes. Patient and family notified of room change. Report given to Oanh RG at 1835. Belongings sent to receiving unit. Accompanied by Registered Nurse. Oriented patient to surroundings. Call light within reach. ID bands double-checked with receiving RN.  Response: Patient tolerated transfer and is stable.

## 2021-10-08 NOTE — L&D DELIVERY NOTE
OB Vaginal Delivery Note    Alexi Montemayor MRN# [id number]   Age: 36 year old YOB: 1985     Pitocin augmentation  GA: 39w1d  GP:   Labor Complications:  Dysfunctional labor  EBL:   mL  Delivery QBL:    Delivery Type: Vaginal, Spontaneous   ROM to Delivery Time: (Delivered) Hours: 7 Minutes: 35  [place] Weight: 3.31 kg (7 lb 4.8 oz)    1 Minute 5 Minute 10 Minute   Apgar Totals: 7   8        GUSTAVO SANDRA;VICENTA EVER     Delivery Details:  Alexi Montemayor, a 36 year old  female delivered a viable infant with apgars of 7  and 8 . NICU staff present due to meconium stained AF. Patient was fully dilated and pushing after   hours   minutes in active labor. Delivery was via vaginal, spontaneous  to a sterile field under nitrous oxide;local  anesthesia. Infant delivered in vertex  left  occiput  anterior  position. Anterior and posterior shoulders delivered without difficulty. The cord was clamped, cut twice and   were noted. Cord blood was obtained in routine fashion with the following disposition:  .      Cord complications: none   Placenta delivered at 10/8/2021  3:44 PM . Placental disposition was Hospital disposal . Fundal massage performed and fundus found to be firm.     Episiotomy: none    Perineum, vagina, cervix were inspected, and the following lacerations were noted:   Perineal lacerations: 2nd                Any lacerations were repaired in the usual fashion using 3-0 monocryl.    Excellent hemostasis was noted. Needle count correct. Infant and patient in delivery room in good and stable condition.        Leyla MontemayorCristiAlexi [[id number]]    Labor Event Times    Labor onset date: 10/7/21 Onset time: 10:00 PM   Dilation complete date: 10/8/21 Complete time:  3:04 PM   Start pushing date/time: 10/8/2021 1505      Labor Events     labor?: No   steroids: None  Labor Type: Spontaneous, Augmentation     Antibiotics received during labor?: No     Rupture date/time:  10/8/21 0805   Rupture type: Artificial Rupture of Membranes  Fluid color: Meconium  Fluid odor: Normal     Induction: AROM  Induction date/time:     Cervical ripening date/time:        Augmentation: Oxytocin  Indications for augmentation: Ineffective Contraction Pattern     Delivery/Placenta Date and Time    Delivery Date: 10/8/21 Delivery Time:  3:40 PM   Placenta Date/Time: 10/8/2021  3:44 PM  Oxytocin given at the time of delivery: after delivery of baby  Delivering clinician: Oswaldo Mcmahan MD   Other personnel present at delivery:  Provider Role   Gladys Smart RN Blouin, Helen, RN          Vaginal Counts     Initial count performed by 2 team members:  Two Team Members   andressa mcmahan       Needles Suture Needles Sponges (RETIRED) Instruments   Initial counts 2  5    Added to count  2     Relief counts       Final counts 2 2 5          Placed during labor Accounted for at the end of labor   FSE Yes Yes   IUPC NA    Cervadil NA               Final count performed by 2 team members:  Two Team Members   marj hart      Final count correct?: Yes     Apgars    Living status: Living   1 Minute 5 Minute 10 Minute 15 Minute 20 Minute   Skin color: 0  1       Heart rate: 2  2       Reflex irritability: 2  2       Muscle tone: 1  1       Respiratory effort: 2  2       Total: 7  8       Apgars assigned by: SHERLYN FROST,CNP     Cord    Cord Complications: None               Stem cell collection?: No       Mead Resuscitation    Methods: Temp Skin Probe, Suctioning, Oxygen, NCPAP, Manfred Puff, Oximetry  Mead Care at Delivery: Called by Dr. Mcmahan for mec stained fluid, Maternal meds include celexa.  Infant delivered to maternal abdomen, stimulated and dried without cry.  Cord clamped at 30 seconds, infant placed under radiant warmer.  Continue drying, stimulation.  HR always >120s, breath sounds equal good air entry.  Infant not crying.  CPAP applied due to substernal  retracting and sats 58% at 4-1/2 minutes.  Initially 30%, increased to 40% oxygen.  Sats improved over several minutes. Able to wean off CPAP and oxygen.  Infant weighed.  Sats consistently >92%, mostly 96-98% in room air, some substernal retracting noted but sats maintained.  Monitor with mother through transition. Maria Ines PINTOSANTOSH Martins CNP   10/8/2021 , 4:13 PM.          Measurements    Weight: 7 lb 4.8 oz       Labor Events and Shoulder Dystocia    Fetal Tracing Prior to Delivery: Category 2  Shoulder dystocia present?: Neg     Delivery (Maternal) (Provider to Complete) ([id number])    Episiotomy: None  Perineal lacerations: 2nd Repaired?: Yes   Repair suture: 3-0 Monocryl  Genital tract inspection done: Pos     Blood Loss  Mother: Alexi Montemayor #[id number]   Start of Mother's Information    Delivery Blood Loss  10/07/21 2200 - 10/08/21 1615    None           End of Mother's Information  Mother: Alexi Montemayor #[id number]          Delivery - Provider to Complete ([id number])    Delivering clinician: Oswaldo Morales MD  Attempted Delivery Types (Choose all that apply): Spontaneous Vaginal Delivery  Delivery Type (Choose the 1 that will go to the Birth History): Vaginal, Spontaneous                   Other personnel:  Provider Role   Gladys Smart RN Blouin, Helen, RN                 Placenta    Date/Time: 10/8/2021  3:44 PM  Removal: Spontaneous  Comments: spontaneous, intact 3 vessel cord  Disposition: Hospital disposal           Anesthesia    Method: Nitrous Oxide, Local                Presentation and Position    Presentation: Vertex    Position: Left Occiput Anterior                 Oswaldo Morales MD

## 2021-10-08 NOTE — PROVIDER NOTIFICATION
10/08/21 0800   Provider Notification   Provider Name/Title Dr. Morales   Method of Notification At Bedside   Request Evaluate in Person   Notification Reason SVE   BBOW, US scan done, vertex. SVE 7/90/-3. AROM, large amount meconium stained fluid. Patient does not want Fentanyl or epidural in labor, may use nitrous later but declines at this time.

## 2021-10-08 NOTE — PROVIDER NOTIFICATION
"   10/08/21 1000   Provider Notification   Provider Name/Title Dr. Moraels   Method of Notification At Bedside   Request Evaluate in Person   Notification Reason SVE   Patient uncomfortable with exam. Denies need for pain medication at this time. Activity encouraged. Patient declines at this time as \"it is uncomfortable to walk\" due to pubis symphysis pain.   "

## 2021-10-08 NOTE — PROVIDER NOTIFICATION
10/08/21 0440   Provider Notification   Provider Name/Title Dr. Betts   Method of Notification Phone   Request Evaluate - Remote   Notification Reason SVE;Patient Arrived;Membrane Status;Labor Status       Dr. Betts called and advised of patient arrival.   OB/history reviewed.  Categoy I tracing.  Raymundo 1-4 minutes.  SVE 5/80/-3.    Patient to be admitted for labor, verbal orders received and placed for admission lab, pain management.  Marisa Antony RN on 10/8/2021 at 6:21 AM

## 2021-10-09 PROCEDURE — 250N000013 HC RX MED GY IP 250 OP 250 PS 637: Performed by: OBSTETRICS & GYNECOLOGY

## 2021-10-09 PROCEDURE — 120N000001 HC R&B MED SURG/OB

## 2021-10-09 RX ADMIN — DOCUSATE SODIUM 100 MG: 100 CAPSULE, LIQUID FILLED ORAL at 08:37

## 2021-10-09 RX ADMIN — CITALOPRAM HYDROBROMIDE 40 MG: 20 TABLET ORAL at 08:37

## 2021-10-09 RX ADMIN — IBUPROFEN 800 MG: 800 TABLET, FILM COATED ORAL at 13:55

## 2021-10-09 NOTE — PLAN OF CARE
Pt up ad jaron and voiding easily. Using ice and tucks for comfort. Denies pain. Working on breastfeeding with shield. Baby very sleepy and refusing to latch. Lactation consult activated for tonight.  present and supportive.

## 2021-10-09 NOTE — LACTATION NOTE
Lactation visit. Patient stated infant has been sleepy at breast and not latching. Assisted with positioning infant at left breast in cross cradle with nipple shield. Infant did not open mouth wide enough to get nipple in mouth after multiple attempts. Repositioned on right side in football position and infant latched with manual assistance. He held nipple in mouth but was disinterested and did not suck. Assessed suck with finger and it was uncoordinated and found that infant has a tight frenulum. Encouraged patient to discuss oral ties with pediatrician. Patient requested pump and writer set it up and encouraged use every 3 hours. Discussed normal course of lactation.

## 2021-10-09 NOTE — PLAN OF CARE
VSS; patient is ambulating to the bathroom on her own and voids large amounts without difficulty. She denies any pain. Her last child is 15 years old; she does require assistance with nursing the baby. Infant is very sleepy and disinterested in nursing. A shield was given to assist baby with latch but still have had only attempts. Continue to assist as needed.

## 2021-10-09 NOTE — PLAN OF CARE
Vitals WNL. Breastfeeding  with staff assistance and education. Tolerating a regular diet, voiding without difficulty and ambulating in room ad jaron. Postpartum bleeding and fundal exam WNL. Ibuprofen provided for minor uterine cramping pain. Encouraging to take on  cares at this time.

## 2021-10-09 NOTE — PROGRESS NOTES
[facility] Obstetrics Post-Partum Progress Note          Assessment and Plan:    Assessment:   Post-partum day #1  Normal spontaneous vaginal delivery  L&D complications: None      Doing well.  Normal healing wound.  No excessive bleeding  Pain well-controlled.  Pt desires discharge 10/10/21      Plan:   Ambulation encouraged  Breast feeding strategies discussed  Monitor wound for signs of infection  Pain control measures as needed  Reportable signs and symptoms dicussed with the patient  Anticipate discharge tomorrow           Interval History:   Doing well.  Pain is well-controlled.  No fevers.  No history of foul-smelling vaginal discharge.  Good appetite.  Denies chest pain, shortness of breath, nausea or vomiting.  Vaginal bleeding is similar to a heavy menstrual flow.  Ambulatory.  Breastfeeding well.          Significant Problems:      Past Medical History:   Diagnosis Date     Anxiety      Depression      Depressive disorder              Review of Systems:    The patient denies any chest pain, shortness of breath, excessive pain, fever, chills, purulent drainage from the wound, nausea or vomiting.          Medications:     All medications related to the patient's surgery have been reviewed  Current Facility-Administered Medications   Medication     acetaminophen (TYLENOL) tablet 650 mg     benzocaine (AMERICAINE) 20 % topical spray     bisacodyl (DULCOLAX) Suppository 10 mg     carboprost (HEMABATE) injection 250 mcg     citalopram (celeXA) tablet 40 mg     docusate sodium (COLACE) capsule 100 mg     hydrocortisone 2.5 % cream     ibuprofen (ADVIL/MOTRIN) tablet 800 mg     lanolin cream     methylergonovine (METHERGINE) injection 200 mcg     misoprostol (CYTOTEC) tablet 400 mcg    Or     misoprostol (CYTOTEC) tablet 800 mcg     No MMR Needed - Assessment: Patient does not need MMR vaccine     No Tdap Needed - Assessment: Patient does not need Tdap vaccine     oxytocin (PITOCIN) 30 units in  500 mL 0.9% NaCl infusion     oxytocin (PITOCIN) injection 10 Units     tranexamic acid 1 g in 100 mL 0.7% NaCl IV bag (premix)             Physical Exam:   Vitals were reviewed  All vitals stable  Temp: 97.8  F (36.6  C) Temp src: Oral BP: 116/63 Pulse: 94   Resp: 18        Uterine fundus is firm, non-tender and at the level of the umbilicus          Data:     All laboratory data related to this surgery reviewed  Hemoglobin   Date Value Ref Range Status   10/08/2021 11.7 11.7 - 15.7 g/dL Final   10/08/2021 11.7 11.7 - 15.7 g/dL Final   07/26/2021 11.1 (L) 11.7 - 15.7 g/dL Final   03/10/2021 11.9 11.7 - 15.7 g/dL Final   10/12/2018 12.8 11.7 - 15.7 g/dL Final     No imaging studies have been ordered    Reynaldo Mcguire MD

## 2021-10-09 NOTE — PLAN OF CARE
Patient up ad jaron in room, voiding without difficulties. Pain controlled with use of oral pain medications. Bonding well with infant.

## 2021-10-10 VITALS
RESPIRATION RATE: 16 BRPM | TEMPERATURE: 98.3 F | DIASTOLIC BLOOD PRESSURE: 46 MMHG | HEART RATE: 76 BPM | SYSTOLIC BLOOD PRESSURE: 102 MMHG

## 2021-10-10 PROCEDURE — 250N000013 HC RX MED GY IP 250 OP 250 PS 637: Performed by: OBSTETRICS & GYNECOLOGY

## 2021-10-10 RX ADMIN — DOCUSATE SODIUM 100 MG: 100 CAPSULE, LIQUID FILLED ORAL at 09:16

## 2021-10-10 RX ADMIN — CITALOPRAM HYDROBROMIDE 40 MG: 20 TABLET ORAL at 09:16

## 2021-10-10 RX ADMIN — IBUPROFEN 800 MG: 800 TABLET, FILM COATED ORAL at 03:45

## 2021-10-10 NOTE — PLAN OF CARE
VSS, pt has declined pharmacological interventions so far at shift, mostly feeding with formula, encouraged to attempt breast feeding before formula; discharge education completed and follow up explained for OB PP visit in 2 and 6 weeks, home care faxed, discharging shortly with baby in stable condition.

## 2021-10-10 NOTE — PLAN OF CARE
Data: Vital signs within normal limits. Postpartum checks within normal limits, see flow record. Patient eating and drinking normally. Patient able to empty bladder independently and is up ambulating. No apparent signs of infection.  2nd degree laceration  healing well, using ice, spray and tucks with relief. Patient performing self cares and is able to care for infant.  Action: Patient medicated once during the shift for pain. See MAR. Patient reassessed within 1 hour after medication and pain was improved or patient sleeping. Patient education done about safe infant sleep, basic infant cares, bulb syringe usage, breast and bottle feeding cues, positions and satiety, self cares, PPD and resources available. See flow record. PP and [place] booklet reviewed ad questions answered.   Response: Positive attachment behaviors observed with infant. Support person Miguel present.   Plan: Will continue to monitor, assess, and prepare for discharge. Anticipate discharge on 10/10/21.

## 2021-10-10 NOTE — DISCHARGE SUMMARY
VAGINAL DELIVERY DISCHARGE SUMMARY    Admit date: 10/8/2021  Discharge date: 10/10/2021     Admit Dx:   36 year old  at 39w1d    Indication for care in labor or delivery [O75.9]   BMI 40  AMA  History depression anxiety and PTSD    Discharge Dx:  - Same as above, s/p     Procedures:  - Spontaneous vaginal delivery    Admit HPI:  Alexi Montemayor is a 36 year old female who is  @ 39w0d pregnant and being seen for rule out labor, cervix changed from 0 to 1, category 1 tracing.  She is not in much pain and desires discharge home.  Risks, benefits and alternatives discussed, such as laboring in the hospital with monitoring vs going home.   She would like to go home.       Please see her admit H&P for full details of her PMH, PSH, Meds, Allergies and exam on admit.    Hospital course:  Alexi Montemayor, a 36 year old  female delivered a viable infant with apgars of 7  and 8 . NICU staff present due to meconium stained AF. Patient was fully dilated and pushing after   hours   minutes in active labor. Delivery was via vaginal, spontaneous  to a sterile field under nitrous oxide;local  anesthesia. Infant delivered in vertex  left  occiput  anterior  position. Anterior and posterior shoulders delivered without difficulty. The cord was clamped, cut twice and   were noted. Cord blood was obtained in routine fashion with the following disposition:  .       Cord complications: none   Placenta delivered at 10/8/2021  3:44 PM . Placental disposition was Hospital disposal . Fundal massage performed and fundus found to be firm.      Episiotomy: none    Perineum, vagina, cervix were inspected, and the following lacerations were noted:   Perineal lacerations: 2nd                 Any lacerations were repaired in the usual fashion using 3-0 monocryl.     Excellent hemostasis was noted. Needle count correct. Infant and patient in delivery room in good and stable condition.      Please see her Delivery Summary for full  details regarding her delivery.    Her postpartum course was complicated by nothing. By the time of discharge, she was meeting all of her postpartum goals and deemed stable for discharge. She was voiding without difficulty, tolerating a regular diet without nausea and vomiting, her pain was well controlled on oral pain medicines and her lochia was appropriate. She was hemodynamically stable.     Physical exam on the day of discharge:  Vitals:    10/09/21 1601 10/09/21 2053 10/10/21 0340 10/10/21 0808   BP: 109/67 114/71 110/70 102/46   Pulse: 87 89 76 76   Resp: 18 16 16 16   Temp: 98.5  F (36.9  C) 98.6  F (37  C) 97.9  F (36.6  C) 98.3  F (36.8  C)   TempSrc: Oral Oral Oral Oral       General: sitting up, alert and cooperative  Abd: soft, non-distended, non-tender. Fundus firm, nontender, 2 cm below umbilicus.   Extremities: calves nontender, 1+ edema of lower extremities bilaterally    Lab Results   Component Value Date    HGB 11.7 10/08/2021    HGB 11.7 10/08/2021    HGB 11.9 03/10/2021    HGB 12.8 10/12/2018     Blood type:   Lab Results   Component Value Date    RH Pos 03/10/2021       Discharge/Disposition:  Ms. Alexi Montemayor was discharged to home in stable condition with the following instructions/medications:  1) Call for temperature > 100.4, foul smelling vaginal discharge, bleeding > 1 pad per hour x 2 hrs, pain not controlled by oral pain meds, thoughts of self-harm or harming the infant.  2) Contraception counseling was provided.  3) She was instructed to follow-up with her primary OB in 6 weeks for a routine postpartum visit, and in 1 week for a blood pressure check if having any blood pressure issues while hospitalized.  4) She was instructed to continue her PNV on discharge if she wished to breast feed her infant.  5) She was discharged home with the following medications: none (will use home meds)       Diana Sanders MD, MPH  [facility]

## 2021-10-10 NOTE — LACTATION NOTE
Lactation in to see patient. Patient states breastfeeding not going well. She has a plan in place to pump and supplement with any EBM and formula. Using a shield may try to get baby back to breast. Feeling that breastfeeding will go better when she is in the comfort of her own home. Discussed started with shield then removing it as baby gets more efficient at breast. All questions answered at this time. Offered assistance if patient want to get baby to breast before discharge home today.

## 2021-10-27 ENCOUNTER — HOSPITAL ENCOUNTER (EMERGENCY)
Facility: CLINIC | Age: 36
Discharge: HOME | End: 2021-10-27
Attending: EMERGENCY MEDICINE | Admitting: EMERGENCY MEDICINE
Payer: COMMERCIAL

## 2021-10-27 VITALS
HEART RATE: 87 BPM | WEIGHT: 200 LBS | SYSTOLIC BLOOD PRESSURE: 139 MMHG | TEMPERATURE: 97 F | BODY MASS INDEX: 35.43 KG/M2 | DIASTOLIC BLOOD PRESSURE: 100 MMHG | OXYGEN SATURATION: 98 % | RESPIRATION RATE: 16 BRPM

## 2021-10-27 DIAGNOSIS — S61.211A LACERATION OF LEFT INDEX FINGER WITHOUT FOREIGN BODY WITHOUT DAMAGE TO NAIL, INITIAL ENCOUNTER: ICD-10-CM

## 2021-10-27 DIAGNOSIS — N39.0 URINARY TRACT INFECTION WITHOUT HEMATURIA, SITE UNSPECIFIED: ICD-10-CM

## 2021-10-27 DIAGNOSIS — I10 HYPERTENSION, UNSPECIFIED TYPE: ICD-10-CM

## 2021-10-27 LAB
ALBUMIN SERPL-MCNC: 3.7 G/DL (ref 3.4–5)
ALBUMIN UR-MCNC: NEGATIVE MG/DL
ALP SERPL-CCNC: 92 U/L (ref 40–150)
ALT SERPL W P-5'-P-CCNC: 41 U/L (ref 0–50)
ANION GAP SERPL CALCULATED.3IONS-SCNC: 7 MMOL/L (ref 3–14)
APPEARANCE UR: ABNORMAL
AST SERPL W P-5'-P-CCNC: 19 U/L (ref 0–45)
BACTERIA #/AREA URNS HPF: ABNORMAL /HPF
BASOPHILS # BLD AUTO: 0 10E3/UL (ref 0–0.2)
BASOPHILS NFR BLD AUTO: 1 %
BILIRUB SERPL-MCNC: 0.2 MG/DL (ref 0.2–1.3)
BILIRUB UR QL STRIP: NEGATIVE
BUN SERPL-MCNC: 12 MG/DL (ref 7–30)
CALCIUM SERPL-MCNC: 9.3 MG/DL (ref 8.5–10.1)
CHLORIDE BLD-SCNC: 103 MMOL/L (ref 94–109)
CO2 SERPL-SCNC: 28 MMOL/L (ref 20–32)
COLOR UR AUTO: ABNORMAL
CREAT SERPL-MCNC: 0.86 MG/DL (ref 0.52–1.04)
CREAT UR-MCNC: 52 MG/DL
EOSINOPHIL # BLD AUTO: 0.2 10E3/UL (ref 0–0.7)
EOSINOPHIL NFR BLD AUTO: 3 %
ERYTHROCYTE [DISTWIDTH] IN BLOOD BY AUTOMATED COUNT: 12.9 % (ref 10–15)
GFR SERPL CREATININE-BSD FRML MDRD: 87 ML/MIN/1.73M2
GLUCOSE BLD-MCNC: 96 MG/DL (ref 70–99)
GLUCOSE UR STRIP-MCNC: NEGATIVE MG/DL
HCT VFR BLD AUTO: 43.9 % (ref 35–47)
HGB BLD-MCNC: 13.6 G/DL (ref 11.7–15.7)
HGB UR QL STRIP: ABNORMAL
IMM GRANULOCYTES # BLD: 0.1 10E3/UL
IMM GRANULOCYTES NFR BLD: 1 %
KETONES UR STRIP-MCNC: NEGATIVE MG/DL
LEUKOCYTE ESTERASE UR QL STRIP: ABNORMAL
LYMPHOCYTES # BLD AUTO: 3.2 10E3/UL (ref 0.8–5.3)
LYMPHOCYTES NFR BLD AUTO: 38 %
MCH RBC QN AUTO: 27 PG (ref 26.5–33)
MCHC RBC AUTO-ENTMCNC: 31 G/DL (ref 31.5–36.5)
MCV RBC AUTO: 87 FL (ref 78–100)
MONOCYTES # BLD AUTO: 0.5 10E3/UL (ref 0–1.3)
MONOCYTES NFR BLD AUTO: 6 %
MUCOUS THREADS #/AREA URNS LPF: PRESENT /LPF
NEUTROPHILS # BLD AUTO: 4.4 10E3/UL (ref 1.6–8.3)
NEUTROPHILS NFR BLD AUTO: 51 %
NITRATE UR QL: NEGATIVE
NRBC # BLD AUTO: 0 10E3/UL
NRBC BLD AUTO-RTO: 0 /100
PH UR STRIP: 6 [PH] (ref 5–7)
PLATELET # BLD AUTO: 286 10E3/UL (ref 150–450)
POTASSIUM BLD-SCNC: 3.7 MMOL/L (ref 3.4–5.3)
PROT SERPL-MCNC: 8.1 G/DL (ref 6.8–8.8)
PROT UR-MCNC: 0.09 G/L
PROT/CREAT 24H UR: 0.17 G/G CR (ref 0–0.2)
RBC # BLD AUTO: 5.04 10E6/UL (ref 3.8–5.2)
RBC URINE: 1 /HPF
SODIUM SERPL-SCNC: 138 MMOL/L (ref 133–144)
SP GR UR STRIP: 1.01 (ref 1–1.03)
SQUAMOUS EPITHELIAL: 1 /HPF
UROBILINOGEN UR STRIP-MCNC: NORMAL MG/DL
WBC # BLD AUTO: 8.5 10E3/UL (ref 4–11)
WBC URINE: 33 /HPF

## 2021-10-27 PROCEDURE — 36415 COLL VENOUS BLD VENIPUNCTURE: CPT | Performed by: EMERGENCY MEDICINE

## 2021-10-27 PROCEDURE — 85025 COMPLETE CBC W/AUTO DIFF WBC: CPT | Performed by: EMERGENCY MEDICINE

## 2021-10-27 PROCEDURE — 81001 URINALYSIS AUTO W/SCOPE: CPT | Performed by: EMERGENCY MEDICINE

## 2021-10-27 PROCEDURE — 84156 ASSAY OF PROTEIN URINE: CPT | Performed by: EMERGENCY MEDICINE

## 2021-10-27 PROCEDURE — 80053 COMPREHEN METABOLIC PANEL: CPT | Performed by: EMERGENCY MEDICINE

## 2021-10-27 PROCEDURE — 99283 EMERGENCY DEPT VISIT LOW MDM: CPT

## 2021-10-27 PROCEDURE — 87086 URINE CULTURE/COLONY COUNT: CPT | Performed by: EMERGENCY MEDICINE

## 2021-10-27 RX ORDER — CEPHALEXIN 500 MG/1
500 CAPSULE ORAL 4 TIMES DAILY
Qty: 28 CAPSULE | Refills: 0 | Status: SHIPPED | OUTPATIENT
Start: 2021-10-27 | End: 2021-11-03

## 2021-10-27 ASSESSMENT — ENCOUNTER SYMPTOMS: ROS SKIN COMMENTS: PUNCTURE WOUND

## 2021-10-28 NOTE — ED TRIAGE NOTES
Pt arrives to the ED due to laceration to the pointer finger on left hand that happened 1 hr prior to arrival. CMS intact. Bleeding controlled. Pt unsure of tetanus status.

## 2021-10-28 NOTE — DISCHARGE INSTRUCTIONS
No need for antibiotics at this time but watch for signs of infection in regards to the laceration.     Follow up with Ob/GYN for repeat blood pressure check and evaluation.

## 2021-10-28 NOTE — ED PROVIDER NOTES
History     Chief Complaint:  Laceration    HPI   Alexi Montemayor is a 36 year old female who presents with laceration to her left index finger between the DIP and PIP joint on the dorsal side.  Appears to be more of a puncture wound from the end of a knife.  No other injuries.  Distally sensation intact.  Bleeding is controlled.    Review of Systems   Skin:        Puncture wound   All other systems reviewed and are negative.    Allergies:  Penicillins  Sulfa Drugs      Medications:    citalopram   Nystatin  fluconazole    Past Medical History:    Anxiety  depression    Past Surgical History:    Dental extractions  Skin mole removal    Family History:    Mother: cervical cancer, multiple sclerosis, vaginal cancer, anxiety disorder  Maternal Aunt: schizophrenia  Father: diabetes, hypertension  Maternal grandmother: diabetes type 2, hypertension    Social History:  Present to the ED alone    Physical Exam     Patient Vitals for the past 24 hrs:   BP Temp Temp src Pulse Resp SpO2 Weight   10/27/21 2225 (!) 129/96 -- -- -- -- 96 % --   10/27/21 2224 (!) 129/96 -- -- 80 -- -- --   10/27/21 2136 (!) 135/101 97  F (36.1  C) Temporal 71 18 99 % 90.7 kg (200 lb)     Physical Exam  General: Patient is alert, awake and interactive when I enter the room  Head: The scalp, face, and head appear normal  Eyes: Conjunctivae are normal  ENT: The nose is normal, Pinnae are normal, External acoustic canals are normal  Neck: Trachea midline  CV: Pulses are normal.   Resp: No respiratory distress   Musc: Normal muscular tone, moving all extremities.  Skin: Puncture wound to the left hand in between the DIP and PIP joint of the index finger on the dorsal side.  Neuro: Speech is normal and fluent. Face is symmetric.  Sensation distally intact in the affected finger.  Psych: Normal affect.  Appropriate interactions.      Emergency Department Course     Emergency Department Course:    Reviewed:  I reviewed nursing notes, vitals and past  history    Assessments:  2150 I obtained history and examined the patient as noted above.   2210 I rechecked the patient after wound had been washed out. Given that the wound is more of a puncture wound and deeper than it is wide I did not recommend closure. However the wound was extensively washed out. Discussed signs of infection. On recheck of her blood pressure she continues to have elevated diastolic blood pressures. Patient is 3 weeks postpartum so still at risk for preeclampsia. Although she has no chest pain, shortness of breath, right upper quadrant pain, headaches, worsening peripheral edema, nausea or vomiting we will perform blood work to ensure that there is no evidence of preeclampsia.    Disposition:  The patient was discharged to home.    Impression & Plan      Medical Decision Making:  Alexi is a 36-year-old female who presents emergency department with a puncture wound to her left index finger. Given that this is a puncture wound and not a significant laceration we will hold off on wound closure. However the wound was cleaned out extensively. Tetanus is up-to-date. On recheck of her blood pressure she continues to have elevated diastolic blood pressures. Patient is 3 weeks postpartum so still at risk for preeclampsia. She has no chest pain, shortness of breath, right upper quadrant pain, headaches, worsening peripheral edema, nausea or vomiting. After discussion with the patient we elected to move forward and perform blood work to rule out evidence of preeclampsia.  Likely there is no signs of endorgan damage.  We will have her follow-up closely with her OB/GYN and return to the emergency department any new or worsening symptoms.     Diagnosis:    ICD-10-CM    1. Laceration of left index finger without foreign body without damage to nail, initial encounter  S61.211A      Scribe Disclosure:  Oswaldo STERLING MD, am serving as a scribe at 9:53 PM on 10/27/2021 to document services  personally performed by Oswaldo Segura MD based on my observations and the provider's statements to me.      Oswaldo Segura MD  10/31/21 1543

## 2021-10-29 LAB — BACTERIA UR CULT: NORMAL

## 2021-10-29 NOTE — RESULT ENCOUNTER NOTE
Final urine culture report is negative.  Adult Negative Urine culture parameters per protocol: Any # Urogenital single or mixed organism, <10,000 col/ml single organism (cath specimen), and <50,000 col/ml single organism (midstream).  [facility] Emergency Dept discharge antibiotic prescribed (If applicable): Cephalexin 500 mg tablet, 1 PO QID for 7 days  Treatment recommendations per [facility] ED Lab Result Urine Culture protocol.

## 2021-10-30 ENCOUNTER — TELEPHONE (OUTPATIENT)
Dept: EMERGENCY MEDICINE | Facility: CLINIC | Age: 36
End: 2021-10-30

## 2021-10-30 NOTE — TELEPHONE ENCOUNTER
[facility] Emergency Department Lab result notification:    Reason for call  Notify of lab result  Lab Result  Final urine culture report is negative.  Adult Negative Urine culture parameters per protocol: Any # Urogenital single or mixed organism, <10,000 col/ml single organism (cath specimen), and <50,000 col/ml single organism (midstream).  [facility] Emergency Dept discharge antibiotic prescribed (If applicable): Cephalexin 500 mg tablet, 1 PO QID for 7 days  Treatment recommendations per [facility] ED Lab Result Urine Culture protocol.    ED visit Date: 10/27/21  Symptoms reported at ED visit Laceration     HPI   Alexi Montemayor is a 36 year old female who presents with laceration to her left index finger between the DIP and PIP joint on the dorsal side.  Appears to be more of a puncture wound from the end of a knife.  No other injuries.  Distally sensation intact.  Bleeding is controlled.   Miscellaneous information      Current symptoms  4:05PM; Spoke with patient. She states she is doing ok. Thinks she might have bacterial vaginosis and has an appointment scheduled with her OB provider. Denies any urinary symptoms, no frequency, urgency or pain with urination. States that she never started the antibiotic because she didn't think she had a UTI.   Recommendations/Instructions  Patient notified of lab result and treatment recommendations.  Advised per ED lab urine culture protocol that it is fine to not start the antibiotic since she is not having any urinary symptoms.   Advised to keep her appointment with her OB, return to ED with any worsening symptoms.  The patient is comfortable with the information given and has no further questions.     Contact your PCP clinic or return to the Emergency department if your:    Symptoms worsen or other concerning symptom's.    Elizabet Torers RN  [facility] [place]  Emergency Dept Lab Result RN  Ph# [phone]     Copy of Lab  result   Urine Culture  Order: [id number]  Collected:  10/27/2021 10:45 PM Status:  Final result   Visible to patient:  Yes (MyChart)  Specimen Information: Urine, Midstream         1 Result Note  Culture 10,000-50,000 CFU/mL Mixture of urogenital gee            Resulting Agency: [facility]           Specimen Collected: 10/27/21 10:45 PM Last Resulted: 10/29/21  6:56 AM

## 2021-11-17 ENCOUNTER — PRENATAL OFFICE VISIT (OUTPATIENT)
Dept: OBGYN | Facility: CLINIC | Age: 36
End: 2021-11-17
Payer: COMMERCIAL

## 2021-11-17 VITALS
DIASTOLIC BLOOD PRESSURE: 80 MMHG | WEIGHT: 209 LBS | HEIGHT: 63 IN | BODY MASS INDEX: 37.03 KG/M2 | SYSTOLIC BLOOD PRESSURE: 110 MMHG

## 2021-11-17 PROCEDURE — 99207 PR POST PARTUM EXAM: CPT | Performed by: OBSTETRICS & GYNECOLOGY

## 2021-11-17 ASSESSMENT — MIFFLIN-ST. JEOR: SCORE: 1607.15

## 2021-11-17 NOTE — NURSING NOTE
"Chief Complaint   Patient presents with     Postpartum Care     ashkan Shaffer, 10/8, , 7 lbs 4 oz       Initial /80 (BP Location: Right arm, Patient Position: Chair, Cuff Size: Adult Large)   Ht 1.6 m (5' 3\")   Wt 94.8 kg (209 lb)   LMP 2021   Breastfeeding No   BMI 37.02 kg/m   Estimated body mass index is 37.02 kg/m  as calculated from the following:    Height as of this encounter: 1.6 m (5' 3\").    Weight as of this encounter: 94.8 kg (209 lb).  BP completed using cuff size: large    Questioned patient about current smoking habits.  Pt. has never smoked.          The following HM Due: NONE    Fartun Lee CMA             "

## 2021-11-18 ASSESSMENT — PATIENT HEALTH QUESTIONNAIRE - PHQ9: SUM OF ALL RESPONSES TO PHQ QUESTIONS 1-9: 2

## 2021-11-20 NOTE — PROGRESS NOTES
"  SUBJECTIVE:                                                   CC:  Postpartum visit    HPI:  Alexi Montemayor is a 36 year old  s/p  6 weeks ago who presents for postpartum follow up.      History dep/anx, on citalopram, currently doing well  Had declined covid vax in pregnancy, now planning to get it before she can go back to work at [employer].   Breastfeeding didn't really work out  Condoms for birth control  Pap up to date    Wt Readings from Last 3 Encounters:   21 94.8 kg (209 lb)   10/27/21 90.7 kg (200 lb)   10/07/21 102.5 kg (226 lb)     PHQ-9 SCORE 12/15/2020 3/8/2021 2021   PHQ-9 Total Score MyChart 3 (Minimal depression) 4 (Minimal depression) -   PHQ-9 Total Score 3 4 2     AYDIN-7 SCORE 2019 2019 12/15/2020   Total Score - - 0 (minimal anxiety)   Total Score 5 13 0         ROS: 10 point ROS negative other than as listed above in HPI.       Last 3 Pap and HPV Results:   PAP / HPV Latest Ref Rng & Units 12/15/2020   PAP (Historical) - NIL   HPV16 NEG:Negative Negative   HPV18 NEG:Negative Negative   HRHPV NEG:Negative Negative         PMH, PSH, Soc Hx, Fam Hx, Meds, and allergies reviewed in Epic.    OBJECTIVE:     /80 (BP Location: Right arm, Patient Position: Chair, Cuff Size: Adult Large)   Ht 1.6 m (5' 3\")   Wt 94.8 kg (209 lb)   LMP 2021   Breastfeeding No   BMI 37.02 kg/m        Gen: Healthy appearing female, no acute distress, comfortable.  Well groomed, good eye contact, loving toward infant.    HENT: No scleral injection or icterus  CV: Regular rate  Resp: Normal work of breathing, no cough  Psychiatric: mentation appears normal and affect bright      ASSESSMENT/PLAN:                                                      1. Routine postpartum follow-up  Reviewed importance of covid vaccine.  Doing well, see HPI.  Discussed risk of postpartum anxiety/depression for up to a year.  Condoms for contraception.    RTC annually.     Diana Sanders MD, " MPH  Obstetrics and Gynecology

## 2022-03-02 ENCOUNTER — TELEPHONE (OUTPATIENT)
Dept: OBGYN | Facility: CLINIC | Age: 37
End: 2022-03-02
Payer: COMMERCIAL

## 2022-03-02 NOTE — TELEPHONE ENCOUNTER
----- Message from Diana Sanders MD sent at 3/2/2022  7:39 AM CST -----  Could you please place a sports med referral? She is having pelvic pain since delivery in 10/21.    Thanks

## 2022-04-04 NOTE — PROGRESS NOTES
ASSESSMENT & PLAN  Patient Instructions     1. Pain in joint involving pelvic region and thigh, unspecified laterality      -Patient has chronic pelvic pain due to inflammation of the pubic symphysis joint after pregnancy and vaginal delivery  -Patient will start formal physical therapy and home exercise program for pelvic floor strengthening and stabilization  -Patient may take 1-2 or 3 tablets of ibuprofen twice a day as needed for pain.  Patient may also take 20 or 40 mg of omeprazole when she is taking the oral anti-inflammatory medications to minimize stomach irritation  -Patient will follow up if pain does not improve  -Call direct clinic number [[phone]] at any time with questions or concerns.    Albert Yeo MD [facility]  [facility]          -----    SUBJECTIVE  Alexi Montemayor is a/an 36 year old female who is seen in consultation at the request of  Diana Sanders M.D. for evaluation of pubic/pelvis pain. The patient is seen by themselves.    Onset: 10 month(s) ago. Reports insidious onset while 5 months pregnant  Location of Pain: midline pubic symphysis    Rating of Pain at worst: 7/10  Rating of Pain Currently: 0/10 at rest, 7/10 with specific activities   Worsened by: walking/knee extension with hip flexion, running, pressure on the area   Better with: activity avoidance   Treatments tried: sleeping with pillow between legs   Quality: dull ache, shooting   Associated symptoms: no distal numbness or tingling; denies swelling or warmth  Orthopedic history: NO  Relevant surgical history: NO  Social history: social history: works as pharmacy tech     Past Medical History:   Diagnosis Date     Anxiety      Depression      Depressive disorder      Social History     Socioeconomic History     Marital status:      Spouse name: Miguel Monzon     Number of children: 1     Years of education: Not on file     Highest education level: High school  "graduate   Occupational History     Occupation: pharmacy tech   Tobacco Use     Smoking status: Never Smoker     Smokeless tobacco: Never Used   Vaping Use     Vaping Use: Never used   Substance and Sexual Activity     Alcohol use: Not Currently     Comment: Rarely     Drug use: No     Sexual activity: Yes     Partners: Male   Other Topics Concern     Parent/sibling w/ CABG, MI or angioplasty before 65F 55M? No   Social History Narrative     Not on file     Social Determinants of Health     Financial Resource Strain: Low Risk      Difficulty of Paying Living Expenses: Not hard at all   Food Insecurity: No Food Insecurity     Worried About Running Out of Food in the Last Year: Never true     Ran Out of Food in the Last Year: Never true   Transportation Needs: No Transportation Needs     Lack of Transportation (Medical): No     Lack of Transportation (Non-Medical): No   Physical Activity: Not on file   Stress: Not on file   Social Connections: Not on file   Intimate Partner Violence: Not on file   Housing Stability: Not on file         Patient's past medical, surgical, social, and family histories were reviewed today and no changes are noted.    REVIEW OF SYSTEMS:  10 point ROS is negative other than symptoms noted above in HPI, Past Medical History or as stated below  Constitutional: NEGATIVE for fever, chills, change in weight  Skin: NEGATIVE for worrisome rashes, moles or lesions  GI/: NEGATIVE for bowel or bladder changes  Neuro: NEGATIVE for weakness, dizziness or paresthesias    OBJECTIVE:  /82   Ht 1.6 m (5' 3\")   Wt 94.3 kg (208 lb)   BMI 36.85 kg/m     General: healthy, alert and in no distress  HEENT: no scleral icterus or conjunctival erythema  Skin: no suspicious lesions or rash. No jaundice.  CV: no pedal edema  Resp: normal respiratory effort without conversational dyspnea   Psych: normal mood and affect  Gait: normal steady gait with appropriate coordination and balance  Neuro: Normal light " sensory exam of lower extremity  MSK:  BILATERAL HIP  Inspection:    No obvious deformity or asymmetry, level pelvis  Palpation:    Tender about the pubic symphysis. Otherwise all other landmarks are nontender.  Active Range of Motion:     Flexion limited slightly by pain, IR limited slightly by pain, ER  limited slightly by pain  Strength:    Flexion painful, adduction grossly intact, abduction grossly intact  Special Tests:    Positive: anterior impingement (FADIR), posterior impingement (EX/AB/ER)    Negative: Logroll, resisted gluteus medius provocation, SERENE    Independent visualization of the below image:  No results found for this or any previous visit (from the past 24 hour(s)).    Personal review of pelvis x-rays taken office today show no acute fracture, widening or diastases or osseous abnormalities.        Albert Yeo MD [facility] Orthopedic Care

## 2022-04-08 ENCOUNTER — ANCILLARY PROCEDURE (OUTPATIENT)
Dept: GENERAL RADIOLOGY | Facility: CLINIC | Age: 37
End: 2022-04-08
Attending: FAMILY MEDICINE
Payer: COMMERCIAL

## 2022-04-08 ENCOUNTER — OFFICE VISIT (OUTPATIENT)
Dept: ORTHOPEDICS | Facility: CLINIC | Age: 37
End: 2022-04-08
Payer: COMMERCIAL

## 2022-04-08 VITALS
HEIGHT: 63 IN | WEIGHT: 208 LBS | BODY MASS INDEX: 36.86 KG/M2 | SYSTOLIC BLOOD PRESSURE: 130 MMHG | DIASTOLIC BLOOD PRESSURE: 82 MMHG

## 2022-04-08 DIAGNOSIS — M25.559 PAIN IN JOINT INVOLVING PELVIC REGION AND THIGH, UNSPECIFIED LATERALITY: Primary | ICD-10-CM

## 2022-04-08 DIAGNOSIS — M25.559 PAIN IN JOINT, PELVIC REGION AND THIGH: ICD-10-CM

## 2022-04-08 PROCEDURE — 99204 OFFICE O/P NEW MOD 45 MIN: CPT | Performed by: FAMILY MEDICINE

## 2022-04-08 PROCEDURE — 72170 X-RAY EXAM OF PELVIS: CPT | Performed by: RADIOLOGY

## 2022-04-08 NOTE — LETTER
4/8/2022         RE: Alexi Montemayor  [id number] [address] [id number]        Dear Colleague,    Thank you for referring your patient, Alexi Montemayor, to the [facility] [place]. Please see a copy of my visit note below.    ASSESSMENT & PLAN  Patient Instructions     1. Pain in joint involving pelvic region and thigh, unspecified laterality      -Patient has chronic pelvic pain due to inflammation of the pubic symphysis joint after pregnancy and vaginal delivery  -Patient will start formal physical therapy and home exercise program for pelvic floor strengthening and stabilization  -Patient may take 1-2 or 3 tablets of ibuprofen twice a day as needed for pain.  Patient may also take 20 or 40 mg of omeprazole when she is taking the oral anti-inflammatory medications to minimize stomach irritation  -Patient will follow up if pain does not improve  -Call direct clinic number [[phone]] at any time with questions or concerns.    Albert Yeo MD [facility] Orth[facility] and [facility] Medicine  [facility]          -----    SUBJECTIVE  Alexi Montemayor is a/an 36 year old female who is seen in consultation at the request of  Diana Sanders M.D. for evaluation of pubic/pelvis pain. The patient is seen by themselves.    Onset: 10 month(s) ago. Reports insidious onset while 5 months pregnant  Location of Pain: midline pubic symphysis    Rating of Pain at worst: 7/10  Rating of Pain Currently: 0/10 at rest, 7/10 with specific activities   Worsened by: walking/knee extension with hip flexion, running, pressure on the area   Better with: activity avoidance   Treatments tried: sleeping with pillow between legs   Quality: dull ache, shooting   Associated symptoms: no distal numbness or tingling; denies swelling or warmth  Orthopedic history: NO  Relevant surgical history: NO  Social history: social history: works as pharmacy tech     Past Medical History:  "  Diagnosis Date     Anxiety      Depression      Depressive disorder      Social History     Socioeconomic History     Marital status:      Spouse name: Miguel Monzon     Number of children: 1     Years of education: Not on file     Highest education level: High school graduate   Occupational History     Occupation: pharmacy tech   Tobacco Use     Smoking status: Never Smoker     Smokeless tobacco: Never Used   Vaping Use     Vaping Use: Never used   Substance and Sexual Activity     Alcohol use: Not Currently     Comment: Rarely     Drug use: No     Sexual activity: Yes     Partners: Male   Other Topics Concern     Parent/sibling w/ CABG, MI or angioplasty before 65F 55M? No   Social History Narrative     Not on file     Social Determinants of Health     Financial Resource Strain: Low Risk      Difficulty of Paying Living Expenses: Not hard at all   Food Insecurity: No Food Insecurity     Worried About Running Out of Food in the Last Year: Never true     Ran Out of Food in the Last Year: Never true   Transportation Needs: No Transportation Needs     Lack of Transportation (Medical): No     Lack of Transportation (Non-Medical): No   Physical Activity: Not on file   Stress: Not on file   Social Connections: Not on file   Intimate Partner Violence: Not on file   Housing Stability: Not on file         Patient's past medical, surgical, social, and family histories were reviewed today and no changes are noted.    REVIEW OF SYSTEMS:  10 point ROS is negative other than symptoms noted above in HPI, Past Medical History or as stated below  Constitutional: NEGATIVE for fever, chills, change in weight  Skin: NEGATIVE for worrisome rashes, moles or lesions  GI/: NEGATIVE for bowel or bladder changes  Neuro: NEGATIVE for weakness, dizziness or paresthesias    OBJECTIVE:  /82   Ht 1.6 m (5' 3\")   Wt 94.3 kg (208 lb)   BMI 36.85 kg/m     General: healthy, alert and in no distress  HEENT: no scleral icterus or " conjunctival erythema  Skin: no suspicious lesions or rash. No jaundice.  CV: no pedal edema  Resp: normal respiratory effort without conversational dyspnea   Psych: normal mood and affect  Gait: normal steady gait with appropriate coordination and balance  Neuro: Normal light sensory exam of lower extremity  MSK:  BILATERAL HIP  Inspection:    No obvious deformity or asymmetry, level pelvis  Palpation:    Tender about the pubic symphysis. Otherwise all other landmarks are nontender.  Active Range of Motion:     Flexion limited slightly by pain, IR limited slightly by pain, ER  limited slightly by pain  Strength:    Flexion painful, adduction grossly intact, abduction grossly intact  Special Tests:    Positive: anterior impingement (FADIR), posterior impingement (EX/AB/ER)    Negative: Logroll, resisted gluteus medius provocation, SERENE    Independent visualization of the below image:  No results found for this or any previous visit (from the past 24 hour(s)).    Personal review of pelvis x-rays taken office today show no acute fracture, widening or diastases or osseous abnormalities.        Albert Yeo MD [facility] Orthopedic Care        Again, thank you for allowing me to participate in the care of your patient.        Sincerely,        Albert Yeo, MD

## 2022-04-08 NOTE — PATIENT INSTRUCTIONS
1. Pain in joint involving pelvic region and thigh, unspecified laterality      -Patient has chronic pelvic pain due to inflammation of the pubic symphysis joint after pregnancy and vaginal delivery  -Patient will start formal physical therapy and home exercise program for pelvic floor strengthening and stabilization  -Patient may take 1-2 or 3 tablets of ibuprofen twice a day as needed for pain.  Patient may also take 20 or 40 mg of omeprazole when she is taking the oral anti-inflammatory medications to minimize stomach irritation  -Patient will follow up if pain does not improve  -Call direct clinic number [[phone]] at any time with questions or concerns.    Albert Yeo MD CAQSM  [place] [facility]  [facility] [place]

## 2022-09-11 ENCOUNTER — HEALTH MAINTENANCE LETTER (OUTPATIENT)
Age: 37
End: 2022-09-11

## 2023-01-22 ENCOUNTER — HEALTH MAINTENANCE LETTER (OUTPATIENT)
Age: 38
End: 2023-01-22

## 2023-08-14 SDOH — ECONOMIC STABILITY: INCOME INSECURITY: HOW HARD IS IT FOR YOU TO PAY FOR THE VERY BASICS LIKE FOOD, HOUSING, MEDICAL CARE, AND HEATING?: NOT VERY HARD

## 2023-08-14 SDOH — ECONOMIC STABILITY: FOOD INSECURITY: WITHIN THE PAST 12 MONTHS, THE FOOD YOU BOUGHT JUST DIDN'T LAST AND YOU DIDN'T HAVE MONEY TO GET MORE.: NEVER TRUE

## 2023-08-14 SDOH — ECONOMIC STABILITY: INCOME INSECURITY: IN THE LAST 12 MONTHS, WAS THERE A TIME WHEN YOU WERE NOT ABLE TO PAY THE MORTGAGE OR RENT ON TIME?: NO

## 2023-08-14 SDOH — HEALTH STABILITY: PHYSICAL HEALTH: ON AVERAGE, HOW MANY MINUTES DO YOU ENGAGE IN EXERCISE AT THIS LEVEL?: 30 MIN

## 2023-08-14 SDOH — HEALTH STABILITY: PHYSICAL HEALTH: ON AVERAGE, HOW MANY DAYS PER WEEK DO YOU ENGAGE IN MODERATE TO STRENUOUS EXERCISE (LIKE A BRISK WALK)?: 3 DAYS

## 2023-08-14 SDOH — ECONOMIC STABILITY: FOOD INSECURITY: WITHIN THE PAST 12 MONTHS, YOU WORRIED THAT YOUR FOOD WOULD RUN OUT BEFORE YOU GOT MONEY TO BUY MORE.: NEVER TRUE

## 2023-08-14 ASSESSMENT — ENCOUNTER SYMPTOMS
ABDOMINAL PAIN: 0
HEARTBURN: 0
HEMATURIA: 0
CHILLS: 0
JOINT SWELLING: 0
PARESTHESIAS: 0
WEAKNESS: 0
COUGH: 0
ARTHRALGIAS: 1
EYE PAIN: 0
NAUSEA: 0
PALPITATIONS: 0
DIZZINESS: 0
SORE THROAT: 0
HEADACHES: 0
DYSURIA: 0
MYALGIAS: 1
DIARRHEA: 0
SHORTNESS OF BREATH: 0
FREQUENCY: 0
NERVOUS/ANXIOUS: 0
FEVER: 0
BREAST MASS: 0
CONSTIPATION: 0
HEMATOCHEZIA: 0

## 2023-08-14 ASSESSMENT — SOCIAL DETERMINANTS OF HEALTH (SDOH)
HOW OFTEN DO YOU GET TOGETHER WITH FRIENDS OR RELATIVES?: MORE THAN THREE TIMES A WEEK
IN A TYPICAL WEEK, HOW MANY TIMES DO YOU TALK ON THE PHONE WITH FAMILY, FRIENDS, OR NEIGHBORS?: TWICE A WEEK
HOW OFTEN DO YOU ATTEND CHURCH OR RELIGIOUS SERVICES?: 1 TO 4 TIMES PER YEAR
DO YOU BELONG TO ANY CLUBS OR ORGANIZATIONS SUCH AS CHURCH GROUPS UNIONS, FRATERNAL OR ATHLETIC GROUPS, OR SCHOOL GROUPS?: NO

## 2023-08-14 ASSESSMENT — LIFESTYLE VARIABLES
SKIP TO QUESTIONS 9-10: 1
HOW MANY STANDARD DRINKS CONTAINING ALCOHOL DO YOU HAVE ON A TYPICAL DAY: 1 OR 2
HOW OFTEN DO YOU HAVE A DRINK CONTAINING ALCOHOL: MONTHLY OR LESS
AUDIT-C TOTAL SCORE: 1
HOW OFTEN DO YOU HAVE SIX OR MORE DRINKS ON ONE OCCASION: NEVER

## 2023-08-18 ENCOUNTER — OFFICE VISIT (OUTPATIENT)
Dept: FAMILY MEDICINE | Facility: CLINIC | Age: 38
End: 2023-08-18
Payer: COMMERCIAL

## 2023-08-18 VITALS
WEIGHT: 194.6 LBS | TEMPERATURE: 98.3 F | RESPIRATION RATE: 16 BRPM | DIASTOLIC BLOOD PRESSURE: 72 MMHG | HEART RATE: 84 BPM | HEIGHT: 63 IN | OXYGEN SATURATION: 97 % | BODY MASS INDEX: 34.48 KG/M2 | SYSTOLIC BLOOD PRESSURE: 110 MMHG

## 2023-08-18 DIAGNOSIS — M05.80 POLYARTHRITIS WITH POSITIVE RHEUMATOID FACTOR (H): ICD-10-CM

## 2023-08-18 DIAGNOSIS — Z11.59 NEED FOR HEPATITIS C SCREENING TEST: ICD-10-CM

## 2023-08-18 DIAGNOSIS — Z83.2 FAMILY HISTORY OF AUTOIMMUNE DISORDER: ICD-10-CM

## 2023-08-18 DIAGNOSIS — M25.50 MULTIPLE JOINT PAIN: ICD-10-CM

## 2023-08-18 DIAGNOSIS — F33.1 MAJOR DEPRESSIVE DISORDER, RECURRENT EPISODE, MODERATE WITH ANXIOUS DISTRESS (H): ICD-10-CM

## 2023-08-18 DIAGNOSIS — Z00.00 ROUTINE GENERAL MEDICAL EXAMINATION AT A HEALTH CARE FACILITY: Primary | ICD-10-CM

## 2023-08-18 PROBLEM — O09.511 SUPERVISION OF ELDERLY PRIMIGRAVIDA IN FIRST TRIMESTER: Status: RESOLVED | Noted: 2021-03-26 | Resolved: 2023-08-18

## 2023-08-18 PROBLEM — Z36.89 ENCOUNTER FOR TRIAGE IN PREGNANT PATIENT: Status: RESOLVED | Noted: 2021-10-07 | Resolved: 2023-08-18

## 2023-08-18 PROBLEM — Z34.80 SUPERVISION OF OTHER NORMAL PREGNANCY, ANTEPARTUM: Status: RESOLVED | Noted: 2021-03-26 | Resolved: 2023-08-18

## 2023-08-18 LAB
ALBUMIN SERPL BCG-MCNC: 4.5 G/DL (ref 3.5–5.2)
ALP SERPL-CCNC: 53 U/L (ref 35–104)
ALT SERPL W P-5'-P-CCNC: 11 U/L (ref 0–50)
ANION GAP SERPL CALCULATED.3IONS-SCNC: 12 MMOL/L (ref 7–15)
AST SERPL W P-5'-P-CCNC: 20 U/L (ref 0–45)
BILIRUB SERPL-MCNC: 0.2 MG/DL
BUN SERPL-MCNC: 13.6 MG/DL (ref 6–20)
CALCIUM SERPL-MCNC: 9.3 MG/DL (ref 8.6–10)
CHLORIDE SERPL-SCNC: 105 MMOL/L (ref 98–107)
CREAT SERPL-MCNC: 0.77 MG/DL (ref 0.51–0.95)
CRP SERPL-MCNC: 6.13 MG/L
DEPRECATED HCO3 PLAS-SCNC: 24 MMOL/L (ref 22–29)
ERYTHROCYTE [DISTWIDTH] IN BLOOD BY AUTOMATED COUNT: 13.2 % (ref 10–15)
ERYTHROCYTE [SEDIMENTATION RATE] IN BLOOD BY WESTERGREN METHOD: 23 MM/HR (ref 0–20)
GFR SERPL CREATININE-BSD FRML MDRD: >90 ML/MIN/1.73M2
GLUCOSE SERPL-MCNC: 91 MG/DL (ref 70–99)
HBA1C MFR BLD: 5.4 % (ref 0–5.6)
HCT VFR BLD AUTO: 37.5 % (ref 35–47)
HGB BLD-MCNC: 12.1 G/DL (ref 11.7–15.7)
MCH RBC QN AUTO: 25.6 PG (ref 26.5–33)
MCHC RBC AUTO-ENTMCNC: 32.3 G/DL (ref 31.5–36.5)
MCV RBC AUTO: 79 FL (ref 78–100)
PLATELET # BLD AUTO: 278 10E3/UL (ref 150–450)
POTASSIUM SERPL-SCNC: 4 MMOL/L (ref 3.4–5.3)
PROT SERPL-MCNC: 7.6 G/DL (ref 6.4–8.3)
RBC # BLD AUTO: 4.73 10E6/UL (ref 3.8–5.2)
SODIUM SERPL-SCNC: 141 MMOL/L (ref 136–145)
TSH SERPL DL<=0.005 MIU/L-ACNC: 2.08 UIU/ML (ref 0.3–4.2)
WBC # BLD AUTO: 7.1 10E3/UL (ref 4–11)

## 2023-08-18 PROCEDURE — 99213 OFFICE O/P EST LOW 20 MIN: CPT | Mod: 25 | Performed by: FAMILY MEDICINE

## 2023-08-18 PROCEDURE — 86038 ANTINUCLEAR ANTIBODIES: CPT | Performed by: FAMILY MEDICINE

## 2023-08-18 PROCEDURE — 96127 BRIEF EMOTIONAL/BEHAV ASSMT: CPT | Performed by: FAMILY MEDICINE

## 2023-08-18 PROCEDURE — 36415 COLL VENOUS BLD VENIPUNCTURE: CPT | Performed by: FAMILY MEDICINE

## 2023-08-18 PROCEDURE — 99385 PREV VISIT NEW AGE 18-39: CPT | Performed by: FAMILY MEDICINE

## 2023-08-18 PROCEDURE — 86140 C-REACTIVE PROTEIN: CPT | Performed by: FAMILY MEDICINE

## 2023-08-18 PROCEDURE — 85027 COMPLETE CBC AUTOMATED: CPT | Performed by: FAMILY MEDICINE

## 2023-08-18 PROCEDURE — 85652 RBC SED RATE AUTOMATED: CPT | Performed by: FAMILY MEDICINE

## 2023-08-18 PROCEDURE — 84443 ASSAY THYROID STIM HORMONE: CPT | Performed by: FAMILY MEDICINE

## 2023-08-18 PROCEDURE — 80053 COMPREHEN METABOLIC PANEL: CPT | Performed by: FAMILY MEDICINE

## 2023-08-18 PROCEDURE — 86200 CCP ANTIBODY: CPT | Performed by: FAMILY MEDICINE

## 2023-08-18 PROCEDURE — 86431 RHEUMATOID FACTOR QUANT: CPT | Performed by: FAMILY MEDICINE

## 2023-08-18 PROCEDURE — 86803 HEPATITIS C AB TEST: CPT | Performed by: FAMILY MEDICINE

## 2023-08-18 PROCEDURE — 83036 HEMOGLOBIN GLYCOSYLATED A1C: CPT | Performed by: FAMILY MEDICINE

## 2023-08-18 RX ORDER — MULTIPLE VITAMINS W/ MINERALS TAB 9MG-400MCG
1 TAB ORAL DAILY
COMMUNITY

## 2023-08-18 ASSESSMENT — ENCOUNTER SYMPTOMS
ARTHRALGIAS: 1
CHILLS: 0
FREQUENCY: 0
SORE THROAT: 0
HEMATURIA: 0
NAUSEA: 0
HEADACHES: 0
COUGH: 0
CONSTIPATION: 0
PARESTHESIAS: 0
WEAKNESS: 0
FEVER: 0
NERVOUS/ANXIOUS: 0
DYSURIA: 0
DIZZINESS: 0
PALPITATIONS: 0
HEARTBURN: 0
EYE PAIN: 0
JOINT SWELLING: 0
ABDOMINAL PAIN: 0
BREAST MASS: 0
SHORTNESS OF BREATH: 0
MYALGIAS: 1
HEMATOCHEZIA: 0
DIARRHEA: 0

## 2023-08-18 ASSESSMENT — ANXIETY QUESTIONNAIRES
7. FEELING AFRAID AS IF SOMETHING AWFUL MIGHT HAPPEN: NOT AT ALL
IF YOU CHECKED OFF ANY PROBLEMS ON THIS QUESTIONNAIRE, HOW DIFFICULT HAVE THESE PROBLEMS MADE IT FOR YOU TO DO YOUR WORK, TAKE CARE OF THINGS AT HOME, OR GET ALONG WITH OTHER PEOPLE: NOT DIFFICULT AT ALL
1. FEELING NERVOUS, ANXIOUS, OR ON EDGE: NOT AT ALL
4. TROUBLE RELAXING: NOT AT ALL
6. BECOMING EASILY ANNOYED OR IRRITABLE: SEVERAL DAYS
GAD7 TOTAL SCORE: 1
2. NOT BEING ABLE TO STOP OR CONTROL WORRYING: NOT AT ALL
3. WORRYING TOO MUCH ABOUT DIFFERENT THINGS: NOT AT ALL
GAD7 TOTAL SCORE: 1
5. BEING SO RESTLESS THAT IT IS HARD TO SIT STILL: NOT AT ALL

## 2023-08-18 ASSESSMENT — PATIENT HEALTH QUESTIONNAIRE - PHQ9
SUM OF ALL RESPONSES TO PHQ QUESTIONS 1-9: 3
SUM OF ALL RESPONSES TO PHQ QUESTIONS 1-9: 3
10. IF YOU CHECKED OFF ANY PROBLEMS, HOW DIFFICULT HAVE THESE PROBLEMS MADE IT FOR YOU TO DO YOUR WORK, TAKE CARE OF THINGS AT HOME, OR GET ALONG WITH OTHER PEOPLE: NOT DIFFICULT AT ALL

## 2023-08-18 NOTE — PROGRESS NOTES
SUBJECTIVE:   CC: Alexi is an 38 year old who presents for preventive health visit.       8/18/2023     2:44 PM   Additional Questions   Roomed by Dori Chopra     Here for wellness visit.    Wants to work on weight loss.    Will simultaneously get joint pains in the elbows, wrists, and knees, FH of MS in mother, autoimmune disease in sister, maternal uncle.  Lots of muscle tightness in low back.    Healthy Habits:     Getting at least 3 servings of Calcium per day:  Yes    Bi-annual eye exam:  Yes    Dental care twice a year:  Yes    Sleep apnea or symptoms of sleep apnea:  None    Diet:  Regular (no restrictions)    Frequency of exercise:  1 day/week    Duration of exercise:  15-30 minutes    Taking medications regularly:  Yes    Medication side effects:  None    Additional concerns today:  Yes      Today's PHQ-9 Score:       8/18/2023     2:43 PM   PHQ-9 SCORE   PHQ-9 Total Score MyChart 3 (Minimal depression)   PHQ-9 Total Score 3       Have you ever done Advance Care Planning? (For example, a Health Directive, POLST, or a discussion with a medical provider or your loved ones about your wishes): No, advance care planning information given to patient to review.  Patient declined advance care planning discussion at this time.    Social History     Tobacco Use    Smoking status: Never    Smokeless tobacco: Never   Substance Use Topics    Alcohol use: Not Currently     Comment: Rarely           8/14/2023     2:33 PM   Alcohol Use   Prescreen: >3 drinks/day or >7 drinks/week? No     Reviewed orders with patient.  Reviewed health maintenance and updated orders accordingly - Yes  Lab work is in process  Labs reviewed in EPIC  BP Readings from Last 3 Encounters:   08/18/23 110/72   04/08/22 130/82   11/17/21 110/80    Wt Readings from Last 3 Encounters:   08/18/23 88.3 kg (194 lb 9.6 oz)   04/08/22 94.3 kg (208 lb)   11/17/21 94.8 kg (209 lb)                  Patient Active Problem List   Diagnosis    Anxiety     Major depressive disorder, recurrent episode, moderate with anxious distress (H)    Panic disorder without agoraphobia     Past Surgical History:   Procedure Laterality Date    EXTRACTION(S) DENTAL      Skin mole removal         Social History     Tobacco Use    Smoking status: Never    Smokeless tobacco: Never   Substance Use Topics    Alcohol use: Not Currently     Comment: Rarely     Family History   Problem Relation Age of Onset    Cervical Cancer Mother     Multiple Sclerosis Mother     Vaginal Cancer Mother     Anxiety Disorder Mother     Diabetes Father     Hypertension Father     Alcoholism Father     Substance Abuse Father         alcoholic    Obesity Father     Depression Sister     Anxiety Disorder Sister     Obesity Sister     Other - See Comments Sister         MOG- like MS    Diabetes Type 2  Maternal Grandmother     Hypertension Maternal Grandmother     Schizophrenia Maternal Aunt     Rheumatoid Arthritis Maternal Uncle     Mental Illness Cousin     Glaucoma No family hx of     Macular Degeneration No family hx of     Colon Cancer No family hx of     Breast Cancer No family hx of          Current Outpatient Medications   Medication Sig Dispense Refill    citalopram (CELEXA) 20 MG tablet Take 2 tablets (40 mg) by mouth daily  1    multivitamin w/minerals (THERA-VIT-M) tablet Take 1 tablet by mouth daily       Allergies   Allergen Reactions    Pcn [Penicillins] Hives    Sulfa Antibiotics Hives     Recent Labs   Lab Test 10/27/21  2245 12/15/20  1643 10/12/18  0819 18  1254 17  1036   A1C  --  5.3  --   --   --    LDL  --   --   --   --  100   HDL  --   --   --   --  44*   TRIG  --   --   --   --  46   ALT 41  --   --   --   --    CR 0.86  --  0.69  --   --    GFRESTIMATED 87  --  >90  --   --    GFRESTBLACK  --   --  >90  --   --    POTASSIUM 3.7  --  3.5  --   --    TSH  --  1.28  --  0.88  --         Breast Cancer Screenin/14/2023     2:39 PM   Breast CA Risk Assessment (FHS-7)    Do you have a family history of breast, colon, or ovarian cancer? No / Unknown         Patient under 40 years of age: Routine Mammogram Screening not recommended.   Pertinent mammograms are reviewed under the imaging tab.    History of abnormal Pap smear: NO - age 30-65 PAP every 5 years with negative HPV co-testing recommended      Latest Ref Rng & Units 12/15/2020     4:44 PM 12/15/2020     3:12 PM   PAP / HPV   PAP (Historical)   NIL    HPV 16 DNA NEG^Negative Negative     HPV 18 DNA NEG^Negative Negative     Other HR HPV NEG^Negative Negative       Reviewed and updated as needed this visit by clinical staff   Tobacco  Allergies  Meds              Reviewed and updated as needed this visit by Provider                 Past Medical History:   Diagnosis Date    Anxiety     Depression     Depressive disorder       Past Surgical History:   Procedure Laterality Date    EXTRACTION(S) DENTAL      Skin mole removal         Review of Systems   Constitutional:  Negative for chills and fever.   HENT:  Negative for congestion, ear pain, hearing loss and sore throat.    Eyes:  Negative for pain and visual disturbance.   Respiratory:  Negative for cough and shortness of breath.    Cardiovascular:  Negative for chest pain, palpitations and peripheral edema.   Gastrointestinal:  Negative for abdominal pain, constipation, diarrhea, heartburn, hematochezia and nausea.   Breasts:  Negative for tenderness, breast mass and discharge.   Genitourinary:  Negative for dysuria, frequency, genital sores, hematuria, pelvic pain, urgency, vaginal bleeding and vaginal discharge.   Musculoskeletal:  Positive for arthralgias and myalgias. Negative for joint swelling.   Skin:  Negative for rash.   Neurological:  Negative for dizziness, weakness, headaches and paresthesias.   Psychiatric/Behavioral:  Negative for mood changes. The patient is not nervous/anxious.           OBJECTIVE:   /72 (BP Location: Right arm, Patient Position:  "Sitting, Cuff Size: Adult Regular)   Pulse 84   Temp 98.3  F (36.8  C) (Oral)   Resp 16   Ht 1.6 m (5' 3\")   Wt 88.3 kg (194 lb 9.6 oz)   LMP 07/18/2023 (Approximate)   SpO2 97%   BMI 34.47 kg/m    Physical Exam  GENERAL: healthy, alert and no distress  EYES: Eyes grossly normal to inspection, PERRL and conjunctivae and sclerae normal  HENT: ear canals and TM's normal, nose and mouth without ulcers or lesions  NECK: no adenopathy, no asymmetry, masses, or scars and thyroid normal to palpation  RESP: lungs clear to auscultation - no rales, rhonchi or wheezes  BREAST: normal without masses, tenderness or nipple discharge and no palpable axillary masses or adenopathy  CV: regular rate and rhythm, normal S1 S2, no S3 or S4, no murmur, click or rub, no peripheral edema and peripheral pulses strong  ABDOMEN: soft, nontender, no hepatosplenomegaly, no masses and bowel sounds normal  MS: no gross musculoskeletal defects noted, no edema  SKIN: no suspicious lesions or rashes  NEURO: Normal strength and tone, mentation intact and speech normal  PSYCH: mentation appears normal, affect normal/bright    Diagnostic Test Results:  Labs reviewed in Epic    ASSESSMENT/PLAN:       ICD-10-CM    1. Routine general medical examination at a health care facility  Z00.00 CBC with platelets     Comprehensive metabolic panel (BMP + Alb, Alk Phos, ALT, AST, Total. Bili, TP)     Hemoglobin A1c     TSH with free T4 reflex     CBC with platelets     Comprehensive metabolic panel (BMP + Alb, Alk Phos, ALT, AST, Total. Bili, TP)     Hemoglobin A1c     TSH with free T4 reflex      2. Major depressive disorder, recurrent episode, moderate with anxious distress (H)  F33.1 Continue Citalopram, follow up in 6 months or sooner as needed.      3. Multiple joint pain  M25.50 Rheumatoid factor     Anti Nuclear Dominique IgG by IFA with Reflex     ESR: Erythrocyte sedimentation rate     CRP, inflammation     Rheumatoid factor     Anti Nuclear Dominique IgG by " IFA with Reflex     ESR: Erythrocyte sedimentation rate     CRP, inflammation      4. Family history of autoimmune disorder  Z83.2 Rheumatoid factor     Anti Nuclear Dominique IgG by IFA with Reflex     ESR: Erythrocyte sedimentation rate     CRP, inflammation     Rheumatoid factor     Anti Nuclear Dominique IgG by IFA with Reflex     ESR: Erythrocyte sedimentation rate     CRP, inflammation      5. Need for hepatitis C screening test  Z11.59 Hepatitis C Screen Reflex to HCV RNA Quant and Genotype     Hepatitis C Screen Reflex to HCV RNA Quant and Genotype          Patient has been advised of split billing requirements and indicates understanding: Yes      COUNSELING:  Reviewed preventive health counseling, as reflected in patient instructions       Regular exercise       Healthy diet/nutrition        She reports that she has never smoked. She has never used smokeless tobacco.          Rosita Neely MD  [facility] submitted by the patient for this visit:  Patient Health Questionnaire (Submitted on 8/18/2023)  If you checked off any problems, how difficult have these problems made it for you to do your work, take care of things at home, or get along with other people?: Not difficult at all  PHQ9 TOTAL SCORE: 3  AYDIN-7 (Submitted on 8/18/2023)  AYDIN 7 TOTAL SCORE: 1

## 2023-08-19 LAB — HCV AB SERPL QL IA: NONREACTIVE

## 2023-08-21 ENCOUNTER — MYC MEDICAL ADVICE (OUTPATIENT)
Dept: FAMILY MEDICINE | Facility: CLINIC | Age: 38
End: 2023-08-21
Payer: COMMERCIAL

## 2023-08-21 DIAGNOSIS — R70.0 ELEVATED ERYTHROCYTE SEDIMENTATION RATE: ICD-10-CM

## 2023-08-21 DIAGNOSIS — R76.8 RHEUMATOID FACTOR POSITIVE: Primary | ICD-10-CM

## 2023-08-21 DIAGNOSIS — R79.82 ELEVATED C-REACTIVE PROTEIN (CRP): ICD-10-CM

## 2023-08-21 DIAGNOSIS — M25.50 MULTIPLE JOINT PAIN: Primary | ICD-10-CM

## 2023-08-21 DIAGNOSIS — M05.80 POLYARTHRITIS WITH POSITIVE RHEUMATOID FACTOR (H): ICD-10-CM

## 2023-08-21 DIAGNOSIS — M25.50 MULTIPLE JOINT PAIN: ICD-10-CM

## 2023-08-21 LAB
ANA SER QL IF: NEGATIVE
RHEUMATOID FACT SER NEPH-ACNC: 14 IU/ML

## 2023-08-21 RX ORDER — METHYLPREDNISOLONE 4 MG
TABLET, DOSE PACK ORAL
Qty: 21 TABLET | Refills: 0 | Status: SHIPPED | OUTPATIENT
Start: 2023-08-21 | End: 2023-10-30

## 2023-08-22 NOTE — TELEPHONE ENCOUNTER
Dr. Lillian Neely   Please review my chart message, is it okay for patient to take ibuprofen?     Thank you   Caren Lizama, Registered Nurse  [facility]

## 2023-08-23 LAB — CCP AB SER IA-ACNC: 2.8 U/ML

## 2023-08-24 ENCOUNTER — MYC MEDICAL ADVICE (OUTPATIENT)
Dept: FAMILY MEDICINE | Facility: CLINIC | Age: 38
End: 2023-08-24
Payer: COMMERCIAL

## 2023-08-28 ENCOUNTER — DOCUMENTATION ONLY (OUTPATIENT)
Dept: FAMILY MEDICINE | Facility: CLINIC | Age: 38
End: 2023-08-28
Payer: COMMERCIAL

## 2023-08-28 DIAGNOSIS — Z13.220 LIPID SCREENING: Primary | ICD-10-CM

## 2023-08-28 NOTE — PROGRESS NOTES
Pt has a lab only coming up and is wanting additional fasting labs to be drawn. Please review and place future orders if needed.     Thank You,  Yarely Ferguson

## 2023-09-02 ENCOUNTER — LAB (OUTPATIENT)
Dept: LAB | Facility: CLINIC | Age: 38
End: 2023-09-02
Payer: COMMERCIAL

## 2023-09-02 DIAGNOSIS — M25.50 MULTIPLE JOINT PAIN: ICD-10-CM

## 2023-09-02 DIAGNOSIS — Z13.220 LIPID SCREENING: ICD-10-CM

## 2023-09-02 DIAGNOSIS — M05.80 POLYARTHRITIS WITH POSITIVE RHEUMATOID FACTOR (H): ICD-10-CM

## 2023-09-02 LAB — ERYTHROCYTE [SEDIMENTATION RATE] IN BLOOD BY WESTERGREN METHOD: 28 MM/HR (ref 0–20)

## 2023-09-02 PROCEDURE — 86617 LYME DISEASE ANTIBODY: CPT

## 2023-09-02 PROCEDURE — 86140 C-REACTIVE PROTEIN: CPT

## 2023-09-02 PROCEDURE — 86618 LYME DISEASE ANTIBODY: CPT

## 2023-09-02 PROCEDURE — 85652 RBC SED RATE AUTOMATED: CPT

## 2023-09-02 PROCEDURE — 36415 COLL VENOUS BLD VENIPUNCTURE: CPT

## 2023-09-02 PROCEDURE — 80061 LIPID PANEL: CPT

## 2023-09-03 ENCOUNTER — MYC MEDICAL ADVICE (OUTPATIENT)
Dept: FAMILY MEDICINE | Facility: CLINIC | Age: 38
End: 2023-09-03
Payer: COMMERCIAL

## 2023-09-03 DIAGNOSIS — R76.8 RHEUMATOID FACTOR POSITIVE: Primary | ICD-10-CM

## 2023-09-03 DIAGNOSIS — M25.50 MULTIPLE JOINT PAIN: ICD-10-CM

## 2023-09-03 LAB
CHOLEST SERPL-MCNC: 192 MG/DL
CRP SERPL-MCNC: 12 MG/L
HDLC SERPL-MCNC: 41 MG/DL
LDLC SERPL CALC-MCNC: 118 MG/DL
NONHDLC SERPL-MCNC: 151 MG/DL
TRIGL SERPL-MCNC: 164 MG/DL

## 2023-09-05 NOTE — TELEPHONE ENCOUNTER
April Coming MD Chin, please see [employer] message. Pt provides update after OV on 8/18/23 (see relevant visit notes below)      Multiple joint pain  M25.50 Rheumatoid factor        Anti Nuclear Dominique IgG by IFA with Reflex       ESR: Erythrocyte sedimentation rate       CRP, inflammation       Rheumatoid factor       Anti Nuclear Dominique IgG by IFA with Reflex       ESR: Erythrocyte sedimentation rate       CRP, inflammation     Pt informs she was not fasting for cholesterol panel. Pt requests labs be reviewed and asks if any other tests are indicated.    Routing to PCP to review and advise.  Ruma MAYBERRY RN

## 2023-09-07 LAB — B BURGDOR IGG+IGM SER QL: 1.5

## 2023-09-08 LAB
B BURGDOR IGG SERPL QL IA: 0.08 INDEX
B BURGDOR IGG SERPL QL IA: NONREACTIVE
B BURGDOR IGM SERPL QL IA: 0.34 INDEX
B BURGDOR IGM SERPL QL IA: NONREACTIVE

## 2023-09-09 ENCOUNTER — LAB (OUTPATIENT)
Dept: LAB | Facility: CLINIC | Age: 38
End: 2023-09-09
Payer: COMMERCIAL

## 2023-09-09 DIAGNOSIS — R76.8 RHEUMATOID FACTOR POSITIVE: ICD-10-CM

## 2023-09-09 DIAGNOSIS — M25.50 MULTIPLE JOINT PAIN: ICD-10-CM

## 2023-09-09 PROCEDURE — 86645 CMV ANTIBODY IGM: CPT

## 2023-09-09 PROCEDURE — 86665 EPSTEIN-BARR CAPSID VCA: CPT

## 2023-09-09 PROCEDURE — 86431 RHEUMATOID FACTOR QUANT: CPT

## 2023-09-09 PROCEDURE — 86665 EPSTEIN-BARR CAPSID VCA: CPT | Mod: 59

## 2023-09-09 PROCEDURE — 84550 ASSAY OF BLOOD/URIC ACID: CPT

## 2023-09-09 PROCEDURE — 86644 CMV ANTIBODY: CPT

## 2023-09-09 PROCEDURE — 36415 COLL VENOUS BLD VENIPUNCTURE: CPT

## 2023-09-09 PROCEDURE — 86364 TISS TRNSGLTMNASE EA IG CLAS: CPT

## 2023-09-10 LAB — URATE SERPL-MCNC: 3.9 MG/DL (ref 2.4–5.7)

## 2023-09-11 ENCOUNTER — MYC MEDICAL ADVICE (OUTPATIENT)
Dept: FAMILY MEDICINE | Facility: CLINIC | Age: 38
End: 2023-09-11
Payer: COMMERCIAL

## 2023-09-11 LAB
CMV IGG SERPL IA-ACNC: 2.2 U/ML
CMV IGG SERPL IA-ACNC: ABNORMAL
CMV IGM SERPL IA-ACNC: <8 AU/ML
CMV IGM SERPL IA-ACNC: NEGATIVE
EBV VCA IGG SER IA-ACNC: >750 U/ML
EBV VCA IGG SER IA-ACNC: POSITIVE
EBV VCA IGM SER IA-ACNC: <10 U/ML
EBV VCA IGM SER IA-ACNC: NORMAL
RHEUMATOID FACT SER NEPH-ACNC: 11 IU/ML
TTG IGA SER-ACNC: 1.7 U/ML
TTG IGG SER-ACNC: 1.1 U/ML

## 2023-09-12 NOTE — TELEPHONE ENCOUNTER
April Coming MD Chin, please see [employer] message and advise. Pt asks if it is recommended to get her 1 year old tested for CMV.    Routing to PCP to review and advise.  Ruma MAYBERRY RN

## 2023-09-15 ENCOUNTER — MYC MEDICAL ADVICE (OUTPATIENT)
Dept: FAMILY MEDICINE | Facility: CLINIC | Age: 38
End: 2023-09-15

## 2023-09-15 ENCOUNTER — LAB (OUTPATIENT)
Dept: LAB | Facility: CLINIC | Age: 38
End: 2023-09-15
Payer: COMMERCIAL

## 2023-09-15 DIAGNOSIS — Z13.220 LIPID SCREENING: Primary | ICD-10-CM

## 2023-09-15 LAB
CHOLEST SERPL-MCNC: 172 MG/DL
HDLC SERPL-MCNC: 38 MG/DL
HOLD SPECIMEN: NORMAL
HOLD SPECIMEN: NORMAL
LDLC SERPL CALC-MCNC: 118 MG/DL
NONHDLC SERPL-MCNC: 134 MG/DL
TRIGL SERPL-MCNC: 80 MG/DL

## 2023-09-15 PROCEDURE — 36415 COLL VENOUS BLD VENIPUNCTURE: CPT

## 2023-09-15 PROCEDURE — 80061 LIPID PANEL: CPT

## 2023-09-15 NOTE — PROGRESS NOTES
Patient stated to be redrawn for labs as the patient was not fasting. There was not an order for a lipid. Baldo extra tubes on 09/15/2023.

## 2023-10-13 ENCOUNTER — TRANSFERRED RECORDS (OUTPATIENT)
Dept: HEALTH INFORMATION MANAGEMENT | Facility: CLINIC | Age: 38
End: 2023-10-13
Payer: COMMERCIAL

## 2023-10-17 ENCOUNTER — HOSPITAL ENCOUNTER (EMERGENCY)
Facility: CLINIC | Age: 38
Discharge: HOME | End: 2023-10-18
Attending: EMERGENCY MEDICINE | Admitting: EMERGENCY MEDICINE
Payer: COMMERCIAL

## 2023-10-17 ENCOUNTER — APPOINTMENT (OUTPATIENT)
Dept: ULTRASOUND IMAGING | Facility: CLINIC | Age: 38
End: 2023-10-17
Attending: EMERGENCY MEDICINE
Payer: COMMERCIAL

## 2023-10-17 DIAGNOSIS — K80.20 SYMPTOMATIC CHOLELITHIASIS: ICD-10-CM

## 2023-10-17 LAB
ALBUMIN SERPL BCG-MCNC: 4.1 G/DL (ref 3.5–5.2)
ALP SERPL-CCNC: 55 U/L (ref 35–104)
ALT SERPL W P-5'-P-CCNC: 13 U/L (ref 0–50)
ANION GAP SERPL CALCULATED.3IONS-SCNC: 11 MMOL/L (ref 7–15)
AST SERPL W P-5'-P-CCNC: 15 U/L (ref 0–45)
BASO+EOS+MONOS # BLD AUTO: ABNORMAL 10*3/UL
BASO+EOS+MONOS NFR BLD AUTO: ABNORMAL %
BASOPHILS # BLD AUTO: 0 10E3/UL (ref 0–0.2)
BASOPHILS NFR BLD AUTO: 0 %
BILIRUB SERPL-MCNC: 0.2 MG/DL
BUN SERPL-MCNC: 9.5 MG/DL (ref 6–20)
CALCIUM SERPL-MCNC: 9.1 MG/DL (ref 8.6–10)
CHLORIDE SERPL-SCNC: 102 MMOL/L (ref 98–107)
CREAT SERPL-MCNC: 0.71 MG/DL (ref 0.51–0.95)
DEPRECATED HCO3 PLAS-SCNC: 25 MMOL/L (ref 22–29)
EGFRCR SERPLBLD CKD-EPI 2021: >90 ML/MIN/1.73M2
EOSINOPHIL # BLD AUTO: 0.2 10E3/UL (ref 0–0.7)
EOSINOPHIL NFR BLD AUTO: 2 %
ERYTHROCYTE [DISTWIDTH] IN BLOOD BY AUTOMATED COUNT: 13.5 % (ref 10–15)
GLUCOSE SERPL-MCNC: 105 MG/DL (ref 70–99)
HCT VFR BLD AUTO: 36.5 % (ref 35–47)
HGB BLD-MCNC: 11.8 G/DL (ref 11.7–15.7)
IMM GRANULOCYTES # BLD: 0 10E3/UL
IMM GRANULOCYTES NFR BLD: 0 %
LIPASE SERPL-CCNC: 24 U/L (ref 13–60)
LYMPHOCYTES # BLD AUTO: 2.6 10E3/UL (ref 0.8–5.3)
LYMPHOCYTES NFR BLD AUTO: 38 %
MCH RBC QN AUTO: 25.9 PG (ref 26.5–33)
MCHC RBC AUTO-ENTMCNC: 32.3 G/DL (ref 31.5–36.5)
MCV RBC AUTO: 80 FL (ref 78–100)
MONOCYTES # BLD AUTO: 0.6 10E3/UL (ref 0–1.3)
MONOCYTES NFR BLD AUTO: 8 %
NEUTROPHILS # BLD AUTO: 3.6 10E3/UL (ref 1.6–8.3)
NEUTROPHILS NFR BLD AUTO: 52 %
NRBC # BLD AUTO: 0 10E3/UL
NRBC BLD AUTO-RTO: 0 /100
PLATELET # BLD AUTO: 258 10E3/UL (ref 150–450)
POTASSIUM SERPL-SCNC: 3.4 MMOL/L (ref 3.4–5.3)
PROT SERPL-MCNC: 7.6 G/DL (ref 6.4–8.3)
RBC # BLD AUTO: 4.55 10E6/UL (ref 3.8–5.2)
SODIUM SERPL-SCNC: 138 MMOL/L (ref 135–145)
WBC # BLD AUTO: 7 10E3/UL (ref 4–11)

## 2023-10-17 PROCEDURE — 96360 HYDRATION IV INFUSION INIT: CPT

## 2023-10-17 PROCEDURE — 85025 COMPLETE CBC W/AUTO DIFF WBC: CPT | Performed by: EMERGENCY MEDICINE

## 2023-10-17 PROCEDURE — 83690 ASSAY OF LIPASE: CPT | Performed by: EMERGENCY MEDICINE

## 2023-10-17 PROCEDURE — 84703 CHORIONIC GONADOTROPIN ASSAY: CPT | Performed by: EMERGENCY MEDICINE

## 2023-10-17 PROCEDURE — 76705 ECHO EXAM OF ABDOMEN: CPT

## 2023-10-17 PROCEDURE — 99284 EMERGENCY DEPT VISIT MOD MDM: CPT | Mod: 25

## 2023-10-17 PROCEDURE — 36415 COLL VENOUS BLD VENIPUNCTURE: CPT | Performed by: EMERGENCY MEDICINE

## 2023-10-17 PROCEDURE — 80053 COMPREHEN METABOLIC PANEL: CPT | Performed by: EMERGENCY MEDICINE

## 2023-10-17 PROCEDURE — 258N000003 HC RX IP 258 OP 636: Performed by: EMERGENCY MEDICINE

## 2023-10-17 RX ORDER — MORPHINE SULFATE 4 MG/ML
4 INJECTION, SOLUTION INTRAMUSCULAR; INTRAVENOUS
Status: DISCONTINUED | OUTPATIENT
Start: 2023-10-17 | End: 2023-10-18 | Stop reason: HOSPADM

## 2023-10-17 RX ORDER — ONDANSETRON 2 MG/ML
4 INJECTION INTRAMUSCULAR; INTRAVENOUS EVERY 30 MIN PRN
Status: DISCONTINUED | OUTPATIENT
Start: 2023-10-17 | End: 2023-10-18 | Stop reason: HOSPADM

## 2023-10-17 RX ADMIN — SODIUM CHLORIDE 1000 ML: 9 INJECTION, SOLUTION INTRAVENOUS at 23:31

## 2023-10-18 VITALS
HEART RATE: 80 BPM | DIASTOLIC BLOOD PRESSURE: 80 MMHG | HEIGHT: 63 IN | RESPIRATION RATE: 20 BRPM | TEMPERATURE: 98.4 F | WEIGHT: 189 LBS | SYSTOLIC BLOOD PRESSURE: 130 MMHG | BODY MASS INDEX: 33.49 KG/M2 | OXYGEN SATURATION: 98 %

## 2023-10-18 LAB
ALBUMIN UR-MCNC: NEGATIVE MG/DL
APPEARANCE UR: CLEAR
BACTERIA #/AREA URNS HPF: ABNORMAL /HPF
BILIRUB UR QL STRIP: NEGATIVE
COLOR UR AUTO: ABNORMAL
GLUCOSE UR STRIP-MCNC: NEGATIVE MG/DL
HCG SERPL QL: NEGATIVE
HGB UR QL STRIP: NEGATIVE
KETONES UR STRIP-MCNC: NEGATIVE MG/DL
LEUKOCYTE ESTERASE UR QL STRIP: ABNORMAL
MUCOUS THREADS #/AREA URNS LPF: PRESENT /LPF
NITRATE UR QL: NEGATIVE
PH UR STRIP: 6.5 [PH] (ref 5–7)
RBC URINE: 2 /HPF
SP GR UR STRIP: 1 (ref 1–1.03)
SQUAMOUS EPITHELIAL: 2 /HPF
UROBILINOGEN UR STRIP-MCNC: NORMAL MG/DL
WBC URINE: 4 /HPF

## 2023-10-18 PROCEDURE — 81001 URINALYSIS AUTO W/SCOPE: CPT | Performed by: EMERGENCY MEDICINE

## 2023-10-18 NOTE — ED TRIAGE NOTES
Pt. Presents to ED with complaints of R sided abdominal pain that worsens with movement and gets better with rest. Pt. Also reports a TMAX of 99.5 today. Able to keep food and water down today.   Pt. Reports Sunday Morning she developed N/V/D and fever. Concerned for possible food poisoning.   AVSS on RA

## 2023-10-18 NOTE — DISCHARGE INSTRUCTIONS
Push fluids  Stay away from fatty diet to avoid gallstone attacks  See general surgeon to discuss treatment for gallstones  Return if worsening pain, fever, vomiting, or any concerns

## 2023-10-18 NOTE — ED PROVIDER NOTES
"  History     Chief Complaint:  Abdominal Pain       HPI   Alexi Montemayor is a 38 year old female who presents with 1 day of right upper quadrant pain.  Her symptoms started 2 days ago with severe diarrhea and fevers and chills.  She has some abdominal cramping but not in the right upper quadrant.  She did have fever as high as 102 at home but then that all resolved by today.  She was actually feeling a lot better and went back to work.  She did not have any more diarrhea today.  She denies any urinary concerns.  She noticed right upper quadrant pain later today.  She does not know what triggered this.  She does not think is associated with eating.  She denies any nausea or vomiting today.  Interestingly, her  started having diarrhea today as well.  She denies any recent travel.  She thinks maybe they got this from eating cilantro that was not washed adequately.      Independent Historian:   None - Patient Only    Review of External Notes:   none       Medications:    citalopram (CELEXA) 20 MG tablet  methylPREDNISolone (MEDROL DOSEPAK) 4 MG tablet therapy pack  multivitamin w/minerals (THERA-VIT-M) tablet        Past Medical History:    Past Medical History:   Diagnosis Date    Anxiety     Depression     Depressive disorder        Past Surgical History:    Past Surgical History:   Procedure Laterality Date    EXTRACTION(S) DENTAL      Skin mole removal          Physical Exam   Patient Vitals for the past 24 hrs:   BP Temp Temp src Pulse Resp SpO2 Height Weight   10/17/23 2300 (!) 135/91 98.6  F (37  C) Oral 95 18 100 % 1.6 m (5' 3\") 85.7 kg (189 lb)        Physical Exam  Constitutional:       Appearance: She is well-developed.   HENT:      Right Ear: External ear normal.      Left Ear: External ear normal.      Mouth/Throat:      Mouth: Mucous membranes are moist.      Pharynx: Oropharynx is clear. No oropharyngeal exudate or posterior oropharyngeal erythema.   Eyes:      General: No scleral icterus.     " Conjunctiva/sclera: Conjunctivae normal.      Pupils: Pupils are equal, round, and reactive to light.   Cardiovascular:      Rate and Rhythm: Normal rate and regular rhythm.      Heart sounds: Normal heart sounds. No murmur heard.     No friction rub. No gallop.   Pulmonary:      Effort: Pulmonary effort is normal. No respiratory distress.      Breath sounds: Normal breath sounds. No wheezing or rales.   Abdominal:      General: Bowel sounds are normal. There is no distension.      Palpations: Abdomen is soft. There is no mass.      Tenderness: There is abdominal tenderness. There is no right CVA tenderness or left CVA tenderness.      Comments: RUQ TTP, Neg [place]   Musculoskeletal:         General: Normal range of motion.   Skin:     General: Skin is warm and dry.      Findings: No rash.   Neurological:      Mental Status: She is alert and oriented to person, place, and time.      Cranial Nerves: No cranial nerve deficit.           Emergency Department Course       Imaging:  US Abdomen Limited (RUQ)   Final Result   IMPRESSION:   1.  Cholelithiasis without sonographic features of acute cholecystitis.                   Laboratory:  Labs Ordered and Resulted from Time of ED Arrival to Time of ED Departure   COMPREHENSIVE METABOLIC PANEL - Abnormal       Result Value    Sodium 138      Potassium 3.4      Carbon Dioxide (CO2) 25      Anion Gap 11      Urea Nitrogen 9.5      Creatinine 0.71      GFR Estimate >90      Calcium 9.1      Chloride 102      Glucose 105 (*)     Alkaline Phosphatase 55      AST 15      ALT 13      Protein Total 7.6      Albumin 4.1      Bilirubin Total 0.2     ROUTINE UA WITH MICROSCOPIC REFLEX TO CULTURE - Abnormal    Color Urine Straw      Appearance Urine Clear      Glucose Urine Negative      Bilirubin Urine Negative      Ketones Urine Negative      Specific Gravity Urine 1.004      Blood Urine Negative      pH Urine 6.5      Protein Albumin Urine Negative      Urobilinogen Urine Normal       Nitrite Urine Negative      Leukocyte Esterase Urine Small (*)     Bacteria Urine Many (*)     Mucus Urine Present (*)     RBC Urine 2      WBC Urine 4      Squamous Epithelials Urine 2 (*)    CBC WITH PLATELETS AND DIFFERENTIAL - Abnormal    WBC Count 7.0      RBC Count 4.55      Hemoglobin 11.8      Hematocrit 36.5      MCV 80      MCH 25.9 (*)     MCHC 32.3      RDW 13.5      Platelet Count 258      % Neutrophils 52      % Lymphocytes 38      % Monocytes 8      Mids % (Monos, Eos, Basos)        % Eosinophils 2      % Basophils 0      % Immature Granulocytes 0      NRBCs per 100 WBC 0      Absolute Neutrophils 3.6      Absolute Lymphocytes 2.6      Absolute Monocytes 0.6      Mids Abs (Monos, Eos, Basos)        Absolute Eosinophils 0.2      Absolute Basophils 0.0      Absolute Immature Granulocytes 0.0      Absolute NRBCs 0.0     HCG QUALITATIVE PREGNANCY - Normal    hCG Serum Qualitative Negative     LIPASE - Normal    Lipase 24            Emergency Department Course & Assessments:       Interventions:  Medications   sodium chloride 0.9% BOLUS 1,000 mL (1,000 mLs Intravenous $New Bag 10/17/23 7982)   morphine (PF) injection 4 mg (has no administration in time range)   ondansetron (ZOFRAN) injection 4 mg (has no administration in time range)        Assessments:  2309 Exam    Independent Interpretation (X-rays, CTs, rhythm strip):  None    Consultations/Discussion of Management or Tests:  None        Social Determinants of Health affecting care:   None    Disposition:  The patient was discharged to home.     Impression & Plan        Medical Decision Making:  Patient presents today for evaluation of right upper quadrant pain.  Patient did have what appears to be viral illness with diarrhea and fever.  That all resolved.  Her labs are reassuring.  Ultrasound did show cholelithiasis but no signs of cholecystitis.  Her pain is controlled and minimal at this point.  I believe she can be followed as an outpatient by  general surgery.  She is referred to general surgery for follow-up and definitive treatment.  I did discuss with her avoiding fatty greasy foods for now to avoid any further recurrence.  Return precaution provided.  She is asked to come back if there is worsening pains, persistent nausea and vomiting, fever or jaundice.      Diagnosis:    ICD-10-CM    1. Symptomatic cholelithiasis  K80.20              10/17/2023   Yennifer Camargo MD Cheng, Wenlan, MD  10/18/23 0043

## 2023-10-25 ENCOUNTER — MYC MEDICAL ADVICE (OUTPATIENT)
Dept: SURGERY | Facility: CLINIC | Age: 38
End: 2023-10-25
Payer: COMMERCIAL

## 2023-10-25 ENCOUNTER — MYC MEDICAL ADVICE (OUTPATIENT)
Dept: FAMILY MEDICINE | Facility: CLINIC | Age: 38
End: 2023-10-25
Payer: COMMERCIAL

## 2023-10-25 NOTE — LETTER
2023       Alexi Montemayor    RE: [id number]  : 1985    Alexi Montemayor has been scheduled for surgery on 2023 at 7:30 am  at [facility] with Dr Parish Minor.  The hospital is located at [address] in [place].    Please check in at the Surgery reception desk at 5:30 am. This is located in the back of the hospital on the East side, just past the Emergency Room entrance.     DO NOT EAT OR DRINK ANYTHING 8 HOURS BEFORE YOUR ARRIVAL TIME.   You may have sips of clear liquids up until 2 hours before your arrival time. If you have been advised to take your medication, please do this early in the morning with just sips of clear liquid.     Hospital regulations require an updated pre-operative examination to be completed within 30 days of the procedure. This can be done by your primary care provider. Please ask them to fax documentation to [phone]. We also recommend you bring a copy with you.     You should shower before your surgery with Hibiclens or Exidine soap.  This can be found at your local pharmacy or you can pick it up from our office for free.  Please call our office if you have any questions.     You will be required to have an Adult (friend or family member) drive you home after your surgery and arrange for an adult to stay with you until the next morning.     You will receive several calls from our staff 3-7 days prior to your scheduled procedure with further details and to answer any questions you may have.    It is sometimes necessary to adjust the surgery schedule due to emergencies and additions to the schedule.  If your surgery is affected by this, we greatly appreciate your flexibility and understanding in this matter    It is best if you call regarding post-operative questions between the hours of 8:00 am & 3:00 pm Monday-Friday, so you have access to the daytime care team that know you best.  Prescription refills are accepted during regular  office hours only.            Please do not bring any Disability or FMLA papers to the hospital.  They need to be either faxed ([phone]), mailed or hand delivered to our office by you or a family member for completion.  Please allow 14 business days to complete paperwork.        If you have questions or concerns, please contact our office at [phone].

## 2023-10-25 NOTE — TELEPHONE ENCOUNTER
Spoke with MATT Moore   Per PA -  I think Alexi can continue to monitor until she sees Dr. Minor. Like you already told her, she should avoid fatty foods (milk, cream, cheese, greasy, etc) until her surgery. She could even just do clear liquids for a day or two and that may help. If her pain becomes worse where she can't manage it at home with OTC pain meds then she should go back to the ED for further evaluation.     Sent message to patient with information per Oscar.  Shahida Cali RN on 10/25/2023 at 4:36 PM

## 2023-10-29 ENCOUNTER — NURSE TRIAGE (OUTPATIENT)
Dept: NURSING | Facility: CLINIC | Age: 38
End: 2023-10-29
Payer: COMMERCIAL

## 2023-10-29 NOTE — TELEPHONE ENCOUNTER
The patient was seen in the ED one week ago due to gallstone attack, but then resided  Today she is complaining of right side tenderness and hurts when she moves  Rates pain 7/10 with movement and is a sharp pain, no pain with rest.  Triage guidelines recommend to see pcp within 24 hours  Caller verbalized and understands directives    Reason for Disposition   MODERATE pain (e.g., interferes with normal activities or awakens from sleep)    Additional Information   Negative: Passed out (i.e., lost consciousness, collapsed and was not responding)   Negative: Shock suspected (e.g., cold/pale/clammy skin, too weak to stand, low BP, rapid pulse)   Negative: Difficult to awaken or acting confused (e.g., disoriented, slurred speech)   Negative: Sounds like a life-threatening emergency to the triager   Negative: Followed a major injury to the back (e.g., MVA, fall > 10 feet or 3 meters, penetrating injury, etc.)   Negative: Back pain or flank pain during pregnancy   Negative: Upper, mid or lower back pain that occurs mainly in the midline   Negative: [1] SEVERE pain (e.g., excruciating, scale 8-10) AND [2] present > 1 hour   Negative: [1] SEVERE pain (e.g., excruciating, scale 8-10) AND [2] not improved after pain medicine   Negative: [1] Sudden onset of severe flank pain AND [2] age > 60 years   Negative: [1] Abdominal pain AND [2] age > 60 years   Negative: [1] Unable to urinate (or only a few drops) > 4 hours AND [2] bladder feels very full (e.g., palpable bladder or strong urge to urinate)   Negative: Vomiting   Negative: Weakness of a leg or foot (e.g., unable to bear weight, dragging foot)   Negative: Patient sounds very sick or weak to the triager   Negative: Fever > 100.4 F (38.0 C)   Negative: Pain or burning with passing urine (urination)    Protocols used: Flank Pain-A-AH

## 2023-10-30 ENCOUNTER — OFFICE VISIT (OUTPATIENT)
Dept: SURGERY | Facility: CLINIC | Age: 38
End: 2023-10-30
Payer: COMMERCIAL

## 2023-10-30 VITALS
HEIGHT: 63 IN | HEART RATE: 89 BPM | DIASTOLIC BLOOD PRESSURE: 68 MMHG | OXYGEN SATURATION: 98 % | BODY MASS INDEX: 33.49 KG/M2 | WEIGHT: 189 LBS | SYSTOLIC BLOOD PRESSURE: 104 MMHG

## 2023-10-30 DIAGNOSIS — K80.50 BILIARY COLIC: Primary | ICD-10-CM

## 2023-10-30 PROCEDURE — 99204 OFFICE O/P NEW MOD 45 MIN: CPT | Performed by: SURGERY

## 2023-10-30 RX ORDER — INDOCYANINE GREEN AND WATER 25 MG
2.5 KIT INJECTION ONCE
Status: CANCELLED | OUTPATIENT
Start: 2023-10-30 | End: 2023-10-30

## 2023-10-30 NOTE — TELEPHONE ENCOUNTER
Type of surgery: LAPAROSCOPIC CHOLECYSTECTOMY  Location of surgery: [facility]  Date and time of surgery: 11/30/2023 @ 7:30 am   Surgeon: DEVIKA ROBERTS MD    Pre-Op Appt Date: PATIENT TO SCHEDULE    Post-Op Appt Date: PATIENT TO SCHEDULE     Packet sent out: Yes  Pre-cert/Authorization completed:  Not Applicable  Date: 10/30/2023         LAPAROSCOPIC CHOLECYSTECTOMY GENERAL PT INST TO HAVE H&P WITH PCP 60 MIN REQ PA ASSIST RMO NMS

## 2023-10-30 NOTE — PROGRESS NOTES
Chief complaint:  Abdominal pain, right upper quadrant    HPI:  This patient is a 38 year old female who presents with right upper quadrant pain. She was in our ED on 10/17 after a day of symptoms. She had associated diarrhea as well as fever. It did not seem associated with eating. She denies jaundice. She has had similar pain in the past but to a lesser degree. She has had some persisting discomfort since her ED visit. She has not had prior abdominal surgery.    Past Medical History:   has a past medical history of Anxiety, Depression, and Depressive disorder.    Past Surgical History:  Past Surgical History:   Procedure Laterality Date    EXTRACTION(S) DENTAL      Skin mole removal          Social History:  Social History     Socioeconomic History    Marital status:      Spouse name: Miguel Monzon    Number of children: 1    Years of education: Not on file    Highest education level: High school graduate   Occupational History    Occupation: pharmacy tech   Tobacco Use    Smoking status: Never     Passive exposure: Never    Smokeless tobacco: Never   Vaping Use    Vaping Use: Never used   Substance and Sexual Activity    Alcohol use: Not Currently     Comment: Rarely    Drug use: No    Sexual activity: Yes     Partners: Male     Birth control/protection: Condom   Other Topics Concern    Parent/sibling w/ CABG, MI or angioplasty before 65F 55M? No   Social History Narrative    Not on file     Social Determinants of Health     Financial Resource Strain: Low Risk  (8/14/2023)    Overall Financial Resource Strain (CARDIA)     Difficulty of Paying Living Expenses: Not very hard   Food Insecurity: No Food Insecurity (8/14/2023)    Hunger Vital Sign     Worried About Running Out of Food in the Last Year: Never true     Ran Out of Food in the Last Year: Never true   Transportation Needs: No Transportation Needs (8/14/2023)    PRAPARE - Transportation     Lack of Transportation (Medical): No     Lack of Transportation  (Non-Medical): No   Physical Activity: Insufficiently Active (8/14/2023)    Exercise Vital Sign     Days of Exercise per Week: 3 days     Minutes of Exercise per Session: 30 min   Stress: No Stress Concern Present (8/14/2023)    [employer] [place] [employer] - Occupational Stress Questionnaire     Feeling of Stress : Only a little   Social Connections: Moderately Integrated (8/14/2023)    Social Connection and Isolation Panel [NHANES]     Frequency of Communication with Friends and Family: Twice a week     Frequency of Social Gatherings with Friends and Family: More than three times a week     Attends [employer] Services: 1 to 4 times per year     Active Member of Clubs or Organizations: No     Attends Club or Organization Meetings: Not on file     Marital Status:    Interpersonal Safety: Not on file   Housing Stability: Low Risk  (8/14/2023)    Housing Stability Vital Sign     Unable to Pay for Housing in the Last Year: No     Number of Places Lived in the Last Year: 1     Unstable Housing in the Last Year: No        Family History:  Family History   Problem Relation Age of Onset    Cervical Cancer Mother     Multiple Sclerosis Mother     Vaginal Cancer Mother     Anxiety Disorder Mother     Diabetes Father     Hypertension Father     Alcoholism Father     Substance Abuse Father         alcoholic    Obesity Father     Depression Sister     Anxiety Disorder Sister     Obesity Sister     Other - See Comments Sister         MOG- like MS    Diabetes Type 2  Maternal Grandmother     Hypertension Maternal Grandmother     Schizophrenia Maternal Aunt     Rheumatoid Arthritis Maternal Uncle     Mental Illness Cousin     Glaucoma No family hx of     Macular Degeneration No family hx of     Colon Cancer No family hx of     Breast Cancer No family hx of        Review of Systems:  The 10 point Review of Systems is negative other than noted in the HPI and above.    Physical Exam:  General - This is a well  developed, well nourished female in no apparent distress.  HEENT - Normocephalic, atraumatic.  No scleral icterus, membranes moist.  Respiratory- non-labored  Gastrointestinal:   soft, non-distended with tenderness noted in the right upper quadrant  and Castillo's sign is absent. no masses palpated. normal bowel sounds.  Extremities - warm without edema  Neurologic - non-focal  Psych-normal affect      Relevant labs:  LFTs and lipase normal.    Imaging:  Films personally reviewed by me today.  Gallstones, no wall thickening or duct dilation.    Assessment and Plan:  I reviewed the pathophysiology of biliary tract disease, its natural history and indications for cholecystectomy.  I have recommended Laparoscopic cholecystectomy, she is interested in pursuing this.  Risks including infection, bleeding, harm to structures, open conversion, bile leak, retained stone and common duct injury were discussed.  We will work on scheduling surgery at the patient s convenience.    Parish Minor MD  [facility]    Please route or send letter to:  Primary Care Provider (PCP)

## 2023-10-30 NOTE — LETTER
October 30, 2023        Yennifer Camargo MD  [facility]  [address] [place] [address]      RE:   Alexi Montemayor 1985      Dear Colleague,    Thank you for referring your patient, Alexi Montemayor, to [facility] at [place] location. Please see a copy of my visit note below.    Chief complaint:  Abdominal pain, right upper quadrant    HPI:  This patient is a 38 year old female who presents with right upper quadrant pain. She was in our ED on 10/17 after a day of symptoms. She had associated diarrhea as well as fever. It did not seem associated with eating. She denies jaundice. She has had similar pain in the past but to a lesser degree. She has had some persisting discomfort since her ED visit. She has not had prior abdominal surgery.    Past Medical History:   has a past medical history of Anxiety, Depression, and Depressive disorder.    Review of Systems:  The 10 point Review of Systems is negative other than noted in the HPI and above.    Physical Exam:  General - This is a well developed, well nourished female in no apparent distress.  HEENT - Normocephalic, atraumatic.  No scleral icterus, membranes moist.  Respiratory- non-labored  Gastrointestinal: soft, non-distended with tenderness noted in the right upper quadrant  and Castillo's sign is absent. no masses palpated. normal bowel sounds.  Extremities - warm without edema  Neurologic - non-focal  Psych-normal affect    Relevant labs:  LFTs and lipase normal.    Imaging:  Films personally reviewed by me today.  Gallstones, no wall thickening or duct dilation.    Assessment and Plan:  I reviewed the pathophysiology of biliary tract disease, its natural history and indications for cholecystectomy.  I have recommended Laparoscopic cholecystectomy, she is interested in pursuing this.  Risks including infection, bleeding, harm to structures, open conversion, bile leak, retained stone and common duct injury were discussed.  We will  work on scheduling surgery at the patient s convenience.       Again, thank you for allowing me to participate in the care of your patient.      Sincerely,      MD SCOTTIE Olson/ed       D: 10/30/2023  T: 2:16 pm

## 2023-10-31 ENCOUNTER — TRANSFERRED RECORDS (OUTPATIENT)
Dept: HEALTH INFORMATION MANAGEMENT | Facility: CLINIC | Age: 38
End: 2023-10-31
Payer: COMMERCIAL

## 2023-11-01 ENCOUNTER — E-VISIT (OUTPATIENT)
Dept: FAMILY MEDICINE | Facility: CLINIC | Age: 38
End: 2023-11-01
Payer: COMMERCIAL

## 2023-11-01 DIAGNOSIS — R10.11 RUQ ABDOMINAL PAIN: Primary | ICD-10-CM

## 2023-11-01 PROCEDURE — 99421 OL DIG E/M SVC 5-10 MIN: CPT | Performed by: FAMILY MEDICINE

## 2023-11-02 NOTE — PATIENT INSTRUCTIONS
Thank you for choosing us for your care. Given your symptoms, I would like you to do a lab-only visit to determine what is causing them.  I have placed the orders.  Please schedule an appointment with the lab right here in [facility], or call [phone].  I will let you know when the results are back and next steps to take.

## 2023-11-02 NOTE — TELEPHONE ENCOUNTER
Provider E-Visit time total (minutes): 9 minutes    Will order h. Pylori testing.  Suspect she is having symptomatic biliary colic, not PUD or H. Pylori.

## 2023-11-04 PROCEDURE — 87338 HPYLORI STOOL AG IA: CPT | Performed by: FAMILY MEDICINE

## 2023-11-06 LAB — H PYLORI AG STL QL IA: NEGATIVE

## 2023-11-20 SDOH — HEALTH STABILITY: PHYSICAL HEALTH: ON AVERAGE, HOW MANY MINUTES DO YOU ENGAGE IN EXERCISE AT THIS LEVEL?: 10 MIN

## 2023-11-20 SDOH — HEALTH STABILITY: PHYSICAL HEALTH: ON AVERAGE, HOW MANY DAYS PER WEEK DO YOU ENGAGE IN MODERATE TO STRENUOUS EXERCISE (LIKE A BRISK WALK)?: 2 DAYS

## 2023-11-20 ASSESSMENT — LIFESTYLE VARIABLES
SKIP TO QUESTIONS 9-10: 1
HOW MANY STANDARD DRINKS CONTAINING ALCOHOL DO YOU HAVE ON A TYPICAL DAY: 1 OR 2
HOW OFTEN DO YOU HAVE A DRINK CONTAINING ALCOHOL: MONTHLY OR LESS
HOW OFTEN DO YOU HAVE SIX OR MORE DRINKS ON ONE OCCASION: NEVER
AUDIT-C TOTAL SCORE: 1

## 2023-11-20 ASSESSMENT — SOCIAL DETERMINANTS OF HEALTH (SDOH)
IN A TYPICAL WEEK, HOW MANY TIMES DO YOU TALK ON THE PHONE WITH FAMILY, FRIENDS, OR NEIGHBORS?: THREE TIMES A WEEK
HOW OFTEN DO YOU ATTENT MEETINGS OF THE CLUB OR ORGANIZATION YOU BELONG TO?: PATIENT DECLINED
HOW OFTEN DO YOU ATTEND CHURCH OR RELIGIOUS SERVICES?: 1 TO 4 TIMES PER YEAR
HOW OFTEN DO YOU GET TOGETHER WITH FRIENDS OR RELATIVES?: ONCE A WEEK
DO YOU BELONG TO ANY CLUBS OR ORGANIZATIONS SUCH AS CHURCH GROUPS UNIONS, FRATERNAL OR ATHLETIC GROUPS, OR SCHOOL GROUPS?: NO

## 2023-11-20 NOTE — COMMUNITY RESOURCES LIST (ENGLISH)
11/20/2023   [facility]  N/A  For questions about this resource list or additional care needs, please contact your primary care clinic or care manager.  Phone: [phone]   Email: N/A   Address: [address] [place] [address]   Hours: N/A        Mental Health       Individual counseling  1  [facility] Distance: 1.26 miles      In-Person, Phone/Virtual   [address] [place] [address]  Language: English, Slovak  Hours: Mon - Thu 7:00 AM - 9:00 PM Appt. Only, Fri 7:00 AM - 5:00 PM Appt. Only  Fees: Insurance, Self Pay, Sliding Fee   Phone: (759) 770-2543 Email: [email or web address]@[email or web address] Website: https://www.[employer]/our-locations/[place]/[facility]/     2  [facility] Distance: 1.27 miles      In-Person, Phone/Virtual   [address] [facility] [address] [place] [address]  Language: English  Hours: Mon - Thu 7:30 AM - 5:00 PM , Fri 7:30 AM - 4:30 PM  Fees: Insurance, Self Pay   Phone: (798) 212-5010 Email: office@[employer] Website: http://www.[employer].org/     Mental health support group  3  [facility] Distance: 6.13 miles      In-Person, Phone/Virtual   [address] [address] [place] [address]  Language: English  Hours: Mon - Fri 9:00 AM - 5:00 PM Appt. Only  Fees: Free   Phone: (412) 871-5317 Email: [email or web address] Website: http://www.[email or web address].[email or web address]/     4  [facility], [facility] Distance: 10.94 miles      Phone/Virtual   [address] [place] [address]  Language: English  Hours: Tue - Fri 8:00 AM - 7:00 PM , Sat 9:00 AM - 4:00 PM  Fees: Insurance, Self Pay   Phone: (156) 482-9276 Email: juno@[email or web address] Website: http://www.[employer]/          Important Numbers & Websites       Emergency Services   [address]  [place] [address]  Poison Control   (521)  660-7570  Suicide Prevention Lifeline   (925) 519-5921 (TALK)  Child Abuse Hotline   (943) 488-2042 (4-A-Child)  Sexual Assault Hotline   (947) 806-7317 (HOPE)  National Runaway Safeline   (589) 901-8135 (RUNAWAY)  All-Options Talkline   (329) 121-4803  Substance Abuse Referral   (828) 327-6377 (HELP)

## 2023-11-20 NOTE — COMMUNITY RESOURCES LIST (ENGLISH)
11/20/2023   [facility]  N/A  For questions about this resource list or additional care needs, please contact your primary care clinic or care manager.  Phone: [phone]   Email: N/A   Address: [address] [place] [address]   Hours: N/A        Mental Health       Individual counseling  1  [facility] Distance: 1.26 miles      In-Person, Phone/Virtual   [address] [place] [address]  Language: English, Japanese  Hours: Mon - Thu 7:00 AM - 9:00 PM Appt. Only, Fri 7:00 AM - 5:00 PM Appt. Only  Fees: Insurance, Self Pay, Sliding Fee   Phone: (518) 838-5580 Email: [email or web address]@[email or web address] Website: https://www.[employer]/our-locations/[place]/[facility]/     2  [facility] Distance: 1.27 miles      In-Person, Phone/Virtual   [address] [facility] [address] [place] [address]  Language: English  Hours: Mon - Thu 7:30 AM - 5:00 PM , Fri 7:30 AM - 4:30 PM  Fees: Insurance, Self Pay   Phone: (873) 979-9363 Email: office@[employer] Website: http://www.[employer].org/     Mental health support group  3  [facility] Distance: 6.13 miles      In-Person, Phone/Virtual   [address] [address] [place] [address]  Language: English  Hours: Mon - Fri 9:00 AM - 5:00 PM Appt. Only  Fees: Free   Phone: (635) 724-7914 Email: [email or web address].org Website: http://www.[employer].org/     4  [facility], [facility] Distance: 10.94 miles      Phone/Virtual   [address] [place] [address]  Language: English  Hours: Tue - Fri 8:00 AM - 7:00 PM , Sat 9:00 AM - 4:00 PM  Fees: Insurance, Self Pay   Phone: (396) 377-8559 Email: juno@[employer] Website: http://www.[employer] [facility]/          Important Numbers & Websites       Emergency Services   [address]  [place] [address]  Poison Control   (965)  466-7236  Suicide Prevention Lifeline   (266) 127-5698 (TALK)  Child Abuse Hotline   (563) 553-6221 (4-A-Child)  Sexual Assault Hotline   (191) 980-9815 (HOPE)  National Runaway Safeline   (884) 314-8266 (RUNAWAY)  All-Options Talkline   (367) 437-7344  Substance Abuse Referral   (464) 135-7198 (HELP)

## 2023-11-22 ENCOUNTER — NURSE TRIAGE (OUTPATIENT)
Dept: NURSING | Facility: CLINIC | Age: 38
End: 2023-11-22
Payer: COMMERCIAL

## 2023-11-23 ENCOUNTER — APPOINTMENT (OUTPATIENT)
Dept: ULTRASOUND IMAGING | Facility: CLINIC | Age: 38
End: 2023-11-23
Attending: EMERGENCY MEDICINE
Payer: COMMERCIAL

## 2023-11-23 ENCOUNTER — HOSPITAL ENCOUNTER (EMERGENCY)
Facility: CLINIC | Age: 38
Discharge: HOME | End: 2023-11-23
Attending: EMERGENCY MEDICINE | Admitting: EMERGENCY MEDICINE
Payer: COMMERCIAL

## 2023-11-23 VITALS
RESPIRATION RATE: 18 BRPM | TEMPERATURE: 96.3 F | DIASTOLIC BLOOD PRESSURE: 96 MMHG | SYSTOLIC BLOOD PRESSURE: 149 MMHG | OXYGEN SATURATION: 99 % | HEART RATE: 91 BPM

## 2023-11-23 DIAGNOSIS — K80.20 SYMPTOMATIC CHOLELITHIASIS: ICD-10-CM

## 2023-11-23 LAB
ALBUMIN SERPL BCG-MCNC: 4.7 G/DL (ref 3.5–5.2)
ALP SERPL-CCNC: 64 U/L (ref 40–150)
ALT SERPL W P-5'-P-CCNC: 10 U/L (ref 0–50)
ANION GAP SERPL CALCULATED.3IONS-SCNC: 11 MMOL/L (ref 7–15)
AST SERPL W P-5'-P-CCNC: 12 U/L (ref 0–45)
BASOPHILS # BLD AUTO: 0 10E3/UL (ref 0–0.2)
BASOPHILS NFR BLD AUTO: 0 %
BILIRUB SERPL-MCNC: 0.3 MG/DL
BUN SERPL-MCNC: 12.7 MG/DL (ref 6–20)
CALCIUM SERPL-MCNC: 8.7 MG/DL (ref 8.6–10)
CHLORIDE SERPL-SCNC: 99 MMOL/L (ref 98–107)
CREAT SERPL-MCNC: 0.81 MG/DL (ref 0.51–0.95)
DEPRECATED HCO3 PLAS-SCNC: 27 MMOL/L (ref 22–29)
EGFRCR SERPLBLD CKD-EPI 2021: >90 ML/MIN/1.73M2
EOSINOPHIL # BLD AUTO: 0.1 10E3/UL (ref 0–0.7)
EOSINOPHIL NFR BLD AUTO: 2 %
ERYTHROCYTE [DISTWIDTH] IN BLOOD BY AUTOMATED COUNT: 13.6 % (ref 10–15)
GLUCOSE SERPL-MCNC: 108 MG/DL (ref 70–99)
HCG SERPL QL: NEGATIVE
HCT VFR BLD AUTO: 39.6 % (ref 35–47)
HGB BLD-MCNC: 12.6 G/DL (ref 11.7–15.7)
HOLD SPECIMEN: NORMAL
IMM GRANULOCYTES # BLD: 0 10E3/UL
IMM GRANULOCYTES NFR BLD: 0 %
LIPASE SERPL-CCNC: 19 U/L (ref 13–60)
LYMPHOCYTES # BLD AUTO: 2.7 10E3/UL (ref 0.8–5.3)
LYMPHOCYTES NFR BLD AUTO: 37 %
MCH RBC QN AUTO: 25.9 PG (ref 26.5–33)
MCHC RBC AUTO-ENTMCNC: 31.8 G/DL (ref 31.5–36.5)
MCV RBC AUTO: 81 FL (ref 78–100)
MONOCYTES # BLD AUTO: 0.5 10E3/UL (ref 0–1.3)
MONOCYTES NFR BLD AUTO: 7 %
NEUTROPHILS # BLD AUTO: 4 10E3/UL (ref 1.6–8.3)
NEUTROPHILS NFR BLD AUTO: 54 %
NRBC # BLD AUTO: 0 10E3/UL
NRBC BLD AUTO-RTO: 0 /100
PLATELET # BLD AUTO: 289 10E3/UL (ref 150–450)
POTASSIUM SERPL-SCNC: 3.7 MMOL/L (ref 3.4–5.3)
PROT SERPL-MCNC: 8 G/DL (ref 6.4–8.3)
RBC # BLD AUTO: 4.87 10E6/UL (ref 3.8–5.2)
SODIUM SERPL-SCNC: 137 MMOL/L (ref 135–145)
WBC # BLD AUTO: 7.4 10E3/UL (ref 4–11)

## 2023-11-23 PROCEDURE — 80053 COMPREHEN METABOLIC PANEL: CPT | Performed by: EMERGENCY MEDICINE

## 2023-11-23 PROCEDURE — 76705 ECHO EXAM OF ABDOMEN: CPT

## 2023-11-23 PROCEDURE — 99284 EMERGENCY DEPT VISIT MOD MDM: CPT | Mod: 25

## 2023-11-23 PROCEDURE — 84703 CHORIONIC GONADOTROPIN ASSAY: CPT | Performed by: EMERGENCY MEDICINE

## 2023-11-23 PROCEDURE — 83690 ASSAY OF LIPASE: CPT | Performed by: EMERGENCY MEDICINE

## 2023-11-23 PROCEDURE — 85025 COMPLETE CBC W/AUTO DIFF WBC: CPT | Performed by: EMERGENCY MEDICINE

## 2023-11-23 PROCEDURE — 36415 COLL VENOUS BLD VENIPUNCTURE: CPT | Performed by: EMERGENCY MEDICINE

## 2023-11-23 ASSESSMENT — ACTIVITIES OF DAILY LIVING (ADL): ADLS_ACUITY_SCORE: 35

## 2023-11-23 NOTE — TELEPHONE ENCOUNTER
"Triage Call:    Caller: Patient  She has gallstones and surgery is scheduled for next week.  Yesterday and all day today, more pain is near umbilicus.  It comes and goes.  She can feeling in abdomen and back.    It comes on quick and then goes away quickly.    Nothing improves the pain or makes it worse.    There have been mucous in stools.    Rating pain 5/10 when present, sometimes lower.     Protocol Recommended Disposition: Home care    Caller verbalized understanding of instructions and questions answered.      Trinidad Chaney RN on 11/22/2023 at 8:43 PM    Reason for Disposition   [1] MILD-MODERATE pain AND [2] comes and goes (cramps)    Additional Information   Negative: Shock suspected (e.g., cold/pale/clammy skin, too weak to stand, low BP, rapid pulse)   Negative: Difficult to awaken or acting confused (e.g., disoriented, slurred speech)   Negative: Passed out (i.e., lost consciousness, collapsed and was not responding)   Negative: Sounds like a life-threatening emergency to the triager   Negative: [1] SEVERE pain (e.g., excruciating) AND [2] present > 1 hour   Negative: [1] SEVERE pain AND [2] age > 60 years   Negative: [1] Vomiting AND [2] contains red blood or black (\"coffee ground\") material  (Exception: Few red streaks in vomit that only happened once.)   Negative: Blood in bowel movements  (Exception: Blood on surface of BM with constipation.)   Negative: Black or tarry bowel movements  (Exception: Chronic-unchanged black-grey BMs AND is taking iron pills or Pepto-Bismol.)   Negative: [1] Vomiting AND [2] contains bile (green color)   Negative: Patient sounds very sick or weak to the triager   Negative: [1] MILD-MODERATE pain AND [2] constant AND [3] present > 2 hours   Negative: [1] Vomiting AND [2] abdomen looks much more swollen than usual   Negative: White of the eyes have turned yellow (i.e., jaundice)   Negative: Fever > 103 F (39.4 C)   Negative: [1] Fever > 101 F (38.3 C) AND [2] age > 60 " years   Negative: [1] Fever > 100.0 F (37.8 C) AND [2] bedridden (e.g., CVA, chronic illness, recovering from surgery)   Negative: [1] Fever > 100.0 F (37.8 C) AND [2] diabetes mellitus or weak immune system (e.g., HIV positive, cancer chemo, splenectomy, organ transplant, chronic steroids)   Negative: [1] SEVERE pain AND [2] present < 1 hour   Negative: [1] MODERATE pain (e.g., interferes with normal activities) AND [2] pain comes and goes (cramps) AND [3] present > 24 hours  (Exception: Pain with Vomiting or Diarrhea - see that Guideline.)   Negative: [1] MILD pain (e.g., does not interfere with normal activities) AND [2] pain comes and goes (cramps) AND [3] present > 48 hours  (Exception: This same abdominal pain is a chronic symptom recurrent or ongoing AND present > 4 weeks.)   Negative: Age > 60 years   Negative: Pregnancy suspected (e.g., missed last menstrual period)   Negative: Unusual vaginal discharge (e.g., bad smelling, yellow, green, or foamy-white)   Negative: Blood in urine (red, pink, or tea-colored)   Negative: Abdominal pain is a chronic symptom (recurrent or ongoing AND present > 4 weeks)   Negative: Pain with sexual intercourse (dyspareunia)   Negative: [1] MILD-MODERATE pain AND [2] constant and [3] present < 2 hours    Protocols used: Abdominal Pain - Female-A-

## 2023-11-23 NOTE — ED TRIAGE NOTES
Here for concern epigastric abdominal pain started yesterday morning, started as intermittent cramping and now pain is more persistent. Pain radiates to the back. Stated has known gallstones and have surgery schedule for next Thursday. ABCs intact.      Triage Assessment (Adult)       Row Name 11/23/23 0240          Triage Assessment    Airway WDL WDL        Respiratory WDL    Respiratory WDL WDL        Cardiac WDL    Cardiac WDL WDL

## 2023-11-27 ENCOUNTER — OFFICE VISIT (OUTPATIENT)
Dept: FAMILY MEDICINE | Facility: CLINIC | Age: 38
End: 2023-11-27
Payer: COMMERCIAL

## 2023-11-27 VITALS
BODY MASS INDEX: 32.83 KG/M2 | WEIGHT: 185.3 LBS | OXYGEN SATURATION: 100 % | RESPIRATION RATE: 18 BRPM | HEIGHT: 63 IN | HEART RATE: 66 BPM | TEMPERATURE: 98 F | SYSTOLIC BLOOD PRESSURE: 109 MMHG | DIASTOLIC BLOOD PRESSURE: 74 MMHG

## 2023-11-27 DIAGNOSIS — F41.9 ANXIETY: ICD-10-CM

## 2023-11-27 DIAGNOSIS — K80.20 GALLSTONES: ICD-10-CM

## 2023-11-27 DIAGNOSIS — Z01.818 PREOP GENERAL PHYSICAL EXAM: Primary | ICD-10-CM

## 2023-11-27 PROCEDURE — 90715 TDAP VACCINE 7 YRS/> IM: CPT | Performed by: FAMILY MEDICINE

## 2023-11-27 PROCEDURE — 99214 OFFICE O/P EST MOD 30 MIN: CPT | Mod: 25 | Performed by: FAMILY MEDICINE

## 2023-11-27 PROCEDURE — 90471 IMMUNIZATION ADMIN: CPT | Performed by: FAMILY MEDICINE

## 2023-11-27 NOTE — PROGRESS NOTES
[address] [address]  Phone: [phone]  Primary Provider: Rosita Campos  Pre-op Performing Provider: JASON COOK      PREOPERATIVE EVALUATION:  Today's date: 11/27/2023    Alexi is a 38 year old, presenting for the following:  Pre-Op Exam (Surgery 11/30/23)        11/27/2023     2:17 PM   Additional Questions   Roomed by Stephanie   Accompanied by Self       Surgical Information:  Surgery/Procedure: Cholecystectomy and laparoscopic  Surgery Location: [facility]  Surgeon: Dr Parish Dawikns  Surgery Date: 11/30/23  Time of Surgery: 7:30 am  Where patient plans to recover: At home with family  Fax number for surgical facility: Note does not need to be faxed, will be available electronically in Epic.    Assessment & Plan     The proposed surgical procedure is considered LOW risk.    Preop general physical exam  Fit for surgery     Gallstones  Reason for surgery    Anxiety and depression under good control - in remission         Possible Sleep Apnea: ZERO       11/27/2023     4:41 PM   STOP-Bang Total Score   Total Score 0   Risk Stratification 0 - 2: Low Risk for RITIKA           - No identified additional risk factors other than previously addressed    Antiplatelet or Anticoagulation Medication Instructions:   - Patient is on no antiplatelet or anticoagulation medications.    Additional Medication Instructions:  Patient is to take all scheduled medications on the day of surgery    RECOMMENDATION:  APPROVAL GIVEN to proceed with proposed procedure, without further diagnostic evaluation.      20 minutes spent by me on the date of the encounter doing chart review, history and exam, documentation and further activities per the note      Subjective       HPI related to upcoming procedure: Alexi Montemayor is a 38 year old woman here for preop before gallbladder surgery .   She has no concerns.   She does still get intermittent  symptoms.   She is trying to follow a low fat diet until surgery.           11/20/2023     4:14 PM   Preop Questions   1. Have you ever had a heart attack or stroke? No   2. Have you ever had surgery on your heart or blood vessels, such as a stent placement, a coronary artery bypass, or surgery on an artery in your head, neck, heart, or legs? No   3. Do you have chest pain with activity? No   4. Do you have a history of  heart failure? No   5. Do you currently have a cold, bronchitis or symptoms of other infection? No   6. Do you have a cough, shortness of breath, or wheezing? No   7. Do you or anyone in your family have previous history of blood clots? No   8. Do you or does anyone in your family have a serious bleeding problem such as prolonged bleeding following surgeries or cuts? No   9. Have you ever had problems with anemia or been told to take iron pills? YES - in the past    10. Have you had any abnormal blood loss such as black, tarry or bloody stools, or abnormal vaginal bleeding? No   11. Have you ever had a blood transfusion? No   12. Are you willing to have a blood transfusion if it is medically needed before, during, or after your surgery? Yes   13. Have you or any of your relatives ever had problems with anesthesia? No   14. Do you have sleep apnea, excessive snoring or daytime drowsiness? UNKNOWN -    15. Do you have any artifical heart valves or other implanted medical devices like a pacemaker, defibrillator, or continuous glucose monitor? No   16. Do you have artificial joints? No   17. Are you allergic to latex? No   18. Is there any chance that you may be pregnant? No       Health Care Directive:  Patient does not have a Health Care Directive or Living Will: Discussed advance care planning with patient; information given to patient to review.    Preoperative Review of :   reviewed - no record of controlled substances prescribed.      Patient Active Problem List   Diagnosis    Anxiety     Major depressive disorder, recurrent episode, moderate with anxious distress (H)    Panic disorder without agoraphobia    Gallstones         Review of Systems  Constitutional, neuro, ENT, endocrine, pulmonary, cardiac, gastrointestinal, genitourinary, musculoskeletal, integument and psychiatric systems are negative, except as otherwise noted.    Patient Active Problem List    Diagnosis Date Noted    Major depressive disorder, recurrent episode, moderate with anxious distress (H) 02/11/2019     Priority: Medium    Panic disorder without agoraphobia 02/11/2019     Priority: Medium    Anxiety 06/21/2017     Priority: Medium      Past Medical History:   Diagnosis Date    Anxiety     Depression     Depressive disorder      Past Surgical History:   Procedure Laterality Date    EXTRACTION(S) DENTAL      Skin mole removal       Current Outpatient Medications   Medication Sig Dispense Refill    citalopram (CELEXA) 20 MG tablet Take 2 tablets (40 mg) by mouth daily  1    multivitamin w/minerals (THERA-VIT-M) tablet Take 1 tablet by mouth daily         Allergies   Allergen Reactions    Pcn [Penicillins] Hives    Sulfa Antibiotics Hives        Social History     Tobacco Use    Smoking status: Never     Passive exposure: Never    Smokeless tobacco: Never   Substance Use Topics    Alcohol use: Not Currently     Comment: Rarely     Family History   Problem Relation Age of Onset    Cervical Cancer Mother     Multiple Sclerosis Mother     Vaginal Cancer Mother     Anxiety Disorder Mother     Diabetes Father     Hypertension Father     Alcoholism Father     Substance Abuse Father         alcoholic    Obesity Father     Depression Sister     Anxiety Disorder Sister     Obesity Sister     Other - See Comments Sister         MOG- like MS    Diabetes Type 2  Maternal Grandmother     Hypertension Maternal Grandmother     Schizophrenia Maternal Aunt     Rheumatoid Arthritis Maternal Uncle     Mental Illness Cousin     Glaucoma No family hx  "of     Macular Degeneration No family hx of     Colon Cancer No family hx of     Breast Cancer No family hx of      History   Drug Use No         Objective     /74 (BP Location: Right arm, Patient Position: Sitting, Cuff Size: Adult Regular)   Pulse 66   Temp 98  F (36.7  C) (Oral)   Resp 18   Ht 1.6 m (5' 3\")   Wt 84.1 kg (185 lb 4.8 oz)   LMP 11/17/2023 (Approximate)   SpO2 100%   BMI 32.82 kg/m      Physical Exam    GENERAL APPEARANCE: healthy, alert and no distress     EYES: EOMI, PERRL     HENT: ear canals and TM's normal and nose and mouth without ulcers or lesions     NECK: no adenopathy, no asymmetry, masses, or scars and thyroid normal to palpation     RESP: lungs clear to auscultation - no rales, rhonchi or wheezes     CV: regular rates and rhythm, normal S1 S2, no S3 or S4 and no murmur, click or rub     ABDOMEN:  soft, nontender, no HSM or masses and bowel sounds normal     MS: extremities normal- no gross deformities noted, no evidence of inflammation in joints, FROM in all extremities.     SKIN: no suspicious lesions or rashes     NEURO: Normal strength and tone, sensory exam grossly normal, mentation intact and speech normal     PSYCH: mentation appears normal. and affect normal/bright     LYMPHATICS: No cervical adenopathy    Recent Labs   Lab Test 11/23/23  0248 10/17/23  2331 08/18/23  1536   HGB 12.6 11.8 12.1    258 278    138 141   POTASSIUM 3.7 3.4 4.0   CR 0.81 0.71 0.77   A1C  --   --  5.4        Diagnostics:  No labs were ordered during this visit.   No EKG required for low risk surgery (cataract, skin procedure, breast biopsy, etc).    Revised Cardiac Risk Index (RCRI):  The patient has the following serious cardiovascular risks for perioperative complications:   - No serious cardiac risks = 0 points     RCRI Interpretation: 0 points: Class I (very low risk - 0.4% complication rate)         Signed Electronically by: Ivette Ennis MD  Copy of this evaluation " report is provided to requesting physician.

## 2023-11-27 NOTE — PATIENT INSTRUCTIONS
Patient : Jose G Aquino Age: 88 year old Sex: male   MRN: [id number] Encounter Date: 2/26/2018      History     Chief Complaint   Patient presents with   • Weakness     Patient is an 88-year-old male sent in from a nursing facility due to a generalized decline in condition. History from patient and awaiting family for additional information. He states he hasn't had an appetite and hasn't been eating or drinking much. Said some mild diarrhea as well as some discomfort in his lower mid abdomen. Cough. No chest pain. No vomiting. He has a sore on his buttock and states he's had a stroke and has difficulty moving his right side as well as difficulty swallowing. He is nonambulatory.      The history is provided by the patient, the EMS personnel and medical records.   Weakness    Associated symptoms include abdominal pain, diarrhea and cough. Pertinent negatives include no fever, no nausea, no vomiting, no sore throat, no neck pain, no wheezing, no rash, no eye discharge and no eye redness.       Allergies   Allergen Reactions   • Ativan ANXIETY       Prior to Admission Medications    ACETAMINOPHEN (TYLENOL) 325 MG TABLET    Take 650 mg by mouth every 4 hours as needed.     ALBUTEROL (VENTOLIN) (2.5 MG/3ML) 0.083% NEBULIZER SOLUTION    Take 3 mLs by nebulization every 6 hours as needed for Wheezing. [id number]    ALBUTEROL 108 (90 BASE) MCG/ACT INHALER    Inhale 2 puffs into the lungs every 4 hours as needed for Shortness of Breath or Wheezing.    APIXABAN (ELIQUIS) 2.5 MG TAB    Take 1 tablet by mouth 2 times daily.    ASCORBIC ACID (VITAMIN C) 250 MG TABLET    Take 250 mg by mouth 2 times daily.     ATORVASTATIN (LIPITOR) 20 MG TABLET    Take 1 tablet by mouth daily.    DOCUSATE SODIUM (COLACE) 100 MG CAPSULE    Take 1 capsule by mouth 2 times daily.    ESCITALOPRAM (LEXAPRO) 10 MG TABLET    Take 10 mg by mouth nightly.    FERROUS SULFATE 325 (65 FE) MG TABLET    Take 1 tablet by mouth daily (with breakfast).    FLUTICASONE  Preparing for Your Surgery  Getting started  A nurse will call you to review your health history and instructions. They will give you an arrival time based on your scheduled surgery time. Please be ready to share:  Your doctor's clinic name and phone number  Your medical, surgical, and anesthesia history  A list of allergies and sensitivities  A list of medicines, including herbal treatments and over-the-counter drugs  Whether the patient has a legal guardian (ask how to send us the papers in advance)  Please tell us if you're pregnant--or if there's any chance you might be pregnant. Some surgeries may injure a fetus (unborn baby), so they require a pregnancy test. Surgeries that are safe for a fetus don't always need a test, and you can choose whether to have one.   If you have a child who's having surgery, please ask for a copy of Preparing for Your Child's Surgery.    Preparing for surgery  Within 10 to 30 days of surgery: Have a pre-op exam (sometimes called an H&P, or History and Physical). This can be done at a clinic or pre-operative center.  If you're having a , you may not need this exam. Talk to your care team.  At your pre-op exam, talk to your care team about all medicines you take. If you need to stop any medicines before surgery, ask when to start taking them again.  We do this for your safety. Many medicines can make you bleed too much during surgery. Some change how well surgery (anesthesia) drugs work.  Call your insurance company to let them know you're having surgery. (If you don't have insurance, call [phone].)  Call your clinic if there's any change in your health. This includes signs of a cold or flu (sore throat, runny nose, cough, rash, fever). It also includes a scrape or scratch near the surgery site.  If you have questions on the day of surgery, call your hospital or surgery center.  Eating and drinking guidelines  For your safety: Unless your surgeon tells you otherwise,  (FLOVENT HFA) 220 MCG/ACT INHALER    Inhale 1 puff into the lungs 2 times daily.    FUROSEMIDE (LASIX) 40 MG TABLET    Take 1 tablet by mouth 2 times daily.    INCONTINENCE SUPPLIES (WATERPROOF SHEETING 36\"X54\") MISC    1 each 3 times daily.    INCONTINENCE SUPPLY DISPOSABLE (DEPEND ADJUSTABLE UNDERWEAR LG) MISC    Depends incontinence underwear    LATANOPROST (XALATAN) 0.005 % OPHTHALMIC SOLUTION    Place 1 drop into both eyes daily.     LEVOTHYROXINE (SYNTHROID, LEVOTHROID) 100 MCG TABLET    Take 1 tablet by mouth daily (before breakfast).    LOPERAMIDE (IMODIUM A-D) 2 MG CAPSULE    Take 1 capsule by mouth 4 times daily as needed for Diarrhea.    MULTIPLE VITAMINS-MINERALS (MULTIVITAMIN ADULT PO)    Take 1 tablet by mouth.     NUTRITIONAL SUPPLEMENTS (BALANCED NUTRIONAL DRINK PLUS) LIQUID    Kefir drink one can a day.    NYSTATIN (MYCOSTATIN) 991021 UNIT/GM CREAM    Apply topically 2 times daily.    OXYBUTYNIN (DITROPAN-XL) 10 MG 24 HR TABLET    Take 10 mg by mouth daily.    PANTOPRAZOLE (PROTONIX) 40 MG TABLET    Take 1 tablet by mouth daily.    POTASSIUM CHLORIDE 20 MEQ/15ML (10%) SOLUTION    Take 15 mLs by mouth daily.    SILVER SULFADIAZINE (THERMAZENE) 1 % CREAM    Apply topically daily. Apply to a thickness of 1/16 inch (\"nickel thick\").    SKIN PROTECTANTS, MISC. (CALAZIME SKIN PROTECTANT) PASTE    Apply 1 application topically 2 times daily. Apply bid and prn to coccyx pressure wound       New Prescriptions    No medications on file       Past Medical History:   Diagnosis Date   • Acute renal failure (CMS/[facility]) 2012    resolved   • Arthritis    • Blood clot associated with vein wall inflammation    • BPH (benign prostatic hyperplasia)    • CAD (coronary artery disease)    • Cataracts, both eyes    • Chronic recurrent pilonidal cyst without abscess 5/26/2017   • CKD (chronic kidney disease) stage 3, GFR 30-59 ml/min    • COPD (chronic obstructive pulmonary disease) (CMS/[facility])    • Essential (primary)  follow the guidelines below.  Eat and drink as usual until 8 hours before you arrive for surgery. After that, no food or milk.  Drink clear liquids until 2 hours before you arrive. These are liquids you can see through, like water, Gatorade, and Propel Water. They also include plain black coffee and tea (no cream or milk), candy, and breath mints. You can spit out gum when you arrive.  If you drink alcohol: Stop drinking it the night before surgery.  If your care team tells you to take medicine on the morning of surgery, it's okay to take it with a sip of water.  Preventing infection  Shower or bathe the night before and morning of your surgery. Follow the instructions your clinic gave you. (If no instructions, use regular soap.)  Don't shave or clip hair near your surgery site. We'll remove the hair if needed.  Don't smoke or vape the morning of surgery. You may chew nicotine gum up to 2 hours before surgery. A nicotine patch is okay.  Note: Some surgeries require you to completely quit smoking and nicotine. Check with your surgeon.  Your care team will make every effort to keep you safe from infection. We will:  Clean our hands often with soap and water (or an alcohol-based hand rub).  Clean the skin at your surgery site with a special soap that kills germs.  Give you a special gown to keep you warm. (Cold raises the risk of infection.)  Wear special hair covers, masks, gowns and gloves during surgery.  Give antibiotic medicine, if prescribed. Not all surgeries need antibiotics.  What to bring on the day of surgery  Photo ID and insurance card  Copy of your health care directive, if you have one  Glasses and hearing aids (bring cases)  You can't wear contacts during surgery  Inhaler and eye drops, if you use them (tell us about these when you arrive)  CPAP machine or breathing device, if you use them  A few personal items, if spending the night  If you have . . .  A pacemaker, ICD (cardiac defibrillator) or other  hypertension    • Gastroesophageal reflux disease    • Glaucoma    • History of tobacco use    • Hyperlipidemia    • PUD (peptic ulcer disease)    • RAD (reactive airway disease)    • Rhabdomyolysis    • Second degree heart block    • Severe protein-calorie malnutrition (CMS/HCC)    • Stroke (CMS/HCC)    • Thyroid disease    • Urinary incontinence        Past Surgical History:   Procedure Laterality Date   • ABDOMEN SURGERY     • CARDIAC SURGERY     • CARPAL TUNNEL RELEASE  bilateral   • COLON SURGERY     • CORONARY ARTERY BYPASS GRAFT     • EYE SURGERY     • JOINT REPLACEMENT  10/2012    left knee   • KNEE ARTHROPLASTY     • PTCA     • SERVICE TO GASTROENTEROLOGY         Family History   Problem Relation Age of Onset   • Arthritis Mother    • Stroke Mother    • Arthritis Father    • Vision Loss Father      GLUCOMA   • Diabetes Son    • High cholesterol Son        Social History   Substance Use Topics   • Smoking status: Former Smoker     Years: 5.00     Types: Cigarettes     Start date: 1/1/1949     Quit date: 9/18/1968   • Smokeless tobacco: Never Used      Comment: Quit smoking 1968   • Alcohol use 0.6 oz/week     1 Standard drinks or equivalent per week      Comment: occasional, wine       Review of Systems   Constitutional: Positive for appetite change and fatigue. Negative for chills and fever.   HENT: Negative for sore throat and trouble swallowing.    Eyes: Negative for discharge and redness.   Respiratory: Positive for cough. Negative for chest tightness, shortness of breath and wheezing.    Cardiovascular: Negative for chest pain and leg swelling.   Gastrointestinal: Positive for abdominal pain and diarrhea. Negative for nausea and vomiting.   Genitourinary: Negative for difficulty urinating, dysuria and flank pain.   Musculoskeletal: Negative for arthralgias, gait problem, neck pain and neck stiffness.   Skin: Positive for wound (sore on buttock). Negative for color change and rash.   Neurological:  implant: Bring the ID card.  An implanted stimulator: Bring the remote control.  A legal guardian: Bring a copy of the certified (court-stamped) guardianship papers.  Please remove any jewelry, including body piercings. Leave jewelry and other valuables at home.  If you're going home the day of surgery  You must have a responsible adult drive you home. They should stay with you overnight as well.  If you don't have someone to stay with you, and you aren't safe to go home alone, we may keep you overnight. Insurance often won't pay for this.  After surgery  If it's hard to control your pain or you need more pain medicine, please call your surgeon's office.  Questions?   If you have any questions for your care team, list them here: _________________________________________________________________________________________________________________________________________________________________________ ____________________________________ ____________________________________ ____________________________________  For informational purposes only. Not to replace the advice of your health care provider. Copyright   2003, 2019 [facility]. All rights reserved. Clinically reviewed by Monika Mauro MD. SMART[employer] 750395 - REV 12/22.    How to Take Your Medication Before Surgery  - Take all of your medications before surgery as usual     Positive for weakness (right sided from stroke at baseline). Negative for numbness.   Psychiatric/Behavioral: The patient is not nervous/anxious.        Physical Exam     ED Triage Vitals   ED Triage Vitals Group      Temp 02/26/18 1725 99 °F (37.2 °C)      Pulse 02/26/18 1725 72      Resp 02/26/18 1725 16      BP 02/26/18 1725 136/88      SpO2 02/26/18 1723 94 %      EtCO2 mmHg --       Height --       Weight --       Weight Scale Used --        Physical Exam   Constitutional: He appears well-developed and well-nourished. No distress.   HENT:   Head: Normocephalic and atraumatic.   Dry mucous membranes   Eyes: Pupils are equal, round, and reactive to light. Right eye exhibits no discharge. Left eye exhibits no discharge. No scleral icterus.   Neck: Normal range of motion. Neck supple.   Cardiovascular: Normal rate, regular rhythm, normal heart sounds and intact distal pulses.  Exam reveals no gallop and no friction rub.    No murmur heard.  Pulmonary/Chest: Effort normal and breath sounds normal. No respiratory distress. He has no wheezes. He has no rales.   Abdominal: Soft. Bowel sounds are normal. He exhibits no distension and no mass. There is tenderness in the suprapubic area. There is no rigidity, no rebound and no guarding.   Musculoskeletal: Normal range of motion. He exhibits no edema or tenderness.   Neurological: He is alert.   Right-sided facial droop and weakness   Skin: Skin is warm and dry. No rash noted. He is not diaphoretic. No erythema. No pallor.   Sacral decubitus ulcer with visible muscle. No surrounding erythema or drainage.  Padded bandage on on arrival.   Psychiatric: He has a normal mood and affect.   Nursing note and vitals reviewed.      ED Course     Procedures    Lab Results     Results for orders placed or performed during the hospital encounter of 02/26/18   CBC & Auto Differential   Result Value Ref Range    WBC 12.8 (H) 4.2 - 11.0 K/mcL    RBC 3.20 (L) 4.50 - 5.90 mil/mcL    HGB 9.9  (L) 13.0 - 17.0 g/dL    HCT 31.4 (L) 39.0 - 51.0 %    MCV 98.1 78.0 - 100.0 fl    MCH 30.9 26.0 - 34.0 pg    MCHC 31.5 (L) 32.0 - 36.5 g/dL    RDW-CV 14.8 11.0 - 15.0 %     140 - 450 K/mcL    DIFF TYPE AUTOMATED DIFFERENTIAL     Neutrophil 87 %    LYMPH 8 %    MONO 5 %    EOSIN 0 %    BASO 0 %    Absolute Neutrophil 11.1 (H) 1.8 - 7.7 K/mcL    Absolute Lymph 1.0 1.0 - 4.0 K/mcL    Absolute Mono 0.7 0.3 - 0.9 K/mcL    Absolute Eos 0.0 (L) 0.1 - 0.5 K/mcL    Absolute Baso 0.0 0.0 - 0.3 K/mcL   Prothrombin Time   Result Value Ref Range    PROTIME 11.7 9.7 - 11.8 sec    INR 1.1    Comprehensive Metabolic Panel   Result Value Ref Range    Sodium 141 135 - 145 mmol/L    Potassium 4.4 3.4 - 5.1 mmol/L    Chloride 104 98 - 107 mmol/L    Carbon Dioxide 27 21 - 32 mmol/L    Anion Gap 14 10 - 20 mmol/L    Glucose 101 (H) 65 - 99 mg/dL    BUN 74 (H) 6 - 20 mg/dL    Creatinine 1.72 (H) 0.67 - 1.17 mg/dL    GFR Estimate,  40     GFR Estimate, Non African American 35     BUN/Creatinine Ratio 43 (H) 7 - 25    CALCIUM 8.7 8.4 - 10.2 mg/dL    TOTAL BILIRUBIN 0.3 0.2 - 1.0 mg/dL    AST/SGOT 43 (H) <38 Units/L    ALT/SGPT 38 <79 Units/L    ALK PHOSPHATASE 93 45 - 117 Units/L    TOTAL PROTEIN 7.1 6.4 - 8.2 g/dL    Albumin 1.6 (L) 3.6 - 5.1 g/dL    GLOBULIN 5.5 (H) 2.0 - 4.0 g/dL    A/G Ratio, Serum 0.3 (L) 1.0 - 2.4   Creatine Kinase   Result Value Ref Range    CPK 22 (L) 39 - 308 Units/L   Troponin I Ultra Sensitive   Result Value Ref Range    TROPONIN I 0.19 (HH) <0.05 ng/mL   Urinalysis with Micro & Culture if Indicated   Result Value Ref Range    COLOR YELLOW YELLOW    APPEARANCE CLEAR     GLUCOSE(URINE) NEGATIVE NEGATIVE mg/dL    BILIRUBIN NEGATIVE NEGATIVE    KETONES NEGATIVE NEGATIVE mg/dL    SPECIFIC GRAVITY <1.005 (L) 1.005 - 1.030    BLOOD NEGATIVE NEGATIVE    pH 5.5 5.0 - 7.0 Units    PROTEIN(URINE) NEGATIVE NEGATIVE mg/dL    UROBILINOGEN 0.2 0.0 - 1.0 mg/dL    NITRITE POSITIVE (A) NEGATIVE    LEUKOCYTE  ESTERASE MODERATE (A) NEGATIVE    Squamous EPI'S NONE SEEN 0 - 5 /hpf    RBC NONE SEEN 0 - 3 /hpf    WBC 26 to 100 0 - 5 /hpf    BACTERIA LARGE (A) NONE SEEN /hpf    Hyaline Casts NONE SEEN 0 - 5 /lpf    SPECIMEN TYPE URINE, CATHETERIZED, STRAIGHT    Lipase Level   Result Value Ref Range    Lipase 87 73 - 393 Units/L   Lactic Acid Venous   Result Value Ref Range    Lactic Acid Venous 1.2 0 - 2.0 mmol/L   Procalcitonin   Result Value Ref Range    PROCALCITONIN 6.23 (H) <0.10 ng/mL   Magnesium Level   Result Value Ref Range    MAGNESIUM 2.6 (H) 1.7 - 2.4 mg/dL       EKG Results     EKG Interpretation 17:36  Rate: 62  Rhythm: atrial flutter   Abnormality: Left axis deviation, flutter with variable block, no acute ST segment or T-wave changes    EKG interpreted by ED physician    Radiology Results     Imaging Results          XR Chest AP or PA (Final result)  Result time 02/26/18 18:42:45    Final result                 Impression:    IMPRESSION:  1. Right upper/mid and retrocardiac infiltrates.  2. Pulmonary vascular congestion.               Narrative:      EXAM: Portable AP chest    DATE: 02/26/18    COMPARISON: 05/31/17 and earlier    CLINICAL HISTORY: Weakness    FINDINGS:     Diffuse airspace opacity is present in the right mid and upper lung.  Retrocardiac airspace opacity is also noted. Pulmonary vascular congestion  is identified.    There are no pleural effusions. The heart size is stable. The aorta is  tortuous and demonstrates atherosclerotic calcifications.     Degenerative changes of the spine are present.                                ED Medication Orders     Start Ordered     Status Ordering Provider    02/26/18 1849 02/26/18 1848  levofloxacin (LEVAQUIN) in dextrose 5% premix IVPB 750 mg  ONCE      Last MAR action:  OSMEL Hines    02/26/18 1849 02/26/18 1848  piperacillin-tazobactam (ZOSYN) 4.5 g in sodium chloride 0.9 % 100 mL IVPB  ONCE      Last MAR action:  OSMEL Hines     02/26/18 1819 02/26/18 1818  cefTRIAXone (ROCEPHIN) syringe 1,000 mg  ONCE      Last MAR action:  Given OSMEL GARCIA    02/26/18 1726 02/26/18 1725  sodium chloride (PF) 0.9 % injection 2 mL  (Capped IV)  ONCE      Acknowledged OSMEL GARCIA    02/26/18 1725 02/26/18 1725  sodium chloride (NORMAL SALINE) 0.9 % bolus 500 mL  ONCE      Last MAR action:  Completed OSMEL GARCIA    02/26/18 1724 02/26/18 1725  sodium chloride (PF) 0.9 % injection 2 mL  (Capped IV)  PRN      Acknowledged OSMEL GARCIA          [facility]  Patient is an 88-year-old male from a nursing facility with a declining condition. Awaiting family for additional information. Patient reports decreased appetite, fatigue, diarrhea, cough. He's had a stroke with a right-sided deficit. He has a sacral decubitus ulcer that does not appear acutely infected. Some mild suprapubic discomfort. Evaluating with labs for UTI, dehydration, electrolyte abnormality. I'm sending him for a chest x-ray. Awaiting family for additional information.    5:58 PM. Patient's sons arrived. They essentially confirmed the information that the patient provided me. Seemed a little more confused than his baseline, one episode of vomiting, decreased oral intake, reporting some abdominal discomfort. Has a history of aspiration pneumonia but not for about a year.    6:30 PM. Urine is strongly infected and I'm ordering a dose Rocephin.    7:10 PM. Patient's chest x-ray shows right-sided pneumonia. I'm going to cover him with Zosyn and Levaquin to cover aspiration.    7:32 PM. Discussed with Dr. Rene of the hospitalist service who will admit.    Clinical Impression     ED Diagnosis   1. Pneumonia of both lungs due to infectious organism, unspecified part of lung     2. Renal insufficiency     3. Troponin level elevated     4. Acute UTI         Disposition        Admit 2/26/2018  7:32 PM  Patient accepted and admission order received from:: SOFIYA RENE [[id number]]  Patient Class: Inpatient  [1]  Patient on Telemetry: Yes  Has physician to physician communication occurred?: Yes  Transferring Patient to? Only adjust for transfers between [facility]: [facility] [[id number]]                  Akhil Maria MD  02/26/18 1933

## 2023-11-30 ENCOUNTER — HOSPITAL ENCOUNTER (OUTPATIENT)
Facility: CLINIC | Age: 38
Discharge: HOME | End: 2023-11-30
Attending: SURGERY | Admitting: SURGERY
Payer: COMMERCIAL

## 2023-11-30 ENCOUNTER — ANESTHESIA (OUTPATIENT)
Dept: SURGERY | Facility: CLINIC | Age: 38
End: 2023-11-30
Payer: COMMERCIAL

## 2023-11-30 ENCOUNTER — APPOINTMENT (OUTPATIENT)
Dept: SURGERY | Facility: PHYSICIAN GROUP | Age: 38
End: 2023-11-30
Payer: COMMERCIAL

## 2023-11-30 ENCOUNTER — ANESTHESIA EVENT (OUTPATIENT)
Dept: SURGERY | Facility: CLINIC | Age: 38
End: 2023-11-30
Payer: COMMERCIAL

## 2023-11-30 VITALS
BODY MASS INDEX: 32.38 KG/M2 | HEART RATE: 84 BPM | TEMPERATURE: 96.5 F | RESPIRATION RATE: 16 BRPM | WEIGHT: 182.8 LBS | OXYGEN SATURATION: 97 % | SYSTOLIC BLOOD PRESSURE: 90 MMHG | DIASTOLIC BLOOD PRESSURE: 61 MMHG

## 2023-11-30 DIAGNOSIS — K80.50 BILIARY COLIC: ICD-10-CM

## 2023-11-30 PROCEDURE — 272N000001 HC OR GENERAL SUPPLY STERILE: Performed by: SURGERY

## 2023-11-30 PROCEDURE — 250N000009 HC RX 250: Performed by: SURGERY

## 2023-11-30 PROCEDURE — 370N000017 HC ANESTHESIA TECHNICAL FEE, PER MIN: Performed by: SURGERY

## 2023-11-30 PROCEDURE — 250N000009 HC RX 250: Performed by: ANESTHESIOLOGY

## 2023-11-30 PROCEDURE — 88304 TISSUE EXAM BY PATHOLOGIST: CPT | Mod: TC | Performed by: SURGERY

## 2023-11-30 PROCEDURE — 250N000013 HC RX MED GY IP 250 OP 250 PS 637: Performed by: SURGERY

## 2023-11-30 PROCEDURE — 360N000076 HC SURGERY LEVEL 3, PER MIN: Performed by: SURGERY

## 2023-11-30 PROCEDURE — 999N000141 HC STATISTIC PRE-PROCEDURE NURSING ASSESSMENT: Performed by: SURGERY

## 2023-11-30 PROCEDURE — 258N000003 HC RX IP 258 OP 636: Performed by: NURSE ANESTHETIST, CERTIFIED REGISTERED

## 2023-11-30 PROCEDURE — 258N000003 HC RX IP 258 OP 636: Performed by: ANESTHESIOLOGY

## 2023-11-30 PROCEDURE — 710N000012 HC RECOVERY PHASE 2, PER MINUTE: Performed by: SURGERY

## 2023-11-30 PROCEDURE — 47562 LAPAROSCOPIC CHOLECYSTECTOMY: CPT | Performed by: SURGERY

## 2023-11-30 PROCEDURE — 258N000001 HC RX 258: Performed by: SURGERY

## 2023-11-30 PROCEDURE — 250N000011 HC RX IP 250 OP 636: Performed by: SURGERY

## 2023-11-30 PROCEDURE — 47562 LAPAROSCOPIC CHOLECYSTECTOMY: CPT | Mod: AS | Performed by: PHYSICIAN ASSISTANT

## 2023-11-30 PROCEDURE — 250N000011 HC RX IP 250 OP 636: Performed by: NURSE ANESTHETIST, CERTIFIED REGISTERED

## 2023-11-30 PROCEDURE — 250N000009 HC RX 250: Performed by: NURSE ANESTHETIST, CERTIFIED REGISTERED

## 2023-11-30 PROCEDURE — 250N000025 HC SEVOFLURANE, PER MIN: Performed by: SURGERY

## 2023-11-30 PROCEDURE — 710N000009 HC RECOVERY PHASE 1, LEVEL 1, PER MIN: Performed by: SURGERY

## 2023-11-30 PROCEDURE — 88304 TISSUE EXAM BY PATHOLOGIST: CPT | Mod: 26 | Performed by: PATHOLOGY

## 2023-11-30 PROCEDURE — 250N000011 HC RX IP 250 OP 636: Performed by: ANESTHESIOLOGY

## 2023-11-30 RX ORDER — SODIUM CHLORIDE, SODIUM LACTATE, POTASSIUM CHLORIDE, CALCIUM CHLORIDE 600; 310; 30; 20 MG/100ML; MG/100ML; MG/100ML; MG/100ML
INJECTION, SOLUTION INTRAVENOUS CONTINUOUS
Status: DISCONTINUED | OUTPATIENT
Start: 2023-11-30 | End: 2023-11-30 | Stop reason: HOSPADM

## 2023-11-30 RX ORDER — HYDROCODONE BITARTRATE AND ACETAMINOPHEN 5; 325 MG/1; MG/1
1 TABLET ORAL
Status: COMPLETED | OUTPATIENT
Start: 2023-11-30 | End: 2023-11-30

## 2023-11-30 RX ORDER — ONDANSETRON 2 MG/ML
4 INJECTION INTRAMUSCULAR; INTRAVENOUS EVERY 30 MIN PRN
Status: DISCONTINUED | OUTPATIENT
Start: 2023-11-30 | End: 2023-11-30 | Stop reason: HOSPADM

## 2023-11-30 RX ORDER — ALBUTEROL SULFATE 0.83 MG/ML
2.5 SOLUTION RESPIRATORY (INHALATION) EVERY 4 HOURS PRN
Status: DISCONTINUED | OUTPATIENT
Start: 2023-11-30 | End: 2023-11-30 | Stop reason: HOSPADM

## 2023-11-30 RX ORDER — OXYCODONE HYDROCHLORIDE 5 MG/1
5 TABLET ORAL
Status: DISCONTINUED | OUTPATIENT
Start: 2023-11-30 | End: 2023-11-30 | Stop reason: HOSPADM

## 2023-11-30 RX ORDER — LIDOCAINE HYDROCHLORIDE 20 MG/ML
INJECTION, SOLUTION INFILTRATION; PERINEURAL PRN
Status: DISCONTINUED | OUTPATIENT
Start: 2023-11-30 | End: 2023-11-30

## 2023-11-30 RX ORDER — ONDANSETRON 4 MG/1
4 TABLET, ORALLY DISINTEGRATING ORAL EVERY 30 MIN PRN
Status: DISCONTINUED | OUTPATIENT
Start: 2023-11-30 | End: 2023-11-30 | Stop reason: HOSPADM

## 2023-11-30 RX ORDER — PROPOFOL 10 MG/ML
INJECTION, EMULSION INTRAVENOUS CONTINUOUS PRN
Status: DISCONTINUED | OUTPATIENT
Start: 2023-11-30 | End: 2023-11-30

## 2023-11-30 RX ORDER — GLYCOPYRROLATE 0.2 MG/ML
INJECTION, SOLUTION INTRAMUSCULAR; INTRAVENOUS PRN
Status: DISCONTINUED | OUTPATIENT
Start: 2023-11-30 | End: 2023-11-30

## 2023-11-30 RX ORDER — LABETALOL HYDROCHLORIDE 5 MG/ML
10 INJECTION, SOLUTION INTRAVENOUS
Status: DISCONTINUED | OUTPATIENT
Start: 2023-11-30 | End: 2023-11-30 | Stop reason: HOSPADM

## 2023-11-30 RX ORDER — PROPOFOL 10 MG/ML
INJECTION, EMULSION INTRAVENOUS PRN
Status: DISCONTINUED | OUTPATIENT
Start: 2023-11-30 | End: 2023-11-30

## 2023-11-30 RX ORDER — SCOLOPAMINE TRANSDERMAL SYSTEM 1 MG/1
1 PATCH, EXTENDED RELEASE TRANSDERMAL ONCE
Status: DISCONTINUED | OUTPATIENT
Start: 2023-11-30 | End: 2023-11-30 | Stop reason: HOSPADM

## 2023-11-30 RX ORDER — HYDROCODONE BITARTRATE AND ACETAMINOPHEN 5; 325 MG/1; MG/1
1-2 TABLET ORAL EVERY 4 HOURS PRN
Qty: 10 TABLET | Refills: 0 | Status: SHIPPED | OUTPATIENT
Start: 2023-11-30 | End: 2024-08-22

## 2023-11-30 RX ORDER — FENTANYL CITRATE 50 UG/ML
INJECTION, SOLUTION INTRAMUSCULAR; INTRAVENOUS PRN
Status: DISCONTINUED | OUTPATIENT
Start: 2023-11-30 | End: 2023-11-30

## 2023-11-30 RX ORDER — OXYCODONE HYDROCHLORIDE 5 MG/1
10 TABLET ORAL
Status: DISCONTINUED | OUTPATIENT
Start: 2023-11-30 | End: 2023-11-30 | Stop reason: HOSPADM

## 2023-11-30 RX ORDER — CEFAZOLIN SODIUM/WATER 2 G/20 ML
2 SYRINGE (ML) INTRAVENOUS
Status: COMPLETED | OUTPATIENT
Start: 2023-11-30 | End: 2023-11-30

## 2023-11-30 RX ORDER — HYDROMORPHONE HCL IN WATER/PF 6 MG/30 ML
0.4 PATIENT CONTROLLED ANALGESIA SYRINGE INTRAVENOUS EVERY 5 MIN PRN
Status: DISCONTINUED | OUTPATIENT
Start: 2023-11-30 | End: 2023-11-30 | Stop reason: HOSPADM

## 2023-11-30 RX ORDER — INDOCYANINE GREEN AND WATER 25 MG
2.5 KIT INJECTION ONCE
Status: COMPLETED | OUTPATIENT
Start: 2023-11-30 | End: 2023-11-30

## 2023-11-30 RX ORDER — FENTANYL CITRATE 50 UG/ML
25 INJECTION, SOLUTION INTRAMUSCULAR; INTRAVENOUS EVERY 5 MIN PRN
Status: DISCONTINUED | OUTPATIENT
Start: 2023-11-30 | End: 2023-11-30 | Stop reason: HOSPADM

## 2023-11-30 RX ORDER — HYDROCODONE BITARTRATE AND ACETAMINOPHEN 5; 325 MG/1; MG/1
1 TABLET ORAL ONCE
Status: DISCONTINUED | OUTPATIENT
Start: 2023-11-30 | End: 2023-11-30 | Stop reason: HOSPADM

## 2023-11-30 RX ORDER — HYDROMORPHONE HCL IN WATER/PF 6 MG/30 ML
0.2 PATIENT CONTROLLED ANALGESIA SYRINGE INTRAVENOUS EVERY 5 MIN PRN
Status: DISCONTINUED | OUTPATIENT
Start: 2023-11-30 | End: 2023-11-30 | Stop reason: HOSPADM

## 2023-11-30 RX ORDER — NEOSTIGMINE METHYLSULFATE 1 MG/ML
VIAL (ML) INJECTION PRN
Status: DISCONTINUED | OUTPATIENT
Start: 2023-11-30 | End: 2023-11-30

## 2023-11-30 RX ORDER — BUPIVACAINE HYDROCHLORIDE 5 MG/ML
INJECTION, SOLUTION EPIDURAL; INTRACAUDAL PRN
Status: DISCONTINUED | OUTPATIENT
Start: 2023-11-30 | End: 2023-11-30 | Stop reason: HOSPADM

## 2023-11-30 RX ORDER — DEXAMETHASONE SODIUM PHOSPHATE 4 MG/ML
INJECTION, SOLUTION INTRA-ARTICULAR; INTRALESIONAL; INTRAMUSCULAR; INTRAVENOUS; SOFT TISSUE PRN
Status: DISCONTINUED | OUTPATIENT
Start: 2023-11-30 | End: 2023-11-30

## 2023-11-30 RX ORDER — HYDRALAZINE HYDROCHLORIDE 20 MG/ML
2.5-5 INJECTION INTRAMUSCULAR; INTRAVENOUS EVERY 10 MIN PRN
Status: DISCONTINUED | OUTPATIENT
Start: 2023-11-30 | End: 2023-11-30 | Stop reason: HOSPADM

## 2023-11-30 RX ORDER — LIDOCAINE 40 MG/G
CREAM TOPICAL
Status: DISCONTINUED | OUTPATIENT
Start: 2023-11-30 | End: 2023-11-30 | Stop reason: HOSPADM

## 2023-11-30 RX ORDER — CEFAZOLIN SODIUM/WATER 2 G/20 ML
2 SYRINGE (ML) INTRAVENOUS SEE ADMIN INSTRUCTIONS
Status: DISCONTINUED | OUTPATIENT
Start: 2023-11-30 | End: 2023-11-30 | Stop reason: HOSPADM

## 2023-11-30 RX ORDER — ONDANSETRON 2 MG/ML
INJECTION INTRAMUSCULAR; INTRAVENOUS PRN
Status: DISCONTINUED | OUTPATIENT
Start: 2023-11-30 | End: 2023-11-30

## 2023-11-30 RX ORDER — FENTANYL CITRATE 50 UG/ML
50 INJECTION, SOLUTION INTRAMUSCULAR; INTRAVENOUS EVERY 5 MIN PRN
Status: DISCONTINUED | OUTPATIENT
Start: 2023-11-30 | End: 2023-11-30 | Stop reason: HOSPADM

## 2023-11-30 RX ADMIN — NEOSTIGMINE METHYLSULFATE 4 MG: 1 INJECTION, SOLUTION INTRAVENOUS at 08:11

## 2023-11-30 RX ADMIN — FENTANYL CITRATE 100 MCG: 50 INJECTION INTRAMUSCULAR; INTRAVENOUS at 07:33

## 2023-11-30 RX ADMIN — PHENYLEPHRINE HYDROCHLORIDE 100 MCG: 10 INJECTION INTRAVENOUS at 07:39

## 2023-11-30 RX ADMIN — MIDAZOLAM 2 MG: 1 INJECTION INTRAMUSCULAR; INTRAVENOUS at 07:29

## 2023-11-30 RX ADMIN — GLYCOPYRROLATE 0.8 MG: 0.2 INJECTION, SOLUTION INTRAMUSCULAR; INTRAVENOUS at 08:11

## 2023-11-30 RX ADMIN — SCOPALAMINE 1 PATCH: 1 PATCH, EXTENDED RELEASE TRANSDERMAL at 07:04

## 2023-11-30 RX ADMIN — SUGAMMADEX 100 MG: 100 INJECTION, SOLUTION INTRAVENOUS at 08:19

## 2023-11-30 RX ADMIN — PROPOFOL 200 MG: 10 INJECTION, EMULSION INTRAVENOUS at 07:33

## 2023-11-30 RX ADMIN — DEXAMETHASONE SODIUM PHOSPHATE 8 MG: 4 INJECTION, SOLUTION INTRA-ARTICULAR; INTRALESIONAL; INTRAMUSCULAR; INTRAVENOUS; SOFT TISSUE at 07:33

## 2023-11-30 RX ADMIN — FENTANYL CITRATE 25 MCG: 50 INJECTION INTRAMUSCULAR; INTRAVENOUS at 08:53

## 2023-11-30 RX ADMIN — Medication 2 G: at 07:29

## 2023-11-30 RX ADMIN — INDOCYANINE GREEN AND WATER 2.5 MG: KIT at 06:52

## 2023-11-30 RX ADMIN — PHENYLEPHRINE HYDROCHLORIDE 100 MCG: 10 INJECTION INTRAVENOUS at 07:56

## 2023-11-30 RX ADMIN — SODIUM CHLORIDE, POTASSIUM CHLORIDE, SODIUM LACTATE AND CALCIUM CHLORIDE: 600; 310; 30; 20 INJECTION, SOLUTION INTRAVENOUS at 07:29

## 2023-11-30 RX ADMIN — LIDOCAINE HYDROCHLORIDE 50 MG: 20 INJECTION, SOLUTION INFILTRATION; PERINEURAL at 07:33

## 2023-11-30 RX ADMIN — SODIUM CHLORIDE, POTASSIUM CHLORIDE, SODIUM LACTATE AND CALCIUM CHLORIDE: 600; 310; 30; 20 INJECTION, SOLUTION INTRAVENOUS at 08:48

## 2023-11-30 RX ADMIN — FENTANYL CITRATE 50 MCG: 50 INJECTION INTRAMUSCULAR; INTRAVENOUS at 08:48

## 2023-11-30 RX ADMIN — ROCURONIUM BROMIDE 40 MG: 50 INJECTION, SOLUTION INTRAVENOUS at 07:33

## 2023-11-30 RX ADMIN — ONDANSETRON 4 MG: 2 INJECTION INTRAMUSCULAR; INTRAVENOUS at 08:02

## 2023-11-30 RX ADMIN — PHENYLEPHRINE HYDROCHLORIDE 100 MCG: 10 INJECTION INTRAVENOUS at 07:51

## 2023-11-30 RX ADMIN — HYDROCODONE BITARTRATE AND ACETAMINOPHEN 1 TABLET: 5; 325 TABLET ORAL at 09:10

## 2023-11-30 RX ADMIN — PROPOFOL 100 MCG/KG/MIN: 10 INJECTION, EMULSION INTRAVENOUS at 07:33

## 2023-11-30 RX ADMIN — FENTANYL CITRATE 25 MCG: 50 INJECTION INTRAMUSCULAR; INTRAVENOUS at 09:37

## 2023-11-30 ASSESSMENT — ACTIVITIES OF DAILY LIVING (ADL)
ADLS_ACUITY_SCORE: 35
ADLS_ACUITY_SCORE: 35

## 2023-11-30 NOTE — ANESTHESIA CARE TRANSFER NOTE
Patient: Alexi Montemayor    Procedure: Procedure(s):  CHOLECYSTECTOMY, LAPAROSCOPIC       Diagnosis: Biliary colic [K80.50]  Diagnosis Additional Information: No value filed.    Anesthesia Type:   General     Note:    Oropharynx: oropharynx clear of all foreign objects  Level of Consciousness: awake  Oxygen Supplementation: face mask  Level of Supplemental Oxygen (L/min / FiO2): 6  Independent Airway: airway patency satisfactory and stable  Dentition: dentition unchanged  Vital Signs Stable: post-procedure vital signs reviewed and stable  Report to RN Given: handoff report given  Patient transferred to: PACU    Handoff Report: Identifed the Patient, Identified the Reponsible Provider, Reviewed the pertinent medical history, Discussed the surgical course, Reviewed Intra-OP anesthesia mangement and issues during anesthesia, Set expectations for post-procedure period and Allowed opportunity for questions and acknowledgement of understanding      Vitals:  Vitals Value Taken Time   BP     Temp     Pulse 94 11/30/23 0825   Resp 7 11/30/23 0825   SpO2 100 % 11/30/23 0825   Vitals shown include unfiled device data.    Electronically Signed By: SANTOSH Chappell CRNA  November 30, 2023  8:26 AM

## 2023-11-30 NOTE — OP NOTE
[facility] General Surgery Operative Note    Pre-operative diagnosis: symptomatic gallstones   Post-operative diagnosis: same   Procedure: laparoscopic cholecystectomy   Surgeon: Parish Dawkins MD   Assistant(s): Charleen Biggs PA-C  The Physician Assistant was medically necessary for their expertise in prepping, camera management, suctioning, suturing and retraction.   Anesthesia: general   Estimated blood loss:  Specimen: 10 cc  gallbladder and contents               DESCRIPTION OF PROCEDURE:  The patient was taken to the operating room and placed on the table in supine position.  General endotracheal anesthesia was induced and the abdomen was prepped and draped in standard sterile fashion.  An incision above the umbilicus was made with a blade.  The incision was carried down to the fascia.  The fascia was incised in the midline with a blade.  The peritoneum was entered bluntly with a Carmalt clamp.  Two interrupted 0 Vicryl sutures were placed at the extremes of this fascial incision.  The Rosalba trocar was introduced and the abdomen was insufflated with CO2.  A 5 mm trocar was placed in the subxiphoid position.  A 5 mm trocar was placed in the right upper quadrant, just below the costal margin at the midclavicular line.  Another was placed at the anterior axillary line just below the costal margin on the right.  The patient was placed in reverse Trendelenburg and right side up.  The gallbladder appeared normal but with stones in the infundibulum.  The fundus of the gallbladder was grasped and retracted cephalad.  The infundibulum was grasped and retracted laterally.  The peritoneum over the medial and lateral aspects of the triangle of Calot was taken down with the [place] dissector and modest amounts of Bovie electrocautery.  The cystic duct and artery were freed up from surrounding tissues.  The triangle of Calot was skeletonized revealing the critical view of safety.  This revealed an  additional posterior branch which was clipped separately.  The cystic artery and duct were each clipped twice proximally, once distally and transected with the hook scissors.  The gallbladder was then removed from the liver using the hook electrocautery.  The gallbladder was passed into an Endocatch bag and removed through the umbilical trocar site.  We observed the right upper quadrant carefully for hemostasis.  Hemostasis was assured.  We irrigated with copious amounts of sterile saline and aspirated the effluent.  The right upper quadrant trocar sites were anesthetized with local anesthetic.  Each of the trocars was removed under direct visualization.  There was no bleeding from any of these sites.  The Rosalba trocar was removed and the abdomen was evacuated of CO2. An additional interrupted 0 Vicryl suture was placed in the umbilical trocar site fascia.  Each of the three 0 Vicryl sutures was cinched down and tied.  The skin of the umbilical incision was anesthetized with local anesthetic.  All of the incisions were closed with interrupted 4-0 Vicryl subcuticular sutures and Dermabond.  The patient tolerated the procedure well.  Sponge and instrument counts were correct.    Parish Dawkins MD

## 2023-11-30 NOTE — ANESTHESIA PREPROCEDURE EVALUATION
Anesthesia Pre-Procedure Evaluation    Patient: Alexi Montemayor   MRN: [id number] : 1985        Procedure : Procedure(s):  CHOLECYSTECTOMY, LAPAROSCOPIC          Past Medical History:   Diagnosis Date    Anxiety     Depression       Past Surgical History:   Procedure Laterality Date    EXTRACTION(S) DENTAL      Skin mole removal        Allergies   Allergen Reactions    Pcn [Penicillins] Hives    Sulfa Antibiotics Hives      Social History     Tobacco Use    Smoking status: Never     Passive exposure: Never    Smokeless tobacco: Never   Substance Use Topics    Alcohol use: Not Currently     Comment: Rarely      Wt Readings from Last 1 Encounters:   23 82.9 kg (182 lb 12.8 oz)        Anesthesia Evaluation            ROS/MED HX  ENT/Pulmonary:  - neg pulmonary ROS     Neurologic:       Cardiovascular:  - neg cardiovascular ROS     METS/Exercise Tolerance:     Hematologic:       Musculoskeletal:       GI/Hepatic:       Renal/Genitourinary:       Endo:     (+)               Obesity (BMI 32.8),       Psychiatric/Substance Use:     (+) psychiatric history anxiety and depression       Infectious Disease:       Malignancy:       Other:            Physical Exam    Airway        Mallampati: II   TM distance: > 3 FB   Neck ROM: full   Mouth opening: > 3 cm    Respiratory Devices and Support         Dental           Cardiovascular   cardiovascular exam normal          Pulmonary   pulmonary exam normal                OUTSIDE LABS:  CBC:   Lab Results   Component Value Date    WBC 7.4 2023    WBC 7.0 10/17/2023    HGB 12.6 2023    HGB 11.8 10/17/2023    HCT 39.6 2023    HCT 36.5 10/17/2023     2023     10/17/2023     BMP:   Lab Results   Component Value Date     2023     10/17/2023    POTASSIUM 3.7 2023    POTASSIUM 3.4 10/17/2023    CHLORIDE 99 2023    CHLORIDE 102 10/17/2023    CO2 27 2023    CO2 25 10/17/2023    BUN 12.7 2023    BUN 9.5  "10/17/2023    CR 0.81 11/23/2023    CR 0.71 10/17/2023     (H) 11/23/2023     (H) 10/17/2023     COAGS: No results found for: \"PTT\", \"INR\", \"FIBR\"  POC:   Lab Results   Component Value Date    HCGS Negative 11/23/2023     HEPATIC:   Lab Results   Component Value Date    ALBUMIN 4.7 11/23/2023    PROTTOTAL 8.0 11/23/2023    ALT 10 11/23/2023    AST 12 11/23/2023    ALKPHOS 64 11/23/2023    BILITOTAL 0.3 11/23/2023     OTHER:   Lab Results   Component Value Date    A1C 5.4 08/18/2023    MAGNOLIA 8.7 11/23/2023    LIPASE 19 11/23/2023    TSH 2.08 08/18/2023    SED 28 (H) 09/02/2023       Anesthesia Plan    ASA Status:  2    NPO Status:  NPO Appropriate    Anesthesia Type: General.     - Airway: ETT   Induction: Intravenous.   Maintenance: Balanced.        Consents    Anesthesia Plan(s) and associated risks, benefits, and realistic alternatives discussed. Questions answered and patient/representative(s) expressed understanding.     - Discussed:     - Discussed with:  Patient            Postoperative Care    Pain management: IV analgesics, Oral pain medications, Multi-modal analgesia.   PONV prophylaxis: Ondansetron (or other 5HT-3), Dexamethasone or Solumedrol     Comments:               Hanny Hernandez MD    I have reviewed the pertinent notes and labs in the chart from the past 30 days and (re)examined the patient.  Any updates or changes from those notes are reflected in this note.              # Obesity: Estimated body mass index is 32.38 kg/m  as calculated from the following:    Height as of 11/27/23: 1.6 m (5' 3\").    Weight as of this encounter: 82.9 kg (182 lb 12.8 oz).      "

## 2023-11-30 NOTE — DISCHARGE INSTRUCTIONS
HOME CARE FOLLOWING LAPAROSCOPIC CHOLECYSTECTOMY  JESSENIA Mireles, SOLANGE Timmons C. Pratt, J. Shaheen    INCISIONAL CARE:  Replace the bandage over your incisions DAILY until all drainage stops, or if more comfortable to have in place.  If present, leave the steri-strips (white paper tapes) in place for 14 days after surgery.  If Dermabond (a type of skin glue) is present, leave in place until it wears/flakes off (2-3 weeks).     BATHING:  OK to shower 48 hours after surgery.  Avoid baths for 1 week after surgery.  You may wash your hair at any time.  Gently pat your incision dry after bathing.  Do not apply lotions, creams, or ointments to incisions.    ACTIVITY:  Light Activity -- you may immediately be up and about as tolerated.  Walking is encouraged, increase as tolerated.  Driving/Light Work-- when comfortable and off narcotic pain medications.  Strenuous Work/Activity -- limit lifting to 20 pounds for 2 weeks.  Progressively increase with time.  Active Sports (running, biking, etc.) -- cautiously resume after 2 weeks.    DISCOMFORT:  Local anesthetic placed at surgery should provide relief for 4-8 hours.  Begin taking pain pills before discomfort is severe.  Take the pain medication with some food, when possible, to minimize side effects.  Intermittent use of ice packs may help during the first 1-3 weeks after surgery.  Expect gradual improvement.    Over-the-counter anti-inflammatory medications (i.e. Ibuprofen/Advil/Motrin or Naprosyn/Aleve) may be used per package instructions in addition to or while tapering off the narcotic pain medications to decrease swelling and sensitivity.  DO NOT TAKE these Anti-inflammatory medications if your primary physician has advised against doing so, or if you have acid reflux, ulcer, or bleeding disorder, or take blood-thinner medications.  Call your primary physician or the surgery office if you have medication questions.    After laparoscopic  cholecystectomy, you may have shoulder or upper back discomfort due to the gas used during surgery.  This is temporary and should resolve within 2-3 days.  Frequent short walks may help with this.  You may have decreased energy level for 1-2 weeks after surgery related to your recovery.    DIET:  Start with liquids and gradually increase diet as tolerated.  Drink plenty of fluids.  While taking pain medications, consider use of a stool softener, increase your fiber in your diet, or add a fiber supplement (like Metamucil, Citrucel) to help prevent constipation - a possible side effect of pain medications.  It is not uncommon to experience some bowel changes (loose stools or constipation) after surgery.  Your body has to adapt to you no longer having a gall bladder.  To help minimize this side effect, avoid fatty foods for 1-2 weeks after surgery.  You may then slowly increase the amount of fatty foods in your diet.      NAUSEA:  If nauseated from the anesthetic/pain meds; rest in bed, get up cautiously with assistance, and drink clear liquids (juice, tea, broth).    FOLLOW-UP AFTER SURGERY:  -Our office will contact you approximately 2-3 weeks after surgery to check on your progress and answer any questions you may have.  If you are doing well, you will not need to return for an office appointment.  If any concerns are identified over the phone, we will help you make an appointment to see a provider.    -If you have not received a phone call, have any questions or concerns, or would like to be seen, please call us at [phone].  We are located at: [address], [address]; [place] [address]    -CONTACT US IF THE FOLLOWING DEVELOPS:   1. A fever that is above 101     2. Increased redness, warmth, drainage, bleeding, or swelling.   3. Pain that is not relieved by rest/ice and your prescription.   4.  Increasing pain after 48 hours.   5. Drainage that is thick, cloudy, yellow, green or white.   6. Any other  questions or concerns.      FREQUENTLY ASKED QUESTIONS:    Q:  How should my incision look?    A:  Normally your incision will appear slightly swollen with light redness directly along the incision itself as it heals.  It may feel like a bump or ridge as the healing/scarring happens, and over time (3-4 months) this bump or ridge feeling should slowly go away.  In general, clear or pink watery drainage can be normal at first as your incision heals, but should decrease over time.    Q:  How do I know if my incision is infected?  A:  Look at your incision for signs of infection, like redness around the incision spreading to surrounding skin, or drainage of cloudy or foul-smelling drainage.  If you feel warm, check your temperature to see if you are running a fever.    **If any of these things occur, please notify the nurse at our office.  We may need you to come into the office for an incision check.      Q:  How do I take care of my incision?  A:  If you have a dressing in place - Starting the day after surgery, replace the dressing 1-2 times a day until there is no further drainage from the incision.  At that time, a dressing is no longer needed.  Try to minimize tape on the skin if irritation is occurring at the tape sites.  If you have significant irritation from tape on the skin, please call the office to discuss other method of dressing your incision.    Small pieces of tape called  steri-strips  may be present directly overlying your incision; these may be removed 10 days after surgery unless otherwise specified by your surgeon.  If these tapes start to loosen at the ends, you may trim them back until they fall off or are removed.    A:  If you had  Dermabond  tissue glue used as a dressing (this causes your incision to look shiny with a clear covering over it) - This type of dressing wears off with time and does not require more dressings over the top unless it is draining around the glue as it wears off.  Do  not apply ointments or lotions over the incisions until the glue has completely worn off.    Q:  There is a piece of tape or a sticky  lead  still on my skin.  Can I remove this?  A:  Sometimes the sticky  leads  used for monitoring during surgery or for evaluation in the emergency department are not all removed while you are in the hospital.  These sometimes have a tab or metal dot on them.  You can easily remove these on your own, like taking off a band-aid.  If there is a gel substance under the  lead , simply wipe/clean it off with a washcloth or paper towel.      Q:  What can I do to minimize constipation (very hard stools, or lack of stools)?  A:  Stay well hydrated.  Increase your dietary fiber intake or take a fiber supplement -with plenty of water.  Walk around frequently.  You may consider an over-the-counter stool-softener.  Your Pharmacist can assist you with choosing one that is stocked at your pharmacy.  Constipation is also one of the most common side effects of pain medication.  If you are using pain medication, be pro-active and try to PREVENT problems with constipation by taking the steps above BEFORE constipation becomes a problem.    Q:  What do I do if I need more pain medications?  A:  Call the office to receive refills.  Be aware that certain pain meds cannot be called into a pharmacy and actually require a paper prescription.  A change may be made in your pain med as you progress thru your recovery period or if you have side effects to certain meds.    --Pain meds are NOT refilled after 5pm on weekdays, and NOT AT ALL on the weekends, so please look ahead to prevent problems.      Q:  Why am I having a hard time sleeping now that I am at home?  A:  Many medications you receive while you are in the hospital can impact your sleep for a number of days after your surgery/hospitalization.  Decreased level of activity and naps during the day may also make sleeping at night difficult.  Try to  minimize day-time naps, and get up frequently during the day to walk around your home during your recovery time.  Sleep aides may be of some help, but are not recommended for long-term use.      Q:  I am having some back discomfort.  What should I do?  A:  This may be related to certain positioning that was required for your surgery, extended periods of time in bed, or other changes in your overall activity level.  You may try ice, heat, acetaminophen, or ibuprofen to treat this temporarily.  Note that many pain medications have acetaminophen in them and would state this on the prescription bottle.  Be sure not to exceed the maximum of 4000mg per day of acetaminophen.     **If the pain you are having does not resolve, is severe, or is a flare of back pain you have had on other occasions prior to surgery, please contact your primary physician for further recommendations or for an appointment to be examined at their office.    Q:  Why am I having headaches?  A:  Headaches can be caused by many things:  caffeine withdrawal, use of pain meds, dehydration, high blood pressure, lack of sleep, over-activity/exhaustion, flare-up of usual migraine headaches.  If you feel this is related to muscle tension (a band-like feeling around the head, or a pressure at the low-back of the head) you may try ice or heat to this area.  You may need to drink more fluids (try electrolyte drink like Gatorade), rest, or take your usual migraine medications.   **If your headaches do not resolve, worsen, are accompanied by other symptoms, or if your blood pressure is high, please call your primary physician for recommendation and/or examination.    Q:  I am unable to urinate.  What do I do?  A:  A small percentage of people can have difficulty urinating initially after surgery.  This includes being able to urinate only a very small amount at a time and feeling discomfort or pressure in the very low abdomen.  This is called  urinary retention ,  and is actually an urgent situation.  Proceed to your nearest Emergency department for evaluation (not an Urgent Care Center).  Sometimes the bladder does not work correctly after certain medications you receive during surgery, or related to certain procedures.  You may need to have a catheter placed until your bladder recovers.  When planning to go to an Emergency department, it may help to call the ER to let them know you are coming in for this problem after a surgery.  This may help you get in quicker to be evaluated.  **If you have symptoms of a urinary tract infection, please contact your primary physician for the proper evaluation and treatment.          If you have other questions, please call the office Monday thru Friday between 8am and 4:30pm to discuss with the nurse or physician assistant.  [phone]    There is a surgeon ON CALL on weekday evenings and over the weekend in case of urgent need only, and may be contacted at the same number.    If you are having an emergency, call 911 or proceed to your nearest emergency department.        Remove Scope Patch: Tomorrow, Friday, December 1st at 5 PM    How to Remove Scope Patch:  When the scopolamine patch is no longer needed, remove the patch and fold it in half with the sticky side together and dispose of it.  Wash your hands and the area behind your ear thoroughly with soap and water to remove any traces of scopolamine from the area.  Make sure not to touch your eyes after handling the scope patch and wash your hands thoroughly.

## 2023-11-30 NOTE — ANESTHESIA POSTPROCEDURE EVALUATION
Patient: Alexi Montemayor    Procedure: Procedure(s):  CHOLECYSTECTOMY, LAPAROSCOPIC       Anesthesia Type:  General    Note:  Disposition: Outpatient   Postop Pain Control: Uneventful            Sign Out: Well controlled pain   PONV: No   Neuro/Psych: Uneventful            Sign Out: Acceptable/Baseline neuro status   Airway/Respiratory: Uneventful            Sign Out: Acceptable/Baseline resp. status   CV/Hemodynamics: Uneventful            Sign Out: Acceptable CV status; No obvious hypovolemia; No obvious fluid overload   Other NRE:    DID A NON-ROUTINE EVENT OCCUR? No           Last vitals:  Vitals Value Taken Time   /53 11/30/23 0945   Temp 97.4  F (36.3  C) 11/30/23 0916   Pulse 72 11/30/23 0954   Resp 11 11/30/23 0954   SpO2 100 % 11/30/23 0954   Vitals shown include unfiled device data.    Electronically Signed By: Hanny Hernandez MD  November 30, 2023  10:28 AM

## 2023-11-30 NOTE — ANESTHESIA PROCEDURE NOTES
Airway         Procedure Start/Stop Times: 11/30/2023 7:35 AM  Staff -        Anesthesiologist:  Hanny Hernandez MD       CRNA: Khalida Benjamin APRN CRNA       Performed By: CRNA  Consent for Airway        Urgency: elective  Indications and Patient Condition       Indications for airway management: michelle-procedural       Induction type:intravenous       Mask difficulty assessment: 1 - vent by mask    Final Airway Details       Final airway type: endotracheal airway       Successful airway: ETT - single and Oral  Endotracheal Airway Details        ETT size (mm): 7.0       Cuffed: yes       Successful intubation technique: direct laryngoscopy       DL Blade Type: MAC 3       Grade View of Cords: 2       Adjucts: stylet       Position: Right       Measured from: lips       Secured at (cm): 22       Bite block used: Soft    Post intubation assessment        Placement verified by: capnometry, equal breath sounds and chest rise        Number of attempts at approach: 1       Number of other approaches attempted: 0       Secured with: tape       Ease of procedure: easy       Dentition: Unchanged    Medication(s) Administered   Medication Administration Time: 11/30/2023 7:35 AM

## 2023-12-01 LAB
PATH REPORT.COMMENTS IMP SPEC: NORMAL
PATH REPORT.COMMENTS IMP SPEC: NORMAL
PATH REPORT.FINAL DX SPEC: NORMAL
PATH REPORT.GROSS SPEC: NORMAL
PATH REPORT.MICROSCOPIC SPEC OTHER STN: NORMAL
PATH REPORT.RELEVANT HX SPEC: NORMAL
PHOTO IMAGE: NORMAL

## 2023-12-05 ENCOUNTER — DOCUMENTATION ONLY (OUTPATIENT)
Dept: OTHER | Facility: CLINIC | Age: 38
End: 2023-12-05
Payer: COMMERCIAL

## 2023-12-12 ENCOUNTER — OFFICE VISIT (OUTPATIENT)
Dept: OPTOMETRY | Facility: CLINIC | Age: 38
End: 2023-12-12
Payer: COMMERCIAL

## 2023-12-12 DIAGNOSIS — H52.13 MYOPIA OF BOTH EYES: Primary | ICD-10-CM

## 2023-12-12 PROCEDURE — 92015 DETERMINE REFRACTIVE STATE: CPT | Performed by: OPTOMETRIST

## 2023-12-12 PROCEDURE — 92014 COMPRE OPH EXAM EST PT 1/>: CPT | Performed by: OPTOMETRIST

## 2023-12-12 ASSESSMENT — REFRACTION_CURRENTRX
OS_SPHERE: -5.50
OS_BRAND: J&J ACUVUE OASYS 1 DAY WITH HYDRALUXE BC 8.5 D 14.3
OD_SPHERE: -5.50
OD_BRAND: ALCON DAILIES TOTAL 1 BC 8.5 D 14.1
OS_SPHERE: -5.50
OS_BRAND: J&J ACUVUE OASYS BC 8.4, D 14.0
OD_BRAND: J&J ACUVUE OASYS BC 8.4, D 14.0
OD_SPHERE: -5.50

## 2023-12-12 ASSESSMENT — KERATOMETRY
OS_AXISANGLE_DEGREES: 70
OS_K1POWER_DIOPTERS: 44.12
OD_AXISANGLE2_DEGREES: 162
OS_K2POWER_DIOPTERS: 45
OD_AXISANGLE_DEGREES: 72
OS_AXISANGLE2_DEGREES: 160
OD_K2POWER_DIOPTERS: 44.62
OD_K1POWER_DIOPTERS: 44.12

## 2023-12-12 ASSESSMENT — TONOMETRY
OD_IOP_MMHG: 16
OS_IOP_MMHG: 16
IOP_METHOD: APPLANATION

## 2023-12-12 ASSESSMENT — REFRACTION_MANIFEST
OS_SPHERE: -6.25
OD_SPHERE: -5.50
OS_CYLINDER: +0.50
OD_SPHERE: -6.00
OD_AXIS: 179
OS_AXIS: 026
METHOD_AUTOREFRACTION: 1
OD_CYLINDER: +0.75
OS_CYLINDER: SPHERE
OD_CYLINDER: SPHERE
OS_SPHERE: -6.75

## 2023-12-12 ASSESSMENT — VISUAL ACUITY
METHOD: SNELLEN - LINEAR
OD_CC: 20/20
OD_CC: 20/20
OS_CC: 20/20
OS_CC: 20/20
CORRECTION_TYPE: GLASSES

## 2023-12-12 ASSESSMENT — EXTERNAL EXAM - LEFT EYE: OS_EXAM: NORMAL

## 2023-12-12 ASSESSMENT — CONF VISUAL FIELD
OS_SUPERIOR_TEMPORAL_RESTRICTION: 0
OD_SUPERIOR_TEMPORAL_RESTRICTION: 0
OD_SUPERIOR_NASAL_RESTRICTION: 0
OS_NORMAL: 1
OD_NORMAL: 1
OS_SUPERIOR_NASAL_RESTRICTION: 0
OD_INFERIOR_TEMPORAL_RESTRICTION: 0
OS_INFERIOR_TEMPORAL_RESTRICTION: 0
OD_INFERIOR_NASAL_RESTRICTION: 0
OS_INFERIOR_NASAL_RESTRICTION: 0
METHOD: COUNTING FINGERS

## 2023-12-12 ASSESSMENT — REFRACTION_WEARINGRX
OD_CYLINDER: SPHERE
OS_SPHERE: -6.50
SPECS_TYPE: SVL
OD_SPHERE: -6.25
OS_CYLINDER: SPHERE

## 2023-12-12 ASSESSMENT — CUP TO DISC RATIO
OD_RATIO: 0.3
OS_RATIO: 0.25

## 2023-12-12 ASSESSMENT — EXTERNAL EXAM - RIGHT EYE: OD_EXAM: NORMAL

## 2023-12-12 NOTE — LETTER
12/12/2023         RE: Alexi Montemayor  [address] [facility] [id number]        Dear Colleague,    Thank you for referring your patient, Alexi Montemayor, to the [facility]. Please see a copy of my visit note below.    Chief Complaint   Patient presents with     Annual Eye Exam        Last Eye Exam: 09/2021  Dilated Previously: Declined dilation today     What are you currently using to see?  glasses and contacts - would like a contact lens eval today   Wears once a month and disposes      Distance Vision Acuity: Satisfied with vision    Near Vision Acuity: Satisfied with vision while reading and using computer with glasses and contacts     Eye Comfort: good  Do you use eye drops? : No      Minerva Waters - Optometric Assistant           Medical, surgical and family histories reviewed and updated 12/12/2023.       OBJECTIVE: See Ophthalmology exam    ASSESSMENT:    ICD-10-CM    1. Myopia of both eyes  H52.13 EYE EXAM (SIMPLE-NONBILLABLE)     REFRACTION          PLAN:   Refit contact prescription to dailies and dispensed trials   Return to clinic for dilation next visit    Radha Soto OD     Again, thank you for allowing me to participate in the care of your patient.        Sincerely,        Radha Soto, OD

## 2023-12-12 NOTE — PROGRESS NOTES
Chief Complaint   Patient presents with    Annual Eye Exam        Last Eye Exam: 09/2021  Dilated Previously: Declined dilation today     What are you currently using to see?  glasses and contacts - would like a contact lens eval today   Wears once a month and disposes      Distance Vision Acuity: Satisfied with vision    Near Vision Acuity: Satisfied with vision while reading and using computer with glasses and contacts     Eye Comfort: good  Do you use eye drops? : No      Minerva Waters - Optometric Assistant           Medical, surgical and family histories reviewed and updated 12/12/2023.       OBJECTIVE: See Ophthalmology exam    ASSESSMENT:    ICD-10-CM    1. Myopia of both eyes  H52.13 EYE EXAM (SIMPLE-NONBILLABLE)     REFRACTION          PLAN:   Refit contact prescription to dailies and dispensed trials   Return to clinic for dilation next visit    Radha Soto OD

## 2023-12-14 ENCOUNTER — NURSE TRIAGE (OUTPATIENT)
Dept: NURSING | Facility: CLINIC | Age: 38
End: 2023-12-14
Payer: COMMERCIAL

## 2023-12-14 NOTE — TELEPHONE ENCOUNTER
"Nurse Triage SBAR    Is this a 2nd Level Triage? NO    Situation:     Patient reports occasional stomach issues starting one month ago, patient reports she had anxiety before a surgery.  Patient reports gallbladder removal surgery recently on 11/30/23.    Patient states a week ago a \"stomach burning pain\" on left side underneath the ribcage, pain comes and goes.  When patient is coughing, pain can be 6-7-10.  At rest can be no pain.  Patient reports she thinks drinking black coffee is irritating and fruits like pineapple can cause the pain.  Patient states she can press on her stomach and feel pain in the upper abdominal.  Patient reports a similar health issues 6-7 years and was prescribed Priolosec and finished a 6 month course of treatment, patient reports it was better until now.     Background:     Assessment:     Mild stomach pain since surgery, worse with acidic foods and coffee.  Patient reports this is similar to a stomach issue from years ago treated with Prilosec.    Protocol Recommended Disposition:   No disposition on file.    Recommendation:     Care advice given, patient will try bland diet to reduce stomach issues and see if that helps.  Answered patients questions and offered further assistance if symptoms change or worse.  Patient transferred to scheduling for PCP visit.    Reason for Disposition   [1] MILD pain (e.g., does not interfere with normal activities) AND [2] pain comes and goes (cramps) AND [3] present > 48 hours  (Exception: This same abdominal pain is a chronic symptom recurrent or ongoing AND present > 4 weeks.)    Additional Information   Negative: Shock suspected (e.g., cold/pale/clammy skin, too weak to stand, low BP, rapid pulse)   Negative: Difficult to awaken or acting confused (e.g., disoriented, slurred speech)   Negative: Passed out (i.e., lost consciousness, collapsed and was not responding)   Negative: Sounds like a life-threatening emergency to the triager   Negative: " "Followed an abdomen (stomach) injury   Negative: Chest pain   Negative: [1] Abdominal pain AND [2] pregnant < 20 weeks   Negative: [1] Abdominal pain AND [2] pregnant 20 or more weeks   Negative: [1] Abdominal pain AND [2] postpartum (from 0 to 6 weeks after delivery)   Negative: Pain is mainly in upper abdomen  (if needed ask: \"is it mainly above the belly button?\")   Negative: Abdomen bloating or swelling are main symptoms   Negative: [1] SEVERE pain (e.g., excruciating) AND [2] present > 1 hour   Negative: [1] SEVERE pain AND [2] age > 60 years   Negative: [1] Vomiting AND [2] contains red blood or black (\"coffee ground\") material  (Exception: Few red streaks in vomit that only happened once.)   Negative: Blood in bowel movements  (Exception: Blood on surface of BM with constipation.)   Negative: Black or tarry bowel movements  (Exception: Chronic-unchanged black-grey BMs AND is taking iron pills or Pepto-Bismol.)   Negative: [1] Vomiting AND [2] contains bile (green color)   Negative: Patient sounds very sick or weak to the triager   Negative: [1] MILD-MODERATE pain AND [2] constant AND [3] present > 2 hours   Negative: [1] Vomiting AND [2] abdomen looks much more swollen than usual   Negative: White of the eyes have turned yellow (i.e., jaundice)   Negative: Fever > 103 F (39.4 C)   Negative: [1] Fever > 101 F (38.3 C) AND [2] age > 60 years   Negative: [1] Fever > 100.0 F (37.8 C) AND [2] bedridden (e.g., CVA, chronic illness, recovering from surgery)   Negative: [1] Fever > 100.0 F (37.8 C) AND [2] diabetes mellitus or weak immune system (e.g., HIV positive, cancer chemo, splenectomy, organ transplant, chronic steroids)   Negative: [1] SEVERE pain AND [2] present < 1 hour   Negative: Pregnancy suspected (e.g., missed last menstrual period)   Negative: Blood in urine (red, pink, or tea-colored)   Negative: Age > 60 years    Protocols used: Abdominal Pain - Female-A-AH    "

## 2023-12-26 ENCOUNTER — TELEPHONE (OUTPATIENT)
Dept: SURGERY | Facility: CLINIC | Age: 38
End: 2023-12-26
Payer: COMMERCIAL

## 2024-02-15 NOTE — H&P
"PARTIAL HOSPITAL PROGRAM ADMISSION NOTE     PROGRAM START DATE: 02/11/2019.      IDENTIFICATION:  Ms. Montemayor is a 33-year-old   woman who lives in [place] with her  and 12-year-old son.  She has no psychiatrist but has a new therapist named Michelle Santos at [facility] in [place].  She was self-referred to the Partial Hospital Program after doing a Google search.  She is seeking treatment for anxiety and some depression.      HISTORY OF PRESENT ILLNESS:  Ms. Montemayor was staffed by Dr. Iqbal on 02/11/2019.  She says she has had anxiety since age 23.  She had an overly stressful job and mother had ongoing health problems.  She went to [facility] for 1 visit.  Medication was recommended by a provider there, but she did not take it and did not go back.  She saw a therapist 3 or 4 times around 03/2018 when mother was in hospice and work was stressful.  She met her new therapist at [facility] 01/21/2019 and has had just 1 visit so far.  She says her anxiety has been worsening, her resilience has decreased.  She took a leave of absence from work starting a week ago.  Anxiety is better just from not working over this past week, although anxiety has been her major problem over time.  She has had depression over the past couple of months.  She dreads going to work.  She gets quite anxious.  Her commute is long and she has time to worry.  Work has gotten worse.  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  She recently [facility] to the discharge pharmacy.  That helped some with anxiety but they have been too short-staffed.  Sleep is \"pretty decent.\"  She says she does not keep a regular sleep schedule.  She will often wake up at 2-3 in the morning.  Appetite is okay.  Weight is stable.  She is able to enjoy things.  Energy level is down.  She is mentally drained after work, is better now that she is on a " leave from work.  Energy is also down in the winter.  She has still been exercising.  Libido is okay.  Concentration was poor at work due to anxiety.  She was feeling hopeless before she took a leave of absence, but has more hope now.  She denies feeling helpless, worthless or guilty.  She denies crying spells.  She has had some passive suicidal thoughts for the past month or 2.  She has no plan or intention to kill herself.  She denies homicidal thoughts.  She denies psychotic symptoms.  She started having some panic attacks at age 24 or 25.  These are precipitated by work.  She has had 2 or 3 over the past 2 months' duration of 5-10 minutes.  She describes sudden onset of anxiety.  She will get lightheaded, short of breath, shaky, tachycardic, tremulous, hot or cold and has depersonalization.  She has had 4 or 5 of these at home out of the blue.  She has been quite somatic.  Whenever she has some bodily symptoms she fears the worst and Googles it.  She will think she has cancer.  She is hypervigilant and stressed at work.  She endorses chronic excessive worry with muscle tension, restlessness, keyed up feeling, poor concentration, fatigue and sleep disturbance.  She denies irritability.  She denies PTSD symptoms, obsessive-compulsive symptoms, memory problems, eating disorder or gambling.  Her only health concern is being out of shape.  Stressors include work, mother's health, and the anxiety itself.      PAST PSYCHIATRIC HISTORY:  Anxiety started at age 23.  She went to the [facility] for 1 visit and was prescribed a medication, possibly Celexa, which she did not take. In 03/2018, when mother was in hospice and work was stressful she saw a therapist 3 or 4 times, but did not connect and quit going.  [facility].  She has no psychiatrist.  She has never taken psychotropic medications.  She has never been admitted  to an inpatient psychiatric unit.  She has no history of suicide attempts.  She has never had an ECT.      CHEMICAL DEPENDENCY HISTORY:  Ms. Montemayor first tried alcohol at age 16.  She has 1-2 glasses of wine a month.  She says in her early 20s she had a couple blackouts.  She denies withdrawal symptoms, detox visits, DTs, seizures, DWIs or chemical dependency treatment.  She does not use drugs or tobacco.      PAST MEDICAL HISTORY:  Ms. Montemayor has no primary care physician or clinic.  She denies any medical illnesses, no history of seizures.  She says she may have had 2 concussions as a kid, once from sledding and once from bonking her head on a paper towel dispenser.      MEDICATIONS:  Multivitamin 1 daily.      ALLERGIES:  PENICILLIN.      FAMILY HISTORY:  Sister has depression and anxiety.  Maternal grandmother had anxiety.  Some maternal aunt had anxiety.  Father is alcoholic.  She denies a family history of suicide.      SOCIAL HISTORY:  Ms. Montemayor was born in [place], [place].  She was raised in [place], [place] by her father.  Mother has been in a nursing home since Ms. Montemayor was in 6th grade.  She has MS.  She has had significant physical and cognitive deterioration.  Mother sometimes does not know who Ms. Montemayor is.  Mother had been in hospice but is now back in palliative care.  She thinks that her parents are still .  She has 1 sister and no brothers.  Father worked construction.  She denied any physical, sexual or emotional abuse.  She graduated high school.  She is .  Her  is Moises.  They had a [place] wedding ceremony 13 years ago and were  legally in the [place] 5 years ago.  They have a 12-year-old son.  She lives with her  and son in [place].  She works as a pharmacy tech at [employer].   is  at a restaurant.  She denies any legal problems.  She was never in the .      MENTAL STATUS EXAMINATION:  Ms. Montemayor  is an adequately groomed 33-year-old  woman looking her stated age.  Gait and station are normal.  Psychomotor activity is within normal limits.  Speech is fluent and normal in rate.  Language is normal.  Mood is depressed and anxious.  Affect is sad and anxious.  Attention and concentration appear adequate.  Thought process is normal.  Associations are normal.  She denied any psychotic symptoms.  She endorsed some passive suicidal thoughts.  She has no plan or intention to kill herself.  She denied homicidal thoughts.  Fund of knowledge is adequate.  Remote and recent memory are adequate.  Insight and judgment appear adequate.  She was alert and oriented x3.  There was no evidence of movement disorder.      ASSESSMENT:  Ms. Rendon is a 33-year-old woman with a history anxiety going back 10 years when she was 23.  She has both generalized anxiety symptoms and panic attacks.  She believes anxiety has been her primary problem, although she has been depressed over the past couple of months.  Stressors include work and mother's health.  She is starting the Partial Hospital Program to build her resilience and improve her anxiety and depression.  She does not want psychotropic medication.      DIAGNOSES:   Axis I:  Generalized anxiety disorder.   Panic disorder, depressive disorder, not  otherwise specified.   Axis II:  No diagnosis.   Axis III:  No diagnosis.      PLAN:   1.  Begin [facility] Partial Hospital Program.   2.  Ms. Rendon plans to begin EMDR with a therapist in [place].  It is not clear that she will continue individual therapy at [facility] in [place].   3.  Ms. Rendon declines psychotropic medications at this time.  Will reevaluate throughout the course of her partial hospital stay.   4.  Expect stabilization and completion of Partial Hospital Program.         AILYN COLUNGA MD             D: 02/11/2019   T: 02/11/2019   MT: ALEX      Name:     SUZIE RENDON   MRN:       [id number]        Account:      [id number]   :      1985        Admitted:     2019                   Document: [id number]       All other review of systems negative, except as noted in HPI

## 2024-07-16 NOTE — CONFIDENTIAL NOTE
Attempted to call patient for post op check.  No answer.  Message was left for patient to call back if they had any questions of concerns.     Charleen Biggs PA-C    verbal cues/nonverbal cues (demo/gestures)/1 person assist

## 2024-08-21 SDOH — HEALTH STABILITY: PHYSICAL HEALTH: ON AVERAGE, HOW MANY DAYS PER WEEK DO YOU ENGAGE IN MODERATE TO STRENUOUS EXERCISE (LIKE A BRISK WALK)?: 5 DAYS

## 2024-08-21 SDOH — HEALTH STABILITY: PHYSICAL HEALTH: ON AVERAGE, HOW MANY MINUTES DO YOU ENGAGE IN EXERCISE AT THIS LEVEL?: 10 MIN

## 2024-08-21 ASSESSMENT — PATIENT HEALTH QUESTIONNAIRE - PHQ9
SUM OF ALL RESPONSES TO PHQ QUESTIONS 1-9: 3
10. IF YOU CHECKED OFF ANY PROBLEMS, HOW DIFFICULT HAVE THESE PROBLEMS MADE IT FOR YOU TO DO YOUR WORK, TAKE CARE OF THINGS AT HOME, OR GET ALONG WITH OTHER PEOPLE: NOT DIFFICULT AT ALL
SUM OF ALL RESPONSES TO PHQ QUESTIONS 1-9: 3

## 2024-08-21 ASSESSMENT — SOCIAL DETERMINANTS OF HEALTH (SDOH): HOW OFTEN DO YOU GET TOGETHER WITH FRIENDS OR RELATIVES?: THREE TIMES A WEEK

## 2024-08-22 ENCOUNTER — OFFICE VISIT (OUTPATIENT)
Dept: FAMILY MEDICINE | Facility: CLINIC | Age: 39
End: 2024-08-22
Payer: COMMERCIAL

## 2024-08-22 VITALS
HEIGHT: 64 IN | TEMPERATURE: 97.9 F | RESPIRATION RATE: 18 BRPM | SYSTOLIC BLOOD PRESSURE: 121 MMHG | DIASTOLIC BLOOD PRESSURE: 84 MMHG | WEIGHT: 197 LBS | BODY MASS INDEX: 33.63 KG/M2 | OXYGEN SATURATION: 98 % | HEART RATE: 78 BPM

## 2024-08-22 DIAGNOSIS — Z00.00 ROUTINE GENERAL MEDICAL EXAMINATION AT A HEALTH CARE FACILITY: Primary | ICD-10-CM

## 2024-08-22 DIAGNOSIS — R10.9 RECURRENT ABDOMINAL PAIN: ICD-10-CM

## 2024-08-22 DIAGNOSIS — Z13.6 CARDIOVASCULAR SCREENING; LDL GOAL LESS THAN 160: ICD-10-CM

## 2024-08-22 DIAGNOSIS — R63.5 ABNORMAL WEIGHT GAIN: ICD-10-CM

## 2024-08-22 DIAGNOSIS — F33.1 MAJOR DEPRESSIVE DISORDER, RECURRENT EPISODE, MODERATE WITH ANXIOUS DISTRESS (H): ICD-10-CM

## 2024-08-22 PROBLEM — F41.0 PANIC DISORDER WITHOUT AGORAPHOBIA: Status: RESOLVED | Noted: 2019-02-11 | Resolved: 2024-08-22

## 2024-08-22 LAB
ALBUMIN SERPL BCG-MCNC: 4.3 G/DL (ref 3.5–5.2)
ALP SERPL-CCNC: 66 U/L (ref 40–150)
ALT SERPL W P-5'-P-CCNC: 17 U/L (ref 0–50)
ANION GAP SERPL CALCULATED.3IONS-SCNC: 10 MMOL/L (ref 7–15)
AST SERPL W P-5'-P-CCNC: 17 U/L (ref 0–45)
BASOPHILS # BLD AUTO: 0 10E3/UL (ref 0–0.2)
BASOPHILS NFR BLD AUTO: 0 %
BILIRUB SERPL-MCNC: 0.4 MG/DL
BUN SERPL-MCNC: 11.6 MG/DL (ref 6–20)
CALCIUM SERPL-MCNC: 9.7 MG/DL (ref 8.8–10.4)
CHLORIDE SERPL-SCNC: 105 MMOL/L (ref 98–107)
CHOLEST SERPL-MCNC: 171 MG/DL
CREAT SERPL-MCNC: 0.75 MG/DL (ref 0.51–0.95)
CRP SERPL-MCNC: 10.9 MG/L
EGFRCR SERPLBLD CKD-EPI 2021: >90 ML/MIN/1.73M2
EOSINOPHIL # BLD AUTO: 0.1 10E3/UL (ref 0–0.7)
EOSINOPHIL NFR BLD AUTO: 2 %
ERYTHROCYTE [DISTWIDTH] IN BLOOD BY AUTOMATED COUNT: 13.5 % (ref 10–15)
ERYTHROCYTE [SEDIMENTATION RATE] IN BLOOD BY WESTERGREN METHOD: 24 MM/HR (ref 0–20)
FASTING STATUS PATIENT QL REPORTED: NO
FASTING STATUS PATIENT QL REPORTED: NO
GLUCOSE SERPL-MCNC: 90 MG/DL (ref 70–99)
HCO3 SERPL-SCNC: 24 MMOL/L (ref 22–29)
HCT VFR BLD AUTO: 39.1 % (ref 35–47)
HDLC SERPL-MCNC: 45 MG/DL
HGB BLD-MCNC: 12.3 G/DL (ref 11.7–15.7)
IMM GRANULOCYTES # BLD: 0 10E3/UL
IMM GRANULOCYTES NFR BLD: 0 %
LDLC SERPL CALC-MCNC: 112 MG/DL
LYMPHOCYTES # BLD AUTO: 1.7 10E3/UL (ref 0.8–5.3)
LYMPHOCYTES NFR BLD AUTO: 28 %
MCH RBC QN AUTO: 24.9 PG (ref 26.5–33)
MCHC RBC AUTO-ENTMCNC: 31.5 G/DL (ref 31.5–36.5)
MCV RBC AUTO: 79 FL (ref 78–100)
MONOCYTES # BLD AUTO: 0.4 10E3/UL (ref 0–1.3)
MONOCYTES NFR BLD AUTO: 7 %
NEUTROPHILS # BLD AUTO: 3.8 10E3/UL (ref 1.6–8.3)
NEUTROPHILS NFR BLD AUTO: 63 %
NONHDLC SERPL-MCNC: 126 MG/DL
PLATELET # BLD AUTO: 281 10E3/UL (ref 150–450)
POTASSIUM SERPL-SCNC: 4.3 MMOL/L (ref 3.4–5.3)
PROT SERPL-MCNC: 7.8 G/DL (ref 6.4–8.3)
RBC # BLD AUTO: 4.94 10E6/UL (ref 3.8–5.2)
SODIUM SERPL-SCNC: 139 MMOL/L (ref 135–145)
TRIGL SERPL-MCNC: 72 MG/DL
TSH SERPL DL<=0.005 MIU/L-ACNC: 1.64 UIU/ML (ref 0.3–4.2)
WBC # BLD AUTO: 6.1 10E3/UL (ref 4–11)

## 2024-08-22 PROCEDURE — 86140 C-REACTIVE PROTEIN: CPT | Performed by: FAMILY MEDICINE

## 2024-08-22 PROCEDURE — 80061 LIPID PANEL: CPT | Performed by: FAMILY MEDICINE

## 2024-08-22 PROCEDURE — 84443 ASSAY THYROID STIM HORMONE: CPT | Performed by: FAMILY MEDICINE

## 2024-08-22 PROCEDURE — 85652 RBC SED RATE AUTOMATED: CPT | Performed by: FAMILY MEDICINE

## 2024-08-22 PROCEDURE — 80053 COMPREHEN METABOLIC PANEL: CPT | Performed by: FAMILY MEDICINE

## 2024-08-22 PROCEDURE — 99395 PREV VISIT EST AGE 18-39: CPT | Performed by: FAMILY MEDICINE

## 2024-08-22 PROCEDURE — 99214 OFFICE O/P EST MOD 30 MIN: CPT | Mod: 25 | Performed by: FAMILY MEDICINE

## 2024-08-22 PROCEDURE — 85025 COMPLETE CBC W/AUTO DIFF WBC: CPT | Performed by: FAMILY MEDICINE

## 2024-08-22 PROCEDURE — 96127 BRIEF EMOTIONAL/BEHAV ASSMT: CPT | Performed by: FAMILY MEDICINE

## 2024-08-22 PROCEDURE — 36415 COLL VENOUS BLD VENIPUNCTURE: CPT | Performed by: FAMILY MEDICINE

## 2024-08-22 ASSESSMENT — PAIN SCALES - GENERAL: PAINLEVEL: NO PAIN (0)

## 2024-08-22 NOTE — PROGRESS NOTES
"Preventive Care Visit  [facility]  Marnie Rdz MD, Family Medicine  Aug 22, 2024      Assessment & Plan     (Z00.00) Routine general medical examination at a health care facility  (primary encounter diagnosis)      (R10.9) Recurrent abdominal pain  New, previously undiagnosed problem with uncertain prognosis and additional work-up planned.   Plan: US Abdomen Limited, Adult GI  Referral        - Consult Only, Comprehensive metabolic panel         (BMP + Alb, Alk Phos, ALT, AST, Total. Bili,         TP), CBC with platelets and differential, CRP         inflammation, Erythrocyte sedimentation rate         auto  Will fu as indicated.     (F33.1) Major depressive disorder, recurrent episode, moderate with anxious distress (H)  Comment:       11/17/2021     1:48 PM 8/18/2023     2:43 PM 8/21/2024     9:44 AM   PHQ   PHQ-9 Total Score 2 3 3   Q9: Thoughts of better off dead/self-harm past 2 weeks Not at all Not at all Not at all       Plan: At goal  The current medical regimen is effective;  continue present plan and medications.      (Z13.6) CARDIOVASCULAR SCREENING; LDL GOAL LESS THAN 160  Comment:   LDL Cholesterol Calculated   Date Value Ref Range Status   09/15/2023 118 (H) <=100 mg/dL Final      Plan: Lipid panel reflex to direct LDL Fasting        At goal    (R63.5) Abnormal weight gain  Encouraged regular exercise, will continue to monitor.    Patient has been advised of split billing requirements and indicates understanding: Yes        BMI  Estimated body mass index is 34.05 kg/m  as calculated from the following:    Height as of this encounter: 1.62 m (5' 3.78\").    Weight as of this encounter: 89.4 kg (197 lb).   Weight management plan: Discussed healthy diet and exercise guidelines    Counseling  Appropriate preventive services were addressed with this patient via screening, questionnaire, or discussion as appropriate for fall prevention, nutrition, physical " "activity, Tobacco-use cessation, social engagement, weight loss and cognition.  Checklist reviewing preventive services available has been given to the patient.  Reviewed patient's diet, addressing concerns and/or questions.     Subjective   Alexi is a 39 year old, presenting for the following:  Physical (Weight management/Lab recheck - inflammation./GI concerns - states ulcer feelings, for a few years, gallbladder removed and can feel it in that area too. )    Intermittent abdominal pain  Alexi is a 38 yo with a history of cholecystectomy Nov 23, with ongoing intermittent \"sore\" feeling of abdomen, usually in the LEFT UPPER QUADRANT or right upper quadrant, described as brief burning sensations not associated with GERD or chest pain.  She does have intermittent constipation. Symptoms improved when she cut back on ibuprofen.  She's been previously checked for H pylori, reports that this was negative.    Weight  She reports weight gain since cholecystectomy, may be due to stress/grief, has been working on getting her father's estate settled since his recent death, and she has a 2 year old, and works full time.  She reports generally eating a healthy diet, although does have a sweet tooth. She doesn't get regular exercise. She reports more of an abdomen since she was pregnant, abdomen hasn't gotten smaller.    Recent Labs   Lab Test 09/15/23  0909 09/02/23  1402   CHOL 172 192   HDL 38* 41*   * 118*   TRIG 80 164*     Wt Readings from Last 4 Encounters:   08/22/24 89.4 kg (197 lb)   11/30/23 82.9 kg (182 lb 12.8 oz)   11/27/23 84.1 kg (185 lb 4.8 oz)   10/30/23 85.7 kg (189 lb)         8/22/2024     8:25 AM   Additional Questions   Roomed by Alana DAVIS        Health Care Directive  Patient has a Health Care Directive on file  Advance care planning document is on file and is current.    HPI      Depression   How are you doing with your depression since your last visit? No change  Are you having other symptoms that " might be associated with depression? Yes:  sometimes poor sleep  Have you had a significant life event?  Grief or Loss   Are you feeling anxious or having panic attacks?   Yes:  she is working with therapist  Do you have any concerns with your use of alcohol or other drugs? No    Social History     Tobacco Use    Smoking status: Never     Passive exposure: Never    Smokeless tobacco: Never   Vaping Use    Vaping status: Never Used   Substance Use Topics    Alcohol use: Not Currently     Comment: Rarely    Drug use: No         11/17/2021     1:48 PM 8/18/2023     2:43 PM 8/21/2024     9:44 AM   PHQ   PHQ-9 Total Score 2 3 3   Q9: Thoughts of better off dead/self-harm past 2 weeks Not at all Not at all Not at all         2/21/2019     4:03 PM 12/15/2020     2:16 PM 8/18/2023     2:44 PM   AYDIN-7 SCORE   Total Score  0 (minimal anxiety) 1 (minimal anxiety)   Total Score 13 0 1       Suicide Assessment Five-step Evaluation and Treatment (SAFE-T)        8/21/2024   General Health   How would you rate your overall physical health? Good   Feel stress (tense, anxious, or unable to sleep) Not at all            8/21/2024   Nutrition   Three or more servings of calcium each day? Yes   Diet: Regular (no restrictions)   How many servings of fruit and vegetables per day? (!) 2-3   How many sweetened beverages each day? 0-1            8/21/2024   Exercise   Days per week of moderate/strenous exercise 5 days   Average minutes spent exercising at this level 10 min            8/21/2024   Social Factors   Frequency of gathering with friends or relatives Three times a week   Worry food won't last until get money to buy more No   Food not last or not have enough money for food? No   Do you have housing? (Housing is defined as stable permanent housing and does not include staying ouside in a car, in a tent, in an abandoned building, in an overnight shelter, or couch-surfing.) Yes   Are you worried about losing your housing? No   Lack of  transportation? No   Unable to get utilities (heat,electricity)? No            2024   Dental   Dentist two times every year? Yes            8/15/2024   TB Screening   Were you born outside of the [place]? No          Today's PHQ-9 Score:       2024     9:44 AM   PHQ-9 SCORE   PHQ-9 Total Score MyChart 3 (Minimal depression)   PHQ-9 Total Score 3         2024   Substance Use   Alcohol more than 3/day or more than 7/wk No   Do you use any other substances recreationally? No        Social History     Tobacco Use    Smoking status: Never     Passive exposure: Never    Smokeless tobacco: Never   Vaping Use    Vaping status: Never Used   Substance Use Topics    Alcohol use: Not Currently     Comment: Rarely    Drug use: No          Mammogram Screening - Patient under 40 years of age: Routine Mammogram Screening not recommended.         2024   STI Screening   New sexual partner(s) since last STI/HIV test? No        History of abnormal Pap smear: No - age 30- 64 PAP with HPV every 5 years recommended        Latest Ref Rng & Units 12/15/2020     4:44 PM 12/15/2020     3:12 PM   PAP / HPV   PAP (Historical)   NIL    HPV 16 DNA NEG^Negative Negative     HPV 18 DNA NEG^Negative Negative     Other HR HPV NEG^Negative Negative             2024   Contraception/Family Planning   Questions about contraception or family planning No           Reviewed and updated as needed this visit by Provider   Tobacco     Med Hx  Surg Hx  Fam Hx            Past Medical History:   Diagnosis Date    Anxiety     Depression      Past Surgical History:   Procedure Laterality Date    EXTRACTION(S) DENTAL      LAPAROSCOPIC CHOLECYSTECTOMY N/A 2023    Procedure: CHOLECYSTECTOMY, LAPAROSCOPIC;  Surgeon: Parish Dawkins MD;  Location: [facility] OR    Skin mole removal       OB History    Para Term  AB Living   2 2 2 0 0 2   SAB IAB Ectopic Multiple Live Births   0 0 0 0 2      # Outcome Date GA Lbr Ronnie/2nd Weight  "Sex Type Anes PTL Lv   2 Term 10/08/21 39w1d 17:04 / 00:36 3.31 kg (7 lb 4.8 oz) M Vag-Spont Nitrous, Local N LUIS FERNANDO      Complications: Meconium staining      Name: NESSA RENDON-SUZIE      Apgar1: 7  Apgar5: 8   1 Term 07/21/06 40w0d  3.175 kg (7 lb) M Vag-Spont None  LUIS FERNANDO      Name: Edd     Review of Systems  Constitutional, HEENT, cardiovascular, pulmonary, gi and gu systems are negative, except as otherwise noted.     Objective    Exam  /84   Pulse 78   Temp 97.9  F (36.6  C) (Temporal)   Resp 18   Ht 1.62 m (5' 3.78\")   Wt 89.4 kg (197 lb)   LMP 07/01/2024 (Approximate)   SpO2 98%   BMI 34.05 kg/m     Estimated body mass index is 34.05 kg/m  as calculated from the following:    Height as of this encounter: 1.62 m (5' 3.78\").    Weight as of this encounter: 89.4 kg (197 lb).    Physical Exam  GENERAL: alert and no distress  EYES: Eyes grossly normal to inspection, PERRL and conjunctivae and sclerae normal  HENT: ear canals and TM's normal, nose and mouth without ulcers or lesions  NECK: no adenopathy, no asymmetry, masses, or scars  RESP: lungs clear to auscultation - no rales, rhonchi or wheezes  BREAST: normal without masses, tenderness or nipple discharge and no palpable axillary masses or adenopathy  CV: regular rate and rhythm, normal S1 S2, no S3 or S4, no murmur, click or rub, no peripheral edema  ABDOMEN: soft, nontender, no hepatosplenomegaly, no masses and bowel sounds normal  MS: no gross musculoskeletal defects noted, no edema  SKIN: no suspicious lesions or rashes  NEURO: Normal strength and tone, mentation intact and speech normal  PSYCH: mentation appears normal, affect normal/bright        Signed Electronically by: Marnie Rdz MD    Answers submitted by the patient for this visit:  Patient Health Questionnaire (Submitted on 8/21/2024)  If you checked off any problems, how difficult have these problems made it for you to do your work, take care of things at home, or get along " with other people?: Not difficult at all  PHQ9 TOTAL SCORE: 3

## 2024-08-22 NOTE — PATIENT INSTRUCTIONS
Patient Education   Preventive Care Advice   This is general advice given by our system to help you stay healthy. However, your care team may have specific advice just for you. Please talk to your care team about your preventive care needs.  Nutrition  Eat 5 or more servings of fruits and vegetables each day.  Try wheat bread, brown rice and whole grain pasta (instead of white bread, rice, and pasta).  Get enough calcium and vitamin D. Check the label on foods and aim for 100% of the RDA (recommended daily allowance).  Lifestyle  Exercise at least 150 minutes each week  (30 minutes a day, 5 days a week).  Do muscle strengthening activities 2 days a week. These help control your weight and prevent disease.  No smoking.  Wear sunscreen to prevent skin cancer.  Have a dental exam and cleaning every 6 months.  Yearly exams  See your health care team every year to talk about:  Any changes in your health.  Any medicines your care team has prescribed.  Preventive care, family planning, and ways to prevent chronic diseases.  Shots (vaccines)   HPV shots (up to age 26), if you've never had them before.  Hepatitis B shots (up to age 59), if you've never had them before.  COVID-19 shot: Get this shot when it's due.  Flu shot: Get a flu shot every year.  Tetanus shot: Get a tetanus shot every 10 years.  Pneumococcal, hepatitis A, and RSV shots: Ask your care team if you need these based on your risk.  Shingles shot (for age 50 and up)  General health tests  Diabetes screening:  Starting at age 35, Get screened for diabetes at least every 3 years.  If you are younger than age 35, ask your care team if you should be screened for diabetes.  Cholesterol test: At age 39, start having a cholesterol test every 5 years, or more often if advised.  Bone density scan (DEXA): At age 50, ask your care team if you should have this scan for osteoporosis (brittle bones).  Hepatitis C: Get tested at least once in your life.  STIs (sexually  transmitted infections)  Before age 24: Ask your care team if you should be screened for STIs.  After age 24: Get screened for STIs if you're at risk. You are at risk for STIs (including HIV) if:  You are sexually active with more than one person.  You don't use condoms every time.  You or a partner was diagnosed with a sexually transmitted infection.  If you are at risk for HIV, ask about PrEP medicine to prevent HIV.  Get tested for HIV at least once in your life, whether you are at risk for HIV or not.  Cancer screening tests  Cervical cancer screening: If you have a cervix, begin getting regular cervical cancer screening tests starting at age 21.  Breast cancer scan (mammogram): If you've ever had breasts, begin having regular mammograms starting at age 40. This is a scan to check for breast cancer.  Colon cancer screening: It is important to start screening for colon cancer at age 45.  Have a colonoscopy test every 10 years (or more often if you're at risk) Or, ask your provider about stool tests like a FIT test every year or Cologuard test every 3 years.  To learn more about your testing options, visit:   .  For help making a decision, visit:   https://bit.ly/pb63573.  Prostate cancer screening test: If you have a prostate, ask your care team if a prostate cancer screening test (PSA) at age 55 is right for you.  Lung cancer screening: If you are a current or former smoker ages 50 to 80, ask your care team if ongoing lung cancer screenings are right for you.  For informational purposes only. Not to replace the advice of your health care provider. Copyright   2023 [employer]. All rights reserved. Clinically reviewed by the [facility] Transitions Program. [employer] 525883 - REV 01/24.

## 2024-08-30 NOTE — RESULT ENCOUNTER NOTE
Thank you very much for getting labs done!     Your cholesterol is at goal, great job!  The testing of your blood sugar, kidney function, liver function and electrolytes was normal.   Your cbc, or complete blood count, which measures red blood cells (to check for anemia and other vitamin deficiencies) and white blood cells (to check for infection and leukemia) is normal, which is great!  Your platelets, which reflect liver function and ability to clot, are normal as well.   Your thyroid function is normal, which is good news.  This means that you do not have hyperthyroidism or hypothyroidism.       Your markers of inflammation are still slightly elevated, but this isn't a very specific test so it's hard to tell if this is still related to healing from the gall bladder surgery or something else. I hope we can find out more after the ultrasound is done!    If you have any questions, please contact the clinic or schedule an appointment with me, thank you!    Sincerely,  Dr. Marnie Rdz MD  8/30/2024

## 2024-09-11 ENCOUNTER — TELEPHONE (OUTPATIENT)
Dept: GASTROENTEROLOGY | Facility: CLINIC | Age: 39
End: 2024-09-11
Payer: COMMERCIAL

## 2024-09-11 NOTE — TELEPHONE ENCOUNTER
[facility] Call Center    Phone Message    May a detailed message be left on voicemail: Yes    Reason for Call: Other: Patient is currently scheduled on 10-8-24, as visit type New GI Urgent. This is outside the expected timeline for this referral. Patient has been added to the waitlist.      Action Taken: Message routed to:  Other: GI REFERRAL TRIAGE POOL     Travel Screening: Not Applicable

## 2024-09-12 ENCOUNTER — HOSPITAL ENCOUNTER (OUTPATIENT)
Dept: ULTRASOUND IMAGING | Facility: CLINIC | Age: 39
Discharge: HOME | End: 2024-09-12
Attending: FAMILY MEDICINE | Admitting: FAMILY MEDICINE
Payer: COMMERCIAL

## 2024-09-12 DIAGNOSIS — R10.9 RECURRENT ABDOMINAL PAIN: ICD-10-CM

## 2024-09-12 PROCEDURE — 76705 ECHO EXAM OF ABDOMEN: CPT

## 2024-09-12 NOTE — TELEPHONE ENCOUNTER
REFERRAL INFORMATION:  Referring Provider:  Marnie Rdz MD   Referring Clinic:  [facility]   Reason for Visit/Diagnosis: Recurrent abdominal pain      FUTURE VISIT INFORMATION:  Appointment Date: 10/8/2024  Appointment Time: 2:45PM      NOTES STATUS DETAILS   OFFICE NOTE from Referring Provider Internal   Marnie Rdz MD      OFFICE NOTE from Other Specialist Internal         PERTINENT LABS Internal    PATHOLOGY REPORTS (RELATED) Internal Surgical Biopsy   11/30/2023   A(1). Gallbladder, cholecystectomy:  -Chronic cholecystitis, and cholelithiasis.  -Negative for dysplasia or malignancy.   IMAGING (CT, MRI, EGD, MRCP, Small Bowel Follow Through/SBT, MR/CT Enterography) Internal US abdomen Limited 11/23/2023, 10/17/2023

## 2024-09-16 NOTE — TELEPHONE ENCOUNTER
The Pt is scheduled within the appropriate time frame of one month for urgent triaged referrals. No rescheduling is needed. Closing encounter.

## 2024-10-04 PROBLEM — K76.0 HEPATIC STEATOSIS: Status: ACTIVE | Noted: 2024-10-04

## 2024-10-04 NOTE — RESULT ENCOUNTER NOTE
Thank you for getting the ultrasound done! Everything looks pretty good except that you have a mildly fatty liver which is a little hard to understand because your cholesterol levels have always been good and you don't drink much alcohol.   You have been tested for Hepatitis B and C, and you're negative.    You don't have diabetes or hypertension which are other risk factors. I suspect that there's a genetic component, some people accumulate weight in their midsection (which includes your abdominal internal organs) rather than their hips.  There really isn't anything specific that you need to worry about at this point, certainly a healthy lifestyle with regular exercise and eating lots of vegetables and whole grains and few processed carbohydrates is important but you're already doing this.    I recommend rechecking an ultrasound in 5 years or so. If you have a lot of concerns and want to see a specialist, you're certainly welcome to do this. Just let me know and I'll refer you to a GI specialist.    Sincerely,  Dr. Marnie Rdz MD  10/4/2024

## 2024-10-08 ENCOUNTER — VIRTUAL VISIT (OUTPATIENT)
Dept: GASTROENTEROLOGY | Facility: CLINIC | Age: 39
End: 2024-10-08
Attending: FAMILY MEDICINE
Payer: COMMERCIAL

## 2024-10-08 ENCOUNTER — PRE VISIT (OUTPATIENT)
Dept: GASTROENTEROLOGY | Facility: CLINIC | Age: 39
End: 2024-10-08

## 2024-10-08 DIAGNOSIS — K59.00 CONSTIPATION, UNSPECIFIED CONSTIPATION TYPE: ICD-10-CM

## 2024-10-08 DIAGNOSIS — R10.9 RECURRENT ABDOMINAL PAIN: Primary | ICD-10-CM

## 2024-10-08 PROCEDURE — 99204 OFFICE O/P NEW MOD 45 MIN: CPT | Mod: 95 | Performed by: PHYSICIAN ASSISTANT

## 2024-10-08 NOTE — PROGRESS NOTES
Virtual Visit Details    Type of service:  Video Visit   Joined the call at 10/8/2024, 3:06:51 pm.  Left the call at 10/8/2024, 3:33:19 pm.  You were on the call for 26 minutes 28 seconds .    Originating Location (pt. Location): Home    Distant Location (provider location):  Off-site  Platform used for Video Visit: [facility]        GI CLINIC VISIT    CC/REFERRING MD:  Marnie Rdz  REASON FOR CONSULTATION: R10.9 (ICD-10-CM) - Recurrent abdominal pain     ASSESSMENT/PLAN:  Alexi Montemayor is a 39 year old year old female with PMHx significant for MDD/anxiety who presents seeing the  [facility] team for intermittent RUQ > LUQ abdominal pain x 10 years w/o alarm sx. It can worsen with NSAID use and ingestion of spicy foods. It has improved and resolved with short courses of PPI. She does run constipated, can go days without stooling.     External labs include normal CBC, CMP, TSH. Celiac serologies were NEG a year ago. No total IgA. H.pylori stool antigen was NEG 11 mo ago (reportedly off PPI).     11/30/2023 laparoscopic cholecystectomy for symptomatic cholelithiasis    Fhx is non-contributoary.     Never had a colonoscopy    #Abd pain x 10 years   #Stooling   Intermittent and fleeting, RUQ>LUQ. Hx is suggestive of gastritis with ddx to include hypervisceral sensitivity/functional disease and retained stools contributing to pain picture. Atypical biliary etiology is thought less likely given s/p CCY status and normal hepatic function panel.     -bowel regiment, per AVS  -if pain were to recur again, can consider short PPI course per AVS  -Okay to take as needed Pepcid per AVS  -add on total IgA  -S/Sx of gastric ulcer provided today  -No indication for endoscopy today    Future consideration  Bowel cleanout  PPI trial plus minus scheduled Pepcid  Proceed with EGD for development of alarm symptoms    No orders of the defined types were placed in this encounter.    Colorectal cancer screening: Aged 45 unless  otherwise clinically indicated    RTC 8 week or sooner PRN     Thank you for this consultation.  It was a pleasure to participate in the care of this patient; please contact me with any further questions.     Jessica Contreras PA-C    Follow up: As planned above. Today, I personally spent 26 minutes in direct face to face time with the patient, of which greater than 50% of the time was spent in patient education and counseling as described above. Approximately 19 minutes were spent on indirect care associated with the patient's consultation including but not limited to review of: patient medical records to date, clinic visits, hospital records, lab results, imaging studies, procedural documentation, and coordinating care with other providers. The findings from this review are summarized in the above note. All of the above accounted for a cumulative time of 45 minutes and was performed on the date of service.     HPI  Alexi Montemayor is a 39 year old year old female with PMHx of MDD/anxiety following with the [facility] GI group for abdominal pain.     1. Intermittent abd pain x 10 years. RUQ>LUQ  -fleeting sensation for 5-10 sec then resolves, burning or gnawing sensation . Overall stable w/o progression.   -typically notices it 1-2 x every few weeks. Can have a few episodes that same day. No episodes of pain this week.   -NSAID use and spicy foods tend to trigger this pain  -improves and resolves w/ short course PPI   -had bad abd pain shortly after CCY so took prevacid BID x 1 week with resolution of this pain  -does have occasional heartburn but denies dysphagia, odynphagia.     H.pylori was NEG 11 mo ago (reportedly off PPI)    BM - can have upwards to 3-4d w/o stooling. Can be harder big pellets or a large firm stool. No bloody or black stools. Does strain. Good evacuation. 5 min to stool.   -not on bowel regimen currently     ROS  Denies fevers, chills, weight loss, nausea, emesis, dysphagia, odynophagia , appetite change,  unintentional weight loss, regurgitation, black tarrying stools, bloody stools.     Family Hx  Colon polyps in family, no other specific details known (size of polyp, type of polyp, etc). She denies being told she needed a sooner colonoscopy.      No other known family history or GI related malignancy (esophageal, gastric, pancreatic, liver or colon) or family history of IBD/celiac disease.     Social Hx   No use of NSAIDs now d/t pain   Will take PRN Tylenol     Rare ETOH  Never tobacco products   No recreational drug use     PROBLEM LIST  Patient Active Problem List    Diagnosis Date Noted    Gallstones 11/27/2023     Priority: Medium    Major depressive disorder, recurrent episode, moderate with anxious distress (H) 02/11/2019     Priority: Medium    Anxiety 06/21/2017     Priority: Medium    Hepatic steatosis 10/04/2024     Priority: Low     Class: Minor       PERTINENT PAST MEDICAL HISTORY:  Past Medical History:   Diagnosis Date    Anxiety     Depression        PREVIOUS SURGERIES:  Past Surgical History:   Procedure Laterality Date    EXTRACTION(S) DENTAL      LAPAROSCOPIC CHOLECYSTECTOMY N/A 11/30/2023    Procedure: CHOLECYSTECTOMY, LAPAROSCOPIC;  Surgeon: Parish Dawkins MD;  Location: [facility] OR    Skin mole removal         ALLERGIES:     Allergies   Allergen Reactions    Pcn [Penicillins] Hives    Sulfa Antibiotics Hives       PERTINENT MEDICATIONS:    Current Outpatient Medications:     citalopram (CELEXA) 20 MG tablet, Take 2 tablets (40 mg) by mouth daily, Disp: , Rfl: 1    multivitamin w/minerals (THERA-VIT-M) tablet, Take 1 tablet by mouth daily, Disp: , Rfl:     SOCIAL HISTORY:  Social History     Socioeconomic History    Marital status:      Spouse name: Miguel Monzon    Number of children: 2    Years of education: Not on file    Highest education level: High school graduate   Occupational History    Occupation: pharmacy tech   Tobacco Use    Smoking status: Never     Passive exposure: Never     Smokeless tobacco: Never   Vaping Use    Vaping status: Never Used   Substance and Sexual Activity    Alcohol use: Not Currently     Comment: Rarely    Drug use: No    Sexual activity: Yes     Partners: Male     Birth control/protection: Condom   Other Topics Concern    Parent/sibling w/ CABG, MI or angioplasty before 65F 55M? No   Social History Narrative    Not on file     Social Determinants of Health     Financial Resource Strain: Low Risk  (8/21/2024)    Financial Resource Strain     Within the past 12 months, have you or your family members you live with been unable to get utilities (heat, electricity) when it was really needed?: No   Food Insecurity: Low Risk  (8/21/2024)    Food Insecurity     Within the past 12 months, did you worry that your food would run out before you got money to buy more?: No     Within the past 12 months, did the food you bought just not last and you didn t have money to get more?: No   Transportation Needs: Low Risk  (8/21/2024)    Transportation Needs     Within the past 12 months, has lack of transportation kept you from medical appointments, getting your medicines, non-medical meetings or appointments, work, or from getting things that you need?: No   Physical Activity: Insufficiently Active (8/21/2024)    Exercise Vital Sign     Days of Exercise per Week: 5 days     Minutes of Exercise per Session: 10 min   Stress: No Stress Concern Present (8/21/2024)    Citizen of [place] of Occupational Health - Occupational Stress Questionnaire     Feeling of Stress : Not at all   Social Connections: Moderately Integrated (8/21/2024)    Social Connection and Isolation Panel [NHANES]     Frequency of Communication with Friends and Family: Three times a week     Frequency of Social Gatherings with Friends and Family: Three times a week     Attends Voodoo Services: 1 to 4 times per year     Active Member of Clubs or Organizations: No     Attends Club or Organization Meetings: Patient declined      Marital Status:    Interpersonal Safety: Low Risk  (8/22/2024)    Interpersonal Safety     Do you feel physically and emotionally safe where you currently live?: Yes     Within the past 12 months, have you been hit, slapped, kicked or otherwise physically hurt by someone?: No     Within the past 12 months, have you been humiliated or emotionally abused in other ways by your partner or ex-partner?: No   Housing Stability: Low Risk  (8/21/2024)    Housing Stability     Do you have housing? : Yes     Are you worried about losing your housing?: No       FAMILY HISTORY:  Family History   Problem Relation Age of Onset    Cervical Cancer Mother     Multiple Sclerosis Mother     Vaginal Cancer Mother     Anxiety Disorder Mother     Diabetes Father     Hypertension Father     Alcoholism Father     Substance Abuse Father         alcoholic    Obesity Father     Depression Sister     Anxiety Disorder Sister     Obesity Sister     Other - See Comments Sister         MOG- like MS    Diabetes Type 2  Maternal Grandmother     Hypertension Maternal Grandmother     Schizophrenia Maternal Aunt     Rheumatoid Arthritis Maternal Uncle     Mental Illness Cousin     Glaucoma No family hx of     Macular Degeneration No family hx of     Colon Cancer No family hx of     Breast Cancer No family hx of        Past/family/social history reviewed and no changes    PHYSICAL EXAMINATION:  Vitals reviewed: LMP 07/01/2024 (Approximate)   Video physical exam  General: Patient appears well in no acute distress.   Skin: No visualized rash or lesions on visualized skin  Eyes: EOMI, no erythema, sclera icterus or discharge noted  Resp: Appears to be breathing comfortably without accessory muscle usage, speaking in full sentences, no cough  MSK: Appears to have normal range of motion based on visualized movements  Neurologic: No apparent tremors, facial movements symmetric  Psych: affect normal, alert and oriented    PERTINENT STUDIES:    Office  Visit on 08/22/2024   Component Date Value Ref Range Status    Sodium 08/22/2024 139  135 - 145 mmol/L Final    Potassium 08/22/2024 4.3  3.4 - 5.3 mmol/L Final    Carbon Dioxide (CO2) 08/22/2024 24  22 - 29 mmol/L Final    Anion Gap 08/22/2024 10  7 - 15 mmol/L Final    Urea Nitrogen 08/22/2024 11.6  6.0 - 20.0 mg/dL Final    Creatinine 08/22/2024 0.75  0.51 - 0.95 mg/dL Final    GFR Estimate 08/22/2024 >90  >60 mL/min/1.73m2 Final    Calcium 08/22/2024 9.7  8.8 - 10.4 mg/dL Final    Chloride 08/22/2024 105  98 - 107 mmol/L Final    Glucose 08/22/2024 90  70 - 99 mg/dL Final    Alkaline Phosphatase 08/22/2024 66  40 - 150 U/L Final    AST 08/22/2024 17  0 - 45 U/L Final    ALT 08/22/2024 17  0 - 50 U/L Final    Protein Total 08/22/2024 7.8  6.4 - 8.3 g/dL Final    Albumin 08/22/2024 4.3  3.5 - 5.2 g/dL Final    Bilirubin Total 08/22/2024 0.4  <=1.2 mg/dL Final    Patient Fasting > 8hrs? 08/22/2024 No   Final    CRP Inflammation 08/22/2024 10.90 (H)  <5.00 mg/L Final    Erythrocyte Sedimentation Rate 08/22/2024 24 (H)  0 - 20 mm/hr Final    Cholesterol 08/22/2024 171  <200 mg/dL Final    Triglycerides 08/22/2024 72  <150 mg/dL Final    Direct Measure HDL 08/22/2024 45 (L)  >=50 mg/dL Final    LDL Cholesterol Calculated 08/22/2024 112 (H)  <=100 mg/dL Final    Non HDL Cholesterol 08/22/2024 126  <130 mg/dL Final    Patient Fasting > 8hrs? 08/22/2024 No   Final    TSH 08/22/2024 1.64  0.30 - 4.20 uIU/mL Final    WBC Count 08/22/2024 6.1  4.0 - 11.0 10e3/uL Final    RBC Count 08/22/2024 4.94  3.80 - 5.20 10e6/uL Final    Hemoglobin 08/22/2024 12.3  11.7 - 15.7 g/dL Final    Hematocrit 08/22/2024 39.1  35.0 - 47.0 % Final    MCV 08/22/2024 79  78 - 100 fL Final    MCH 08/22/2024 24.9 (L)  26.5 - 33.0 pg Final    MCHC 08/22/2024 31.5  31.5 - 36.5 g/dL Final    RDW 08/22/2024 13.5  10.0 - 15.0 % Final    Platelet Count 08/22/2024 281  150 - 450 10e3/uL Final    % Neutrophils 08/22/2024 63  % Final    % Lymphocytes  08/22/2024 28  % Final    % Monocytes 08/22/2024 7  % Final    % Eosinophils 08/22/2024 2  % Final    % Basophils 08/22/2024 0  % Final    % Immature Granulocytes 08/22/2024 0  % Final    Absolute Neutrophils 08/22/2024 3.8  1.6 - 8.3 10e3/uL Final    Absolute Lymphocytes 08/22/2024 1.7  0.8 - 5.3 10e3/uL Final    Absolute Monocytes 08/22/2024 0.4  0.0 - 1.3 10e3/uL Final    Absolute Eosinophils 08/22/2024 0.1  0.0 - 0.7 10e3/uL Final    Absolute Basophils 08/22/2024 0.0  0.0 - 0.2 10e3/uL Final    Absolute Immature Granulocytes 08/22/2024 0.0  <=0.4 10e3/uL Final        Lab Results   Component Value Date    WBC 6.1 08/22/2024    WBC 7.4 11/23/2023    WBC 7.0 10/17/2023    HGB 12.3 08/22/2024    HGB 12.6 11/23/2023    HGB 11.8 10/17/2023     08/22/2024     11/23/2023     10/17/2023    CHOL 171 08/22/2024    CHOL 172 09/15/2023    CHOL 192 09/02/2023    TRIG 72 08/22/2024    TRIG 80 09/15/2023    TRIG 164 (H) 09/02/2023    HDL 45 (L) 08/22/2024    HDL 38 (L) 09/15/2023    HDL 41 (L) 09/02/2023    ALT 17 08/22/2024    ALT 10 11/23/2023    ALT 13 10/17/2023    AST 17 08/22/2024    AST 12 11/23/2023    AST 15 10/17/2023     08/22/2024     11/23/2023     10/17/2023    BUN 11.6 08/22/2024    BUN 12.7 11/23/2023    BUN 9.5 10/17/2023    CO2 24 08/22/2024    CO2 27 11/23/2023    CO2 25 10/17/2023    TSH 1.64 08/22/2024    TSH 2.08 08/18/2023    TSH 1.28 12/15/2020        Liver Function Studies -   Recent Labs   Lab Test 08/22/24  0939   PROTTOTAL 7.8   ALBUMIN 4.3   BILITOTAL 0.4   ALKPHOS 66   AST 17   ALT 17        PREVIOUS ENDOSCOPY    No results found for this or any previous visit.

## 2024-10-08 NOTE — PATIENT INSTRUCTIONS
It was a pleasure taking care of you today.  I've included a brief summary of our discussion and care plan from today's visit below.  Please review this information with your primary care provider.  _______________________________________________________________________    My recommendations are summarized as follows:    One non-urgent lab ordered. Please call below lab number to get this scheduled.  Once I review the results, I will be in touch on MyChart  Lets work on the bowel regiment  - start daily miralax 1 cap for 2 weeks. After that 2 week period, start citrucel powder 1 tsp daily x 2 weeks. Increase by a tsp weekly for goal of 1 to 2 tablespoons in a day  -- Both of these are found over-the-counter  If you are to have recurrent episodes of this pain, it would be okay to take Prevacid 15 mg twice a day for 4 to 8 weeks.  Take on an empty stomach about 30 to 60 minutes before breakfast and dinner  For the heartburn, it is okay for you to take famotidine or Pepcid 20 mg twice a day as needed  See below for general GERD friendly diet and lifestyle recommendations    Please call our clinic or send a [employer] message to us if you have any questions or concerns. [employer] messages are answered by your nurse or doctor typically within 24 hours.  Please allow extra time on weekends and holidays    Return to GI Clinic in 2 months to review your progress.    _______________________________________________________________________    How do I schedule labs, imaging studies, or procedures that were ordered in clinic today?      Labs: To schedule lab appointment at the [facility], use my chart or call [phone]. If you have a [place] lab closer to home where you are regularly seen you can give them a call.      Procedures: If a colonoscopy, upper endoscopy, breath test, esophageal manometry, or pH impedence was ordered today, our endoscopy team will call you to schedule this. If you have not heard from our  endoscopy team within a week, please call [phone] option 2 to schedule.      Imaging Studies: If you were scheduled for a CT scan, X-ray, MRI, ultrasound, HIDA scan, EKG or other imaging study, please call [phone] to have this scheduled.      Referral: If a referral to another specialty was ordered, expect a phone call or follow instructions above. If you have not heard from anyone regarding your referral in a week, please call our clinic to check the status.      Who do I call with any questions after my visit?  Please be in touch if there are any further questions that arise following today's visit.  There are multiple ways to contact your gastroenterology care team.       During business hours, you may reach a Gastroenterology nurse at [phone]     To schedule or reschedule an appointment, please call [phone].      You can always send a secure message through [employer].  [employer] messages are answered by your nurse or doctor typically within 24 hours.  Please allow extra time on weekends and holidays.       For urgent/emergent questions after business hours, you may reach the on-call GI Fellow by contacting the [facility]  at [phone].     How will I get the results of any tests ordered?    You will receive all of your results.  If you have signed up for [employer], any tests ordered at your visit will be available to you after your physician reviews them.  Typically this takes 1-2 weeks.  If there are urgent results that require a change in your care plan, your physician or nurse will call you to discuss the next steps.       What is [employer]?  [employer] is a secure way for you to access all of your healthcare records from the [facility].  It is a web based computer program, so you can sign on to it from any location.  It also allows you to send secure messages to your care team.  I recommend signing up for [employer] access if you have not already done so and are  comfortable with using a computer.       How to I schedule a follow-up visit?  If you did not schedule a follow-up visit today, please call [phone] to schedule a follow-up office visit.      Sincerely,    Jessica Contreras PA-C  Gastroenterology    --  Lifestyle modifications for gastroesophageal reflux disease (GERD).     1. Change your eating habits.  -- It's best to eat several small meals instead of two or three large meals.  -- After you eat, wait 2 to 3 hours before you lie down. Late-night snacks aren't a good idea.  -- Chocolate, mint, and alcohol can make GERD worse. They relax the valve between the esophagus and the stomach.  -- Spicy foods, fatty foods, foods that have a lot of acid (like tomatoes and oranges), and coffee can make GERD symptoms worse in some people. If your symptoms are worse after you eat a certain food, you may want to stop eating that food to see if your symptoms get better.    2. Do not smoke or chew tobacco.    3. If you have GERD symptoms at night, raise the head of your bed 6 in. (15 cm) to 8 in. (20 cm) by putting the frame on blocks or placing a foam wedge under the head of your mattress. (Adding extra pillows does not work.)    4. Avoid or reduce pressure on your stomach. Don't wear tight clothing around your middle.    5. Lose weight if you need to. Losing just 5 to 10 pounds can help.

## 2024-10-08 NOTE — LETTER
10/8/2024      Alexi Montemayor  [address] [facility] [id number]      Dear Colleague,    Thank you for referring your patient, Alexi Montemayor, to the [facility] [place]. Please see a copy of my visit note below.    Virtual Visit Details    Type of service:  Video Visit   Joined the call at 10/8/2024, 3:06:51 pm.  Left the call at 10/8/2024, 3:33:19 pm.  You were on the call for 26 minutes 28 seconds .    Originating Location (pt. Location): Home    Distant Location (provider location):  Off-site  Platform used for Video Visit: [facility]        GI CLINIC VISIT    CC/REFERRING MD:  Marnie Rdz  REASON FOR CONSULTATION: R10.9 (ICD-10-CM) - Recurrent abdominal pain     ASSESSMENT/PLAN:  Alexi Montemayor is a 39 year old year old female with PMHx significant for MDD/anxiety who presents seeing the  [facility] GI team for intermittent RUQ > LUQ abdominal pain x 10 years w/o alarm sx. It can worsen with NSAID use and ingestion of spicy foods. It has improved and resolved with short courses of PPI. She does run constipated, can go days without stooling.     External labs include normal CBC, CMP, TSH. Celiac serologies were NEG a year ago. No total IgA. H.pylori stool antigen was NEG 11 mo ago (reportedly off PPI).     11/30/2023 laparoscopic cholecystectomy for symptomatic cholelithiasis    Fhx is non-contributoary.     Never had a colonoscopy    #Abd pain x 10 years   #Stooling   Intermittent and fleeting, RUQ>LUQ. Hx is suggestive of gastritis with ddx to include hypervisceral sensitivity/functional disease and retained stools contributing to pain picture. Atypical biliary etiology is thought less likely given s/p CCY status and normal hepatic function panel.     -bowel regiment, per AVS  -if pain were to recur again, can consider short PPI course per AVS  -Okay to take as needed Pepcid per AVS  -add on total IgA  -S/Sx of gastric ulcer provided today  -No indication  for endoscopy today    Future consideration  Bowel cleanout  PPI trial plus minus scheduled Pepcid  Proceed with EGD for development of alarm symptoms    No orders of the defined types were placed in this encounter.    Colorectal cancer screening: Aged 45 unless otherwise clinically indicated    RTC 8 week or sooner PRN     Thank you for this consultation.  It was a pleasure to participate in the care of this patient; please contact me with any further questions.     Jessica oCntreras PA-C    Follow up: As planned above. Today, I personally spent 26 minutes in direct face to face time with the patient, of which greater than 50% of the time was spent in patient education and counseling as described above. Approximately 19 minutes were spent on indirect care associated with the patient's consultation including but not limited to review of: patient medical records to date, clinic visits, hospital records, lab results, imaging studies, procedural documentation, and coordinating care with other providers. The findings from this review are summarized in the above note. All of the above accounted for a cumulative time of 45 minutes and was performed on the date of service.     HPI  Alexi Montemayor is a 39 year old year old female with PMHx of MDD/anxiety following with the [facility] GI group for abdominal pain.     1. Intermittent abd pain x 10 years. RUQ>LUQ  -fleeting sensation for 5-10 sec then resolves, burning or gnawing sensation . Overall stable w/o progression.   -typically notices it 1-2 x every few weeks. Can have a few episodes that same day. No episodes of pain this week.   -NSAID use and spicy foods tend to trigger this pain  -improves and resolves w/ short course PPI   -had bad abd pain shortly after CCY so took prevacid BID x 1 week with resolution of this pain  -does have occasional heartburn but denies dysphagia, odynphagia.     H.pylori was NEG 11 mo ago (reportedly off PPI)    BM - can have upwards to 3-4d w/o  stooling. Can be harder big pellets or a large firm stool. No bloody or black stools. Does strain. Good evacuation. 5 min to stool.   -not on bowel regimen currently     ROS  Denies fevers, chills, weight loss, nausea, emesis, dysphagia, odynophagia , appetite change, unintentional weight loss, regurgitation, black tarrying stools, bloody stools.     Family Hx  Colon polyps in family, no other specific details known (size of polyp, type of polyp, etc). She denies being told she needed a sooner colonoscopy.      No other known family history or GI related malignancy (esophageal, gastric, pancreatic, liver or colon) or family history of IBD/celiac disease.     Social Hx   No use of NSAIDs now d/t pain   Will take PRN Tylenol     Rare ETOH  Never tobacco products   No recreational drug use     PROBLEM LIST  Patient Active Problem List    Diagnosis Date Noted     Gallstones 11/27/2023     Priority: Medium     Major depressive disorder, recurrent episode, moderate with anxious distress (H) 02/11/2019     Priority: Medium     Anxiety 06/21/2017     Priority: Medium     Hepatic steatosis 10/04/2024     Priority: Low     Class: Minor       PERTINENT PAST MEDICAL HISTORY:  Past Medical History:   Diagnosis Date     Anxiety      Depression        PREVIOUS SURGERIES:  Past Surgical History:   Procedure Laterality Date     EXTRACTION(S) DENTAL       LAPAROSCOPIC CHOLECYSTECTOMY N/A 11/30/2023    Procedure: CHOLECYSTECTOMY, LAPAROSCOPIC;  Surgeon: Parish Dawkins MD;  Location: [facility] OR     Skin mole removal         ALLERGIES:     Allergies   Allergen Reactions     Pcn [Penicillins] Hives     Sulfa Antibiotics Hives       PERTINENT MEDICATIONS:    Current Outpatient Medications:      citalopram (CELEXA) 20 MG tablet, Take 2 tablets (40 mg) by mouth daily, Disp: , Rfl: 1     multivitamin w/minerals (THERA-VIT-M) tablet, Take 1 tablet by mouth daily, Disp: , Rfl:     SOCIAL HISTORY:  Social History     Socioeconomic History      Marital status:      Spouse name: Miguel Monzon     Number of children: 2     Years of education: Not on file     Highest education level: High school graduate   Occupational History     Occupation: pharmacy tech   Tobacco Use     Smoking status: Never     Passive exposure: Never     Smokeless tobacco: Never   Vaping Use     Vaping status: Never Used   Substance and Sexual Activity     Alcohol use: Not Currently     Comment: Rarely     Drug use: No     Sexual activity: Yes     Partners: Male     Birth control/protection: Condom   Other Topics Concern     Parent/sibling w/ CABG, MI or angioplasty before 65F 55M? No   Social History Narrative     Not on file     Social Determinants of Health     Financial Resource Strain: Low Risk  (8/21/2024)    Financial Resource Strain      Within the past 12 months, have you or your family members you live with been unable to get utilities (heat, electricity) when it was really needed?: No   Food Insecurity: Low Risk  (8/21/2024)    Food Insecurity      Within the past 12 months, did you worry that your food would run out before you got money to buy more?: No      Within the past 12 months, did the food you bought just not last and you didn t have money to get more?: No   Transportation Needs: Low Risk  (8/21/2024)    Transportation Needs      Within the past 12 months, has lack of transportation kept you from medical appointments, getting your medicines, non-medical meetings or appointments, work, or from getting things that you need?: No   Physical Activity: Insufficiently Active (8/21/2024)    Exercise Vital Sign      Days of Exercise per Week: 5 days      Minutes of Exercise per Session: 10 min   Stress: No Stress Concern Present (8/21/2024)    [employer] [place] [employer] - Occupational Stress Questionnaire      Feeling of Stress : Not at all   Social Connections: Moderately Integrated (8/21/2024)    Social Connection and Isolation Panel [NHANES]       Frequency of Communication with Friends and Family: Three times a week      Frequency of Social Gatherings with Friends and Family: Three times a week      Attends Islam Services: 1 to 4 times per year      Active Member of Clubs or Organizations: No      Attends Club or Organization Meetings: Patient declined      Marital Status:    Interpersonal Safety: Low Risk  (8/22/2024)    Interpersonal Safety      Do you feel physically and emotionally safe where you currently live?: Yes      Within the past 12 months, have you been hit, slapped, kicked or otherwise physically hurt by someone?: No      Within the past 12 months, have you been humiliated or emotionally abused in other ways by your partner or ex-partner?: No   Housing Stability: Low Risk  (8/21/2024)    Housing Stability      Do you have housing? : Yes      Are you worried about losing your housing?: No       FAMILY HISTORY:  Family History   Problem Relation Age of Onset     Cervical Cancer Mother      Multiple Sclerosis Mother      Vaginal Cancer Mother      Anxiety Disorder Mother      Diabetes Father      Hypertension Father      Alcoholism Father      Substance Abuse Father         alcoholic     Obesity Father      Depression Sister      Anxiety Disorder Sister      Obesity Sister      Other - See Comments Sister         MOG- like MS     Diabetes Type 2  Maternal Grandmother      Hypertension Maternal Grandmother      Schizophrenia Maternal Aunt      Rheumatoid Arthritis Maternal Uncle      Mental Illness Cousin      Glaucoma No family hx of      Macular Degeneration No family hx of      Colon Cancer No family hx of      Breast Cancer No family hx of        Past/family/social history reviewed and no changes    PHYSICAL EXAMINATION:  Vitals reviewed: LMP 07/01/2024 (Approximate)   Video physical exam  General: Patient appears well in no acute distress.   Skin: No visualized rash or lesions on visualized skin  Eyes: EOMI, no erythema, sclera  icterus or discharge noted  Resp: Appears to be breathing comfortably without accessory muscle usage, speaking in full sentences, no cough  MSK: Appears to have normal range of motion based on visualized movements  Neurologic: No apparent tremors, facial movements symmetric  Psych: affect normal, alert and oriented    PERTINENT STUDIES:    Office Visit on 08/22/2024   Component Date Value Ref Range Status     Sodium 08/22/2024 139  135 - 145 mmol/L Final     Potassium 08/22/2024 4.3  3.4 - 5.3 mmol/L Final     Carbon Dioxide (CO2) 08/22/2024 24  22 - 29 mmol/L Final     Anion Gap 08/22/2024 10  7 - 15 mmol/L Final     Urea Nitrogen 08/22/2024 11.6  6.0 - 20.0 mg/dL Final     Creatinine 08/22/2024 0.75  0.51 - 0.95 mg/dL Final     GFR Estimate 08/22/2024 >90  >60 mL/min/1.73m2 Final     Calcium 08/22/2024 9.7  8.8 - 10.4 mg/dL Final     Chloride 08/22/2024 105  98 - 107 mmol/L Final     Glucose 08/22/2024 90  70 - 99 mg/dL Final     Alkaline Phosphatase 08/22/2024 66  40 - 150 U/L Final     AST 08/22/2024 17  0 - 45 U/L Final     ALT 08/22/2024 17  0 - 50 U/L Final     Protein Total 08/22/2024 7.8  6.4 - 8.3 g/dL Final     Albumin 08/22/2024 4.3  3.5 - 5.2 g/dL Final     Bilirubin Total 08/22/2024 0.4  <=1.2 mg/dL Final     Patient Fasting > 8hrs? 08/22/2024 No   Final     CRP Inflammation 08/22/2024 10.90 (H)  <5.00 mg/L Final     Erythrocyte Sedimentation Rate 08/22/2024 24 (H)  0 - 20 mm/hr Final     Cholesterol 08/22/2024 171  <200 mg/dL Final     Triglycerides 08/22/2024 72  <150 mg/dL Final     Direct Measure HDL 08/22/2024 45 (L)  >=50 mg/dL Final     LDL Cholesterol Calculated 08/22/2024 112 (H)  <=100 mg/dL Final     Non HDL Cholesterol 08/22/2024 126  <130 mg/dL Final     Patient Fasting > 8hrs? 08/22/2024 No   Final     TSH 08/22/2024 1.64  0.30 - 4.20 uIU/mL Final     WBC Count 08/22/2024 6.1  4.0 - 11.0 10e3/uL Final     RBC Count 08/22/2024 4.94  3.80 - 5.20 10e6/uL Final     Hemoglobin 08/22/2024 12.3   11.7 - 15.7 g/dL Final     Hematocrit 08/22/2024 39.1  35.0 - 47.0 % Final     MCV 08/22/2024 79  78 - 100 fL Final     MCH 08/22/2024 24.9 (L)  26.5 - 33.0 pg Final     MCHC 08/22/2024 31.5  31.5 - 36.5 g/dL Final     RDW 08/22/2024 13.5  10.0 - 15.0 % Final     Platelet Count 08/22/2024 281  150 - 450 10e3/uL Final     % Neutrophils 08/22/2024 63  % Final     % Lymphocytes 08/22/2024 28  % Final     % Monocytes 08/22/2024 7  % Final     % Eosinophils 08/22/2024 2  % Final     % Basophils 08/22/2024 0  % Final     % Immature Granulocytes 08/22/2024 0  % Final     Absolute Neutrophils 08/22/2024 3.8  1.6 - 8.3 10e3/uL Final     Absolute Lymphocytes 08/22/2024 1.7  0.8 - 5.3 10e3/uL Final     Absolute Monocytes 08/22/2024 0.4  0.0 - 1.3 10e3/uL Final     Absolute Eosinophils 08/22/2024 0.1  0.0 - 0.7 10e3/uL Final     Absolute Basophils 08/22/2024 0.0  0.0 - 0.2 10e3/uL Final     Absolute Immature Granulocytes 08/22/2024 0.0  <=0.4 10e3/uL Final        Lab Results   Component Value Date    WBC 6.1 08/22/2024    WBC 7.4 11/23/2023    WBC 7.0 10/17/2023    HGB 12.3 08/22/2024    HGB 12.6 11/23/2023    HGB 11.8 10/17/2023     08/22/2024     11/23/2023     10/17/2023    CHOL 171 08/22/2024    CHOL 172 09/15/2023    CHOL 192 09/02/2023    TRIG 72 08/22/2024    TRIG 80 09/15/2023    TRIG 164 (H) 09/02/2023    HDL 45 (L) 08/22/2024    HDL 38 (L) 09/15/2023    HDL 41 (L) 09/02/2023    ALT 17 08/22/2024    ALT 10 11/23/2023    ALT 13 10/17/2023    AST 17 08/22/2024    AST 12 11/23/2023    AST 15 10/17/2023     08/22/2024     11/23/2023     10/17/2023    BUN 11.6 08/22/2024    BUN 12.7 11/23/2023    BUN 9.5 10/17/2023    CO2 24 08/22/2024    CO2 27 11/23/2023    CO2 25 10/17/2023    TSH 1.64 08/22/2024    TSH 2.08 08/18/2023    TSH 1.28 12/15/2020        Liver Function Studies -   Recent Labs   Lab Test 08/22/24  0939   PROTTOTAL 7.8   ALBUMIN 4.3   BILITOTAL 0.4   ALKPHOS 66   AST 17    ALT 17        PREVIOUS ENDOSCOPY    No results found for this or any previous visit.      Again, thank you for allowing me to participate in the care of your patient.        Sincerely,        Jessica Contreras PA-C

## 2024-10-08 NOTE — NURSING NOTE
Current patient location: [id number] [place] [id number]    Is the patient currently in the state of [place]? YES    Visit mode:VIDEO    If the visit is dropped, the patient can be reconnected by: VIDEO VISIT: Text to cell phone:   Telephone Information:   Mobile [phone]       Will anyone else be joining the visit? NO  (If patient encounters technical issues they should call [phone])    Are changes needed to the allergy or medication list? No    Are refills needed on medications prescribed by this physician? NO    Rooming Documentation:  Unable to complete questionnaire(s) due to time    Reason for visit: Consult    Colt ARTEAGA

## 2024-10-17 ENCOUNTER — TELEPHONE (OUTPATIENT)
Dept: GASTROENTEROLOGY | Facility: CLINIC | Age: 39
End: 2024-10-17

## 2024-10-17 ENCOUNTER — LAB (OUTPATIENT)
Dept: LAB | Facility: CLINIC | Age: 39
End: 2024-10-17
Payer: COMMERCIAL

## 2024-10-17 DIAGNOSIS — R10.9 RECURRENT ABDOMINAL PAIN: ICD-10-CM

## 2024-10-17 PROCEDURE — 36415 COLL VENOUS BLD VENIPUNCTURE: CPT

## 2024-10-17 PROCEDURE — 82784 ASSAY IGA/IGD/IGG/IGM EACH: CPT

## 2024-10-17 NOTE — TELEPHONE ENCOUNTER
Left Voicemail (1st Attempt) and Sent Mychart (1st Attempt) for the patient to call back and schedule the following:    Appointment type: Return GI  Provider: Jessica Contreras  Return date: 2 mo (around Dec 2024)  Specialty phone number: [phone]  Additional appointment(s) needed: NA- patient has lab appointment on 10/17/24  Additonal Notes: LVM/Ana x1

## 2024-10-21 LAB — IGA SERPL-MCNC: 263 MG/DL (ref 84–499)

## 2024-12-16 ENCOUNTER — NURSE TRIAGE (OUTPATIENT)
Dept: NURSING | Facility: CLINIC | Age: 39
End: 2024-12-16
Payer: COMMERCIAL

## 2024-12-16 ENCOUNTER — OFFICE VISIT (OUTPATIENT)
Dept: FAMILY MEDICINE | Facility: CLINIC | Age: 39
End: 2024-12-16
Payer: COMMERCIAL

## 2024-12-16 VITALS
HEIGHT: 64 IN | OXYGEN SATURATION: 98 % | SYSTOLIC BLOOD PRESSURE: 118 MMHG | HEART RATE: 91 BPM | RESPIRATION RATE: 16 BRPM | BODY MASS INDEX: 33.97 KG/M2 | TEMPERATURE: 98.3 F | WEIGHT: 199 LBS | DIASTOLIC BLOOD PRESSURE: 81 MMHG

## 2024-12-16 DIAGNOSIS — D69.2 PURPURA (H): Primary | ICD-10-CM

## 2024-12-16 LAB
ANION GAP SERPL CALCULATED.3IONS-SCNC: 11 MMOL/L (ref 7–15)
BUN SERPL-MCNC: 10.5 MG/DL (ref 6–20)
CALCIUM SERPL-MCNC: 9.2 MG/DL (ref 8.8–10.4)
CHLORIDE SERPL-SCNC: 105 MMOL/L (ref 98–107)
CREAT SERPL-MCNC: 0.75 MG/DL (ref 0.51–0.95)
CRP SERPL-MCNC: 8.07 MG/L
EGFRCR SERPLBLD CKD-EPI 2021: >90 ML/MIN/1.73M2
ERYTHROCYTE [DISTWIDTH] IN BLOOD BY AUTOMATED COUNT: 13.2 % (ref 10–15)
ERYTHROCYTE [SEDIMENTATION RATE] IN BLOOD BY WESTERGREN METHOD: 22 MM/HR (ref 0–20)
GLUCOSE SERPL-MCNC: 87 MG/DL (ref 70–99)
HCO3 SERPL-SCNC: 24 MMOL/L (ref 22–29)
HCT VFR BLD AUTO: 38.1 % (ref 35–47)
HGB BLD-MCNC: 12.5 G/DL (ref 11.7–15.7)
MCH RBC QN AUTO: 25.9 PG (ref 26.5–33)
MCHC RBC AUTO-ENTMCNC: 32.8 G/DL (ref 31.5–36.5)
MCV RBC AUTO: 79 FL (ref 78–100)
PLATELET # BLD AUTO: 291 10E3/UL (ref 150–450)
POTASSIUM SERPL-SCNC: 4 MMOL/L (ref 3.4–5.3)
RBC # BLD AUTO: 4.83 10E6/UL (ref 3.8–5.2)
SODIUM SERPL-SCNC: 140 MMOL/L (ref 135–145)
WBC # BLD AUTO: 6 10E3/UL (ref 4–11)

## 2024-12-16 PROCEDURE — 85027 COMPLETE CBC AUTOMATED: CPT | Performed by: FAMILY MEDICINE

## 2024-12-16 PROCEDURE — 86140 C-REACTIVE PROTEIN: CPT | Performed by: FAMILY MEDICINE

## 2024-12-16 PROCEDURE — 36415 COLL VENOUS BLD VENIPUNCTURE: CPT | Performed by: FAMILY MEDICINE

## 2024-12-16 PROCEDURE — 85652 RBC SED RATE AUTOMATED: CPT | Performed by: FAMILY MEDICINE

## 2024-12-16 PROCEDURE — 99214 OFFICE O/P EST MOD 30 MIN: CPT | Performed by: FAMILY MEDICINE

## 2024-12-16 PROCEDURE — 80048 BASIC METABOLIC PNL TOTAL CA: CPT | Performed by: FAMILY MEDICINE

## 2024-12-16 ASSESSMENT — PAIN SCALES - GENERAL: PAINLEVEL_OUTOF10: NO PAIN (0)

## 2024-12-16 NOTE — TELEPHONE ENCOUNTER
Nurse Triage SBAR    Is this a 2nd Level Triage? YES, LICENSED PRACTITIONER REVIEW IS REQUIRED    Situation: Tonight when she took her shirt off, her abdomen and forearms are covered with bright red spots. The don't itch. It only happens when she visits with her mother-in-law at her home. Patient doesn't usually drink alcohol, but did while there.    Background: Patient calling. A couple of months ago she noticed some red spots on her feet.     Assessment: Does patient need to be seen within the 4-hour care advice or can it wait until clinic hours    Protocol Recommended Disposition:   See HCP Within 4 Hours (Or PCP Triage)    Recommendation: Does patient need to be seen within the 4-hour care advice or can it wait until clinic hours     Paged to provider    MD consult, Dr. Parra Paged by RN at 12:31 AM  Reason for page: Rash    Provider, Dr. Parra, returning page to [facility] at 12:32 AM  Provider Recommendations/Plan:  Wait and be seen tomorrow morning.    Care advice given as above. Patient was encouraged to call the clinic in the morning for a same-day appointment, or if none are available, to be seen at urgent care. Patient states understanding.     Marietta Harris RN  [facility]  December 16, 2024, 12:43 AM    Does the patient meet one of the following criteria for ADS visit consideration? 16+ years old, with an [facility] PCP     TIP  Providers, please consider if this condition is appropriate for management at one of our Acute and Diagnostic Services sites.     If patient is a good candidate, please use dotphrase <dot>triageresponse and select Refer to ADS to document.             Reason for Disposition   [1] Localized purple or blood-colored spots or dots AND [2] not from injury or friction AND [3] no fever    Additional Information   Negative: [1] Sudden onset of rash (within last 2 hours) AND [2] difficulty breathing or swallowing   Negative: Sounds like a life-threatening emergency  to the triager   Negative: Athlete's Foot suspected (i.e., itchy rash between the toes)   Negative: Impetigo suspected (i.e., painless infected superficial small sores, less than 1 inch or 2.5 cm, often covered by a soft, yellow-brown scab or crust; sometimes occurring near nasal openings)   Negative: Insect bite(s) suspected   Negative: Jock Itch suspected (i.e., itchy rash on inner thighs near genital area)   Negative: Localized lump (or swelling) without redness or rash   Negative: [1] Mpox suspected (e.g., direct skin contact such as sex, recent travel to [place] or [place]) AND [2] symptoms of Mpox (e.g., rash, fever, muscle aches, or swollen lymph nodes)   Negative: [1] At risk for Mpox (men-who-have-sex-with-men) AND [2] possible exposure (e.g., multiple sex partners in past 21 days) AND [3] symptoms of Mpox (e.g., rash, fever, muscle aches, or swollen lymph nodes)   Negative: Poison ivy, oak, or sumac rash suspected (e.g., itchy rash after contact with poison ivy)   Negative: Rash of female genital area  (e.g., labia, vagina, vulva)   Negative: Rash of male genital area (e.g., penis, scrotum)   Negative: Redness of immunization site   Negative: Ringworm suspected (i.e., round pink patch, sometimes looks like ring, usually 1/2 to 1 inch [12-25 mm],  in size, slowly increasing in size)   Negative: Shingles suspected (i.e., painful rash, multiple small blisters grouped together in one area of body; dermatomal distribution)   Negative: Small spot, skin growth, or mole   Negative: Sores or skin ulcer, not a rash   Negative: Wound infection suspected (i.e., pain, spreading redness, or pus; in a cut, puncture, scrape or sutured wound)   Negative: [1] Localized purple or blood-colored spots or dots AND [2] not from injury or friction AND [3] fever   Negative: Patient sounds very sick or weak to the triager   Negative: [1] Red area or streak AND [2] fever   Negative: [1] Rash is painful to touch AND [2] fever    Negative: [1] Looks infected (spreading redness, pus) AND [2] large red area (> 2 in. or 5 cm)   Negative: [1] Looks infected (spreading redness, pus) AND [2] diabetes mellitus or weak immune system (e.g., HIV positive, cancer chemo, splenectomy, organ transplant, chronic steroids)    Protocols used: Rash or Redness - [id number]-GP

## 2024-12-16 NOTE — PROGRESS NOTES
Assessment and Plan    (D69.2) Purpura (H)  (primary encounter diagnosis)  Comment: no clear cause, plt nl.  Does have mildly elevated inflammatory markers and I suspect some form of vasculitis.  Will refer to theum, but as no systemic or constitutional sx will not otherwise treat  Plan: CBC with platelets, Basic metabolic panel  (Ca,        Cl, CO2, Creat, Gluc, K, Na, BUN), CRP,         inflammation, ESR: Erythrocyte sedimentation         rate, Adult Rheumatology  Referral              RTC in  estrella Rivas MD     Adrian Truong is a 39 year old, presenting for the following health issues:  Derm Problem        12/16/2024     9:44 AM   Additional Questions   Roomed by Laney CRAIG     History of Present Illness       Reason for visit:  Rash  Symptom onset:  1-3 days ago  Symptoms include:  Looks like petechei. This time its the worst-- all over my stomach, legs and arms. The first episode (2 months ago) was on my feet  Symptom intensity:  Moderate  Symptom progression:  Staying the same  Had these symptoms before:  Yes  Has tried/received treatment for these symptoms:  No  What makes it worse:  Cannot pin point the cause. But has happened 2 times after being at my mother in laws house. And after 2 episodes of drinking alcohol  What makes it better:  Is not bothersome; but concerning She is missing 1 dose(s) of medications per week.  She is not taking prescribed medications regularly due to remembering to take.     The patient presents with a recurrent rash characterized by dots all over the body, worse on the stomach. This is the third episode, with the first occurrence affecting only the feet, the second on the calves, and the current episode involving the stomach, legs, and arms. The rash started yesterday and is neither pruritic nor painful.    The patient denies any changes in soaps or other topical products. Recent dietary intake includes alcohol, pork, vegetables, and papaya salad.  "The patient reports a correlation between alcohol consumption and the appearance of the rash on two occasions.    The patient has a family history significant for autoimmune conditions, including lupus in the mother, fibromyalgia in the sister, and unspecified rashes in the father. The patient previously consulted a rheumatologist one year ago for joint pains, at which time rheumatoid arthritis was ruled out after laboratory testing.    The patient reports occasional alcohol consumption. In the review of systems, there is no nausea, diarrhea, or bloody diarrhea reported.      Objective    /81 (BP Location: Right arm, Patient Position: Sitting, Cuff Size: Adult Large)   Pulse 91   Temp 98.3  F (36.8  C) (Oral)   Resp 16   Ht 1.619 m (5' 3.75\")   Wt 90.3 kg (199 lb)   LMP 12/09/2024 (Approximate)   SpO2 98%   BMI 34.43 kg/m    Body mass index is 34.43 kg/m .  Physical Exam  Vitals reviewed.   HENT:      Head: Normocephalic and atraumatic.   Eyes:      Conjunctiva/sclera: Conjunctivae normal.   Cardiovascular:      Rate and Rhythm: Normal rate and regular rhythm.      Heart sounds: Normal heart sounds.   Pulmonary:      Effort: Pulmonary effort is normal.      Breath sounds: Normal breath sounds.   Skin:     General: Skin is warm and dry.      Comments: Diffuse petechial rash, most notably on lower abdomen.   Neurological:      Mental Status: She is alert and oriented to person, place, and time.   Psychiatric:         Mood and Affect: Mood normal.         Behavior: Behavior normal.              Signed Electronically by: Vinod Rivas MD    "

## 2024-12-30 VITALS
DIASTOLIC BLOOD PRESSURE: 95 MMHG | TEMPERATURE: 98.3 F | SYSTOLIC BLOOD PRESSURE: 155 MMHG | RESPIRATION RATE: 18 BRPM | OXYGEN SATURATION: 98 % | WEIGHT: 202.38 LBS | HEART RATE: 95 BPM | HEIGHT: 63 IN | BODY MASS INDEX: 35.86 KG/M2

## 2024-12-30 LAB
ALBUMIN SERPL BCG-MCNC: 4.4 G/DL (ref 3.5–5.2)
ALBUMIN UR-MCNC: NEGATIVE MG/DL
ALP SERPL-CCNC: 72 U/L (ref 40–150)
ALT SERPL W P-5'-P-CCNC: 14 U/L (ref 0–50)
ANION GAP SERPL CALCULATED.3IONS-SCNC: 13 MMOL/L (ref 7–15)
APPEARANCE UR: CLEAR
AST SERPL W P-5'-P-CCNC: 15 U/L (ref 0–45)
BACTERIA #/AREA URNS HPF: ABNORMAL /HPF
BASOPHILS # BLD AUTO: 0 10E3/UL (ref 0–0.2)
BASOPHILS NFR BLD AUTO: 0 %
BILIRUB SERPL-MCNC: 0.2 MG/DL
BILIRUB UR QL STRIP: NEGATIVE
BUN SERPL-MCNC: 11.4 MG/DL (ref 6–20)
CALCIUM SERPL-MCNC: 9.5 MG/DL (ref 8.8–10.4)
CHLORIDE SERPL-SCNC: 103 MMOL/L (ref 98–107)
COLOR UR AUTO: ABNORMAL
CREAT SERPL-MCNC: 0.8 MG/DL (ref 0.51–0.95)
EGFRCR SERPLBLD CKD-EPI 2021: >90 ML/MIN/1.73M2
EOSINOPHIL # BLD AUTO: 0.2 10E3/UL (ref 0–0.7)
EOSINOPHIL NFR BLD AUTO: 2 %
ERYTHROCYTE [DISTWIDTH] IN BLOOD BY AUTOMATED COUNT: 13.4 % (ref 10–15)
GLUCOSE SERPL-MCNC: 118 MG/DL (ref 70–99)
GLUCOSE UR STRIP-MCNC: NEGATIVE MG/DL
HCG UR QL: NEGATIVE
HCO3 SERPL-SCNC: 22 MMOL/L (ref 22–29)
HCT VFR BLD AUTO: 38 % (ref 35–47)
HGB BLD-MCNC: 12.4 G/DL (ref 11.7–15.7)
HGB UR QL STRIP: NEGATIVE
HOLD SPECIMEN: NORMAL
HOLD SPECIMEN: NORMAL
IMM GRANULOCYTES # BLD: 0 10E3/UL
IMM GRANULOCYTES NFR BLD: 0 %
KETONES UR STRIP-MCNC: NEGATIVE MG/DL
LEUKOCYTE ESTERASE UR QL STRIP: NEGATIVE
LYMPHOCYTES # BLD AUTO: 3 10E3/UL (ref 0.8–5.3)
LYMPHOCYTES NFR BLD AUTO: 32 %
MCH RBC QN AUTO: 25.8 PG (ref 26.5–33)
MCHC RBC AUTO-ENTMCNC: 32.6 G/DL (ref 31.5–36.5)
MCV RBC AUTO: 79 FL (ref 78–100)
MONOCYTES # BLD AUTO: 0.5 10E3/UL (ref 0–1.3)
MONOCYTES NFR BLD AUTO: 6 %
NEUTROPHILS # BLD AUTO: 5.7 10E3/UL (ref 1.6–8.3)
NEUTROPHILS NFR BLD AUTO: 60 %
NITRATE UR QL: NEGATIVE
NRBC # BLD AUTO: 0 10E3/UL
NRBC BLD AUTO-RTO: 0 /100
PH UR STRIP: 6.5 [PH] (ref 5–7)
PLATELET # BLD AUTO: 270 10E3/UL (ref 150–450)
POTASSIUM SERPL-SCNC: 3.7 MMOL/L (ref 3.4–5.3)
PROT SERPL-MCNC: 7.9 G/DL (ref 6.4–8.3)
RBC # BLD AUTO: 4.81 10E6/UL (ref 3.8–5.2)
RBC URINE: <1 /HPF
SODIUM SERPL-SCNC: 138 MMOL/L (ref 135–145)
SP GR UR STRIP: 1 (ref 1–1.03)
SQUAMOUS EPITHELIAL: 1 /HPF
UROBILINOGEN UR STRIP-MCNC: NORMAL MG/DL
WBC # BLD AUTO: 9.5 10E3/UL (ref 4–11)
WBC URINE: 1 /HPF

## 2024-12-30 PROCEDURE — 81025 URINE PREGNANCY TEST: CPT | Performed by: EMERGENCY MEDICINE

## 2024-12-30 PROCEDURE — 99285 EMERGENCY DEPT VISIT HI MDM: CPT | Mod: 25

## 2024-12-30 PROCEDURE — 36415 COLL VENOUS BLD VENIPUNCTURE: CPT | Performed by: EMERGENCY MEDICINE

## 2024-12-30 PROCEDURE — 82310 ASSAY OF CALCIUM: CPT | Performed by: EMERGENCY MEDICINE

## 2024-12-30 PROCEDURE — 82947 ASSAY GLUCOSE BLOOD QUANT: CPT | Performed by: EMERGENCY MEDICINE

## 2024-12-30 PROCEDURE — 81003 URINALYSIS AUTO W/O SCOPE: CPT | Performed by: EMERGENCY MEDICINE

## 2024-12-30 PROCEDURE — 85004 AUTOMATED DIFF WBC COUNT: CPT | Performed by: EMERGENCY MEDICINE

## 2024-12-30 ASSESSMENT — COLUMBIA-SUICIDE SEVERITY RATING SCALE - C-SSRS
6. HAVE YOU EVER DONE ANYTHING, STARTED TO DO ANYTHING, OR PREPARED TO DO ANYTHING TO END YOUR LIFE?: NO
2. HAVE YOU ACTUALLY HAD ANY THOUGHTS OF KILLING YOURSELF IN THE PAST MONTH?: NO
1. IN THE PAST MONTH, HAVE YOU WISHED YOU WERE DEAD OR WISHED YOU COULD GO TO SLEEP AND NOT WAKE UP?: NO

## 2024-12-31 ENCOUNTER — TRANSFERRED RECORDS (OUTPATIENT)
Dept: HEALTH INFORMATION MANAGEMENT | Facility: CLINIC | Age: 39
End: 2024-12-31

## 2024-12-31 ENCOUNTER — APPOINTMENT (OUTPATIENT)
Dept: CT IMAGING | Facility: CLINIC | Age: 39
End: 2024-12-31
Attending: EMERGENCY MEDICINE
Payer: COMMERCIAL

## 2024-12-31 ENCOUNTER — HOSPITAL ENCOUNTER (EMERGENCY)
Facility: CLINIC | Age: 39
Discharge: HOME | End: 2024-12-31
Attending: EMERGENCY MEDICINE
Payer: COMMERCIAL

## 2024-12-31 DIAGNOSIS — K63.89 EPIPLOIC APPENDAGITIS: ICD-10-CM

## 2024-12-31 PROCEDURE — 74177 CT ABD & PELVIS W/CONTRAST: CPT

## 2024-12-31 PROCEDURE — 250N000011 HC RX IP 250 OP 636: Performed by: EMERGENCY MEDICINE

## 2024-12-31 PROCEDURE — 250N000009 HC RX 250: Performed by: EMERGENCY MEDICINE

## 2024-12-31 RX ORDER — IOPAMIDOL 755 MG/ML
100 INJECTION, SOLUTION INTRAVASCULAR ONCE
Status: DISCONTINUED | OUTPATIENT
Start: 2024-12-31 | End: 2024-12-31

## 2024-12-31 RX ORDER — IOPAMIDOL 755 MG/ML
100 INJECTION, SOLUTION INTRAVASCULAR ONCE
Status: COMPLETED | OUTPATIENT
Start: 2024-12-31 | End: 2024-12-31

## 2024-12-31 RX ADMIN — SODIUM CHLORIDE 65 ML: 9 INJECTION, SOLUTION INTRAVENOUS at 02:14

## 2024-12-31 RX ADMIN — IOPAMIDOL 100 ML: 755 INJECTION, SOLUTION INTRAVENOUS at 02:06

## 2024-12-31 ASSESSMENT — ACTIVITIES OF DAILY LIVING (ADL)
ADLS_ACUITY_SCORE: 46
ADLS_ACUITY_SCORE: 46

## 2024-12-31 NOTE — ED TRIAGE NOTES
Pt. Presents to ED with complaints of lower abdominal cramping that is worse on her L side that worsens with movement that started today. Reports she is expecting her period around this time. Denies vaginal bleeding. Denies chance of pregnancy/breastfeeding. Denies N/V/D, fevers, or difficulty urinating. AVSS on RA. A & O x 4, independent. Reports she is currently being worked up for vasculitis.

## 2024-12-31 NOTE — ED PROVIDER NOTES
"    History     Chief Complaint:  Abdominal Pain       HPI   Alexi Montemayor is a 39 year old female working hx of possible vasculitis as rashes and petechiae unexplained ongoing chronic.  New for today and what brought her in was left lower qudrant pain.  Has chronic RUQ pain with US and previous workups.  No other bruising or bleeding.  No CP or SOB.  No NVD.  No blood in stool.  No VB VD.        Independent Historian:        Review of External Notes:  Office visit 12.16.24 for ongoing workup as above.      Medications:    citalopram (CELEXA) 20 MG tablet  multivitamin w/minerals (THERA-VIT-M) tablet        Past Medical History:    Past Medical History:   Diagnosis Date    Anxiety     Depression        Past Surgical History:    Past Surgical History:   Procedure Laterality Date    EXTRACTION(S) DENTAL      LAPAROSCOPIC CHOLECYSTECTOMY N/A 11/30/2023    Procedure: CHOLECYSTECTOMY, LAPAROSCOPIC;  Surgeon: Parish Dawkins MD;  Location: [facility] OR    Skin mole removal            Physical Exam   Patient Vitals for the past 24 hrs:   BP Temp Temp src Pulse Resp SpO2 Height Weight   12/30/24 2142 (!) 155/95 -- -- 95 18 98 % -- --   12/30/24 2140 -- 98.3  F (36.8  C) Oral -- -- -- -- --   12/30/24 2139 -- -- -- -- -- -- 1.6 m (5' 3\") 91.8 kg (202 lb 6.1 oz)        Physical Exam  General: Patient is well appearing. No distress.  Head: Atraumatic.  Eyes: Conjunctivae and EOM are normal. No scleral icterus.  Neck: Normal range of motion. Neck supple.   Cardiovascular: Normal rate, regular rhythm, normal heart sounds and intact distal pulses.   Pulmonary/Chest: Breath sounds normal. No respiratory distress.  Abdominal: Soft. Bowel sounds are normal. No distension. No tenderness. No rebound or guarding.   Musculoskeletal: Normal range of motion.  Skin: Warm and dry. No rash noted. Not diaphoretic.      Emergency Department Course   ECG      Imaging:  CT Abdomen Pelvis w Contrast   Final Result   IMPRESSION:    1.  Epiploic " appendagitis in the left anterior pelvis.          Laboratory:  Labs Ordered and Resulted from Time of ED Arrival to Time of ED Departure   ROUTINE UA WITH MICROSCOPIC REFLEX TO CULTURE - Abnormal       Result Value    Color Urine Straw      Appearance Urine Clear      Glucose Urine Negative      Bilirubin Urine Negative      Ketones Urine Negative      Specific Gravity Urine 1.003      Blood Urine Negative      pH Urine 6.5      Protein Albumin Urine Negative      Urobilinogen Urine Normal      Nitrite Urine Negative      Leukocyte Esterase Urine Negative      Bacteria Urine Few (*)     RBC Urine <1      WBC Urine 1      Squamous Epithelials Urine 1     COMPREHENSIVE METABOLIC PANEL - Abnormal    Sodium 138      Potassium 3.7      Carbon Dioxide (CO2) 22      Anion Gap 13      Urea Nitrogen 11.4      Creatinine 0.80      GFR Estimate >90      Calcium 9.5      Chloride 103      Glucose 118 (*)     Alkaline Phosphatase 72      AST 15      ALT 14      Protein Total 7.9      Albumin 4.4      Bilirubin Total 0.2     CBC WITH PLATELETS AND DIFFERENTIAL - Abnormal    WBC Count 9.5      RBC Count 4.81      Hemoglobin 12.4      Hematocrit 38.0      MCV 79      MCH 25.8 (*)     MCHC 32.6      RDW 13.4      Platelet Count 270      % Neutrophils 60      % Lymphocytes 32      % Monocytes 6      % Eosinophils 2      % Basophils 0      % Immature Granulocytes 0      NRBCs per 100 WBC 0      Absolute Neutrophils 5.7      Absolute Lymphocytes 3.0      Absolute Monocytes 0.5      Absolute Eosinophils 0.2      Absolute Basophils 0.0      Absolute Immature Granulocytes 0.0      Absolute NRBCs 0.0     HCG QUALITATIVE URINE - Normal    hCG Urine Qualitative Negative          Procedures       Emergency Department Course & Assessments:    Interventions:  Medications   iopamidol (ISOVUE-370) solution 100 mL (100 mLs Intravenous $Given 12/31/24 0203)   sodium chloride 0.9 % bag 500mL for CT scan flush use (65 mLs Intravenous $Given 12/31/24  0214)        Assessments:      Independent Interpretation (X-rays, CTs, rhythm strip):  CT abd no large obstruction mass bleed.  No stone.  No hydro.  Slight inflammation left anterior pelvis per colonic.  No free air or fluid.      Consultations/Discussion of Management or Tests:         Social Drivers of Health affecting care:       Disposition:  The patient was discharged.    Impression & Plan           Medical Decision Making:  Pt has no red flag abd nonsurgical or medical emergency lower abdominal pain on the left.  Labs urine and CT very reassuring.  Appears epiploic appendagitis.  Expectant and strict return and follow up instructions given.      Diagnosis:    ICD-10-CM    1. Epiploic appendagitis  K63.89            Discharge Medications:  New Prescriptions    No medications on file            12/31/2024   Enmanuel Schuler MD Stevens, Andrew C, MD  12/31/24 0243

## 2025-01-20 ENCOUNTER — TRANSFERRED RECORDS (OUTPATIENT)
Dept: HEALTH INFORMATION MANAGEMENT | Facility: CLINIC | Age: 40
End: 2025-01-20
Payer: COMMERCIAL

## 2025-02-06 ENCOUNTER — MEDICAL CORRESPONDENCE (OUTPATIENT)
Dept: HEALTH INFORMATION MANAGEMENT | Facility: CLINIC | Age: 40
End: 2025-02-06
Payer: COMMERCIAL

## 2025-02-17 ENCOUNTER — TELEPHONE (OUTPATIENT)
Dept: DERMATOLOGY | Facility: CLINIC | Age: 40
End: 2025-02-17
Payer: COMMERCIAL

## 2025-02-17 ENCOUNTER — TRANSFERRED RECORDS (OUTPATIENT)
Dept: HEALTH INFORMATION MANAGEMENT | Facility: CLINIC | Age: 40
End: 2025-02-17
Payer: COMMERCIAL

## 2025-02-17 NOTE — TELEPHONE ENCOUNTER
Called and spoke with pt and scheduled sooner appt    Kim HAWKINS RN  Dermatology   [phone]     Statement Selected

## 2025-02-17 NOTE — TELEPHONE ENCOUNTER
[facility] Call Center    Phone Message    May a detailed message be left on voicemail: yes     Reason for Call: Other: Pt is requesting to be seen sooner as the rash she has seems to be worsening. She does have an appointment scheduled for 02/24/25. Please call [phone] to advise     Action Taken: Other: OX Derm    Travel Screening: Not Applicable

## 2025-02-18 ENCOUNTER — OFFICE VISIT (OUTPATIENT)
Dept: DERMATOLOGY | Facility: CLINIC | Age: 40
End: 2025-02-18
Payer: COMMERCIAL

## 2025-02-18 DIAGNOSIS — L30.9 DERMATITIS: ICD-10-CM

## 2025-02-18 DIAGNOSIS — I78.8 CAPILLARITIS: Primary | ICD-10-CM

## 2025-02-18 PROCEDURE — 99204 OFFICE O/P NEW MOD 45 MIN: CPT | Performed by: STUDENT IN AN ORGANIZED HEALTH CARE EDUCATION/TRAINING PROGRAM

## 2025-02-18 RX ORDER — TRIAMCINOLONE ACETONIDE 1 MG/G
CREAM TOPICAL 2 TIMES DAILY
Qty: 80 G | Refills: 3 | Status: SHIPPED | OUTPATIENT
Start: 2025-02-18

## 2025-02-18 ASSESSMENT — PAIN SCALES - GENERAL: PAINLEVEL_OUTOF10: NO PAIN (0)

## 2025-02-18 NOTE — PATIENT INSTRUCTIONS
Treatment for capillaritis:    Rutoside (50mg twice daily)  +  Ascorbic acid (500-1000mg daily)    You can consider compression stockings to help reduce the inflammation.    _______________________________________________________       We would suggest you start with gentle, non irritating skin care:    Avoid fragrances and perfumes! Start selecting fragrance free soaps, detergents, and other household items.      Take warm (not hot!) short showers. Pat your skin dry, and right after the shower, start applying daily emollient to your skin. We suggest using Vanicream, Vaseline or Aquaphor after shower. Here are some of my favorite options for thick moisturizers. You want something out of a tub so it's thick and moisturizing.                          Proper skin care from [facility]:    -Eliminate harsh soaps as they strip the natural oils from the skin, often resulting in dry itchy skin ( i.e. Dial, Zest, [place] Spring)  -Use mild soaps such as Cetaphil or Dove Sensitive Skin in the shower. You do not need to use soap on arms, legs, and trunk every time you shower unless visibly soiled.   -Avoid hot or cold showers.  -After showering, lightly dry off and apply moisturizing within 2-3 minutes. This will help trap moisture in the skin.   -Aggressive use of a moisturizer at least 1-2 times a day to the entire body (including -Vanicream, Cetaphil, Aquaphor or Cerave) and moisturize hands after every washing.  -We recommend using moisturizers that come in a tub that needs to be scooped out, not a pump. This has more of an oil base. It will hold moisture in your skin much better than a water base moisturizer. The above recommended are non-pore clogging.      Wear a sunscreen with at least SPF 30 on your face, ears, neck and V of the chest daily. Wear sunscreen on other areas of the body if those areas are exposed to the sun throughout the day. Sunscreens can contain physical and/or chemical blockers. Physical  blockers are less likely to clog pores, these include zinc oxide and titanium dioxide. Reapply every two hour and after swimming.     Sunscreen examples: https://www.ewg.org/sunscreen/    UV radiation  UVA radiation remains constant throughout the day and throughout the year. It is a longer wavelength than UVB and therefore penetrates deeper into the skin leading to immediate and delayed tanning, photoaging, and skin cancer. 70-80% of UVA and UVB radiation occurs between the hours of 10am-2pm.  UVB radiation  UVB radiation causes the most harmful effects and is more significant during the summer months. However, snow and ice can reflect UVB radiation leading to skin damage during the winter months as well. UVB radiation is responsible for tanning, burning, inflammation, delayed erythema (pinkness), pigmentation (brown spots), and skin cancer.     I recommend self monthly full body exams and yearly full body exams with a dermatology provider. If you develop a new or changing lesion please follow up for examination. Most skin cancers are pink and scaly or pink and pearly. However, we do see blue/brown/black skin cancers.  Consider the ABCDEs of melanoma when giving yourself your monthly full body exam ( don't forget the groin, buttocks, feet, toes, etc). A-asymmetry, B-borders, C-color, D-diameter, E-elevation or evolving. If you see any of these changes please follow up in clinic. If you cannot see your back I recommend purchasing a hand held mirror to use with a larger wall mirror.       Checking for Skin Cancer  You can find cancer early by checking your skin each month. There are 3 kinds of skin cancer. They are melanoma, basal cell carcinoma, and squamous cell carcinoma. Doing monthly skin checks is the best way to find new marks or skin changes. Follow the instructions below for checking your skin.   The ABCDEs of checking moles for melanoma   Check your moles or growths for signs of melanoma using ABCDE:    Asymmetry: the sides of the mole or growth don t match  Border: the edges are ragged, notched, or blurred  Color: the color within the mole or growth varies  Diameter: the mole or growth is larger than 6 mm (size of a pencil eraser)  Evolving: the size, shape, or color of the mole or growth is changing (evolving is not shown in the images below)    Checking for other types of skin cancer  Basal cell carcinoma or squamous cell carcinoma have symptoms such as:     A spot or mole that looks different from all other marks on your skin  Changes in how an area feels, such as itching, tenderness, or pain  Changes in the skin's surface, such as oozing, bleeding, or scaliness  A sore that does not heal  New swelling or redness beyond the border of a mole    Who s at risk?  Anyone can get skin cancer. But you are at greater risk if you have:   Fair skin, light-colored hair, or light-colored eyes  Many moles or abnormal moles on your skin  A history of sunburns from sunlight or tanning beds  A family history of skin cancer  A history of exposure to radiation or chemicals  A weakened immune system  If you have had skin cancer in the past, you are at risk for recurring skin cancer.   How to check your skin  Do your monthly skin checkups in front of a full-length mirror. Check all parts of your body, including your:   Head (ears, face, neck, and scalp)  Torso (front, back, and sides)  Arms (tops, undersides, upper, and lower armpits)  Hands (palms, backs, and fingers, including under the nails)  Buttocks and genitals  Legs (front, back, and sides)  Feet (tops, soles, toes, including under the nails, and between toes)  If you have a lot of moles, take digital photos of them each month. Make sure to take photos both up close and from a distance. These can help you see if any moles change over time.   Most skin changes are not cancer. But if you see any changes in your skin, call your doctor right away. Only he or she can diagnose  a problem. If you have skin cancer, seeing your doctor can be the first step toward getting the treatment that could save your life.   [employer] last reviewed this educational content on 4/1/2019 2000-2020 [employer]. [address] [place] [address], [place] [address]. All rights reserved. This information is not intended as a substitute for professional medical care. Always follow your healthcare professional's instructions.       When should I call my doctor?  If you are worsening or not improving, please, contact us or seek urgent care as noted below.     Who should I call with questions (adults)?    [facility] [phone]  For urgent needs outside of business hours call the [facility] at [phone] and ask for the dermatology resident on call to be paged  If this is a medical emergency and you are unable to reach an ER, Call [phone]      If you need a prescription refill, please contact your pharmacy. Refills are approved or denied by our Physicians during normal business hours, Monday through Friday.  Per office policy, refills will not be granted if you have not been seen within the past year (or sooner depending on the condition).

## 2025-02-18 NOTE — LETTER
2/18/2025      Alexi Montemayor  [id number] [facility] [id number]      Dear Colleague,    Thank you for referring your patient, Alexi Montemayor, to the [facility]. Please see a copy of my visit note below.    [facility] Dermatology Note  Encounter Date: Feb 18, 2025  Office Visit     Dermatology Problem List:  1.  Capillaritis    ____________________________________________    Assessment & Plan:     # Capillaritis  Ddx: Capillaritis versus papular eczema versus early and/or late resolving LCV  -Start triamcinolone 0.1% cream, once daily as needed for itch or rash.  - Rutoside (50mg twice daily) and Ascorbic acid (500-1000mg daily)  - You can consider compression stockings to help reduce the inflammation.  -Reviewed return signs.  Encourage patient to return should she have more vascular, bloody or palpable purpura.  This to be assigned that this is progressing to leukocytoclastic vasculitis.  We would consider biopsy in this indication to rule out IgA vasculitis.       Procedures Performed:   None  None    Follow-up: prn for new or changing lesions    Staff:     Jihan Isabel MD PhD  Dermatology, Dermatopathology    ____________________________________________    CC: Derm Problem (Petechiae to torso and lower extremeties since October after illness and flu shot. )      HPI:  Alexi Montemayor is a(n) 39 year old female who presents today as a new patient for evaluation of rash on legs.    October, off and on congestion. Had a flu shot. Likely a virus.  She started to have rash on her lower extremities upper extremities and scattered papules on her abdomen which have since resolved.  Rash was relatively nonpruritic.  Foot was somewhat extensive.  She was worried about telangiectasias in its was of vascular-appearing rash.  It did come and go.  It has improved.  There is some residual rash on her legs.    She recently had labs which showed generalized  increase in CRP and ESR.  Her urinalysis was clear.    Patient is otherwise feeling well, without additional skin concerns.     Labs Reviewed:  UA RESULTS:  Recent Labs   Lab Test 12/30/24 2156   COLOR Straw   APPEARANCE Clear   URINEGLC Negative   URINEBILI Negative   URINEKETONE Negative   SG 1.003   UBLD Negative   URINEPH 6.5   PROTEIN Negative   NITRITE Negative   LEUKEST Negative   RBCU <1   WBCU 1       Lab Results   Component Value Date    WBC 7.4 01/31/2025    HGB 11.4 (L) 01/31/2025    HCT 35.5 01/31/2025     01/31/2025     12/30/2024    POTASSIUM 3.7 12/30/2024    CHLORIDE 103 12/30/2024    CO2 22 12/30/2024    BUN 11.4 12/30/2024    CR 0.80 12/30/2024     (H) 12/30/2024    SED 33 (H) 01/31/2025    DD 0.4 10/12/2018    TROPI <0.015 10/12/2018    AST 15 12/30/2024    ALT 14 12/30/2024    ALKPHOS 72 12/30/2024    BILITOTAL 0.2 12/30/2024        Physical Exam:  Vitals: There were no vitals taken for this visit.  SKIN: Focused examination of lower extremities was performed.  - Scattered pink papules.  Blanching.  - No other lesions of concern on areas examined.       Medications:  Current Outpatient Medications   Medication Sig Dispense Refill     citalopram (CELEXA) 20 MG tablet Take 2 tablets (40 mg) by mouth daily  1     multivitamin w/minerals (THERA-VIT-M) tablet Take 1 tablet by mouth daily (Patient not taking: Reported on 2/18/2025)       No current facility-administered medications for this visit.      Past Medical History:   Patient Active Problem List   Diagnosis     Anxiety     Major depressive disorder, recurrent episode, moderate with anxious distress (H)     Gallstones     Hepatic steatosis       Past Medical History:   Diagnosis Date     Anxiety      Depression        CC: Primary Care Provider: Rosita Campos or Referring Provider: Referred Self      Again, thank you for allowing me to participate in the care of your patient.        Sincerely,        Jihan  MD Chalo    Electronically signed

## 2025-02-18 NOTE — PROGRESS NOTES
[facility] Dermatology Note  Encounter Date: Feb 18, 2025  Office Visit     Dermatology Problem List:  1.  Capillaritis    ____________________________________________    Assessment & Plan:     # Capillaritis  Ddx: Capillaritis versus papular eczema versus early and/or late resolving LCV  -Start triamcinolone 0.1% cream, once daily as needed for itch or rash.  - Rutoside (50mg twice daily) and Ascorbic acid (500-1000mg daily)  - You can consider compression stockings to help reduce the inflammation.  -Reviewed return signs.  Encourage patient to return should she have more vascular, bloody or palpable purpura.  This to be assigned that this is progressing to leukocytoclastic vasculitis.  We would consider biopsy in this indication to rule out IgA vasculitis.       Procedures Performed:   None  None    Follow-up: prn for new or changing lesions    Staff:     Jihan Isabel MD PhD  Dermatology, Dermatopathology    ____________________________________________    CC: Derm Problem (Petechiae to torso and lower extremeties since October after illness and flu shot. )      HPI:  Alexi Montemayor is a(n) 39 year old female who presents today as a new patient for evaluation of rash on legs.    October, off and on congestion. Had a flu shot. Likely a virus.  She started to have rash on her lower extremities upper extremities and scattered papules on her abdomen which have since resolved.  Rash was relatively nonpruritic.  Foot was somewhat extensive.  She was worried about telangiectasias in its was of vascular-appearing rash.  It did come and go.  It has improved.  There is some residual rash on her legs.    She recently had labs which showed generalized increase in CRP and ESR.  Her urinalysis was clear.    Patient is otherwise feeling well, without additional skin concerns.     Labs Reviewed:  UA RESULTS:  Recent Labs   Lab Test 12/30/24  6912   COLOR Straw   APPEARANCE Clear   URINEGLC Negative    URINEBILI Negative   URINEKETONE Negative   SG 1.003   UBLD Negative   URINEPH 6.5   PROTEIN Negative   NITRITE Negative   LEUKEST Negative   RBCU <1   WBCU 1       Lab Results   Component Value Date    WBC 7.4 01/31/2025    HGB 11.4 (L) 01/31/2025    HCT 35.5 01/31/2025     01/31/2025     12/30/2024    POTASSIUM 3.7 12/30/2024    CHLORIDE 103 12/30/2024    CO2 22 12/30/2024    BUN 11.4 12/30/2024    CR 0.80 12/30/2024     (H) 12/30/2024    SED 33 (H) 01/31/2025    DD 0.4 10/12/2018    TROPI <0.015 10/12/2018    AST 15 12/30/2024    ALT 14 12/30/2024    ALKPHOS 72 12/30/2024    BILITOTAL 0.2 12/30/2024        Physical Exam:  Vitals: There were no vitals taken for this visit.  SKIN: Focused examination of lower extremities was performed.  - Scattered pink papules.  Blanching.  - No other lesions of concern on areas examined.       Medications:  Current Outpatient Medications   Medication Sig Dispense Refill    citalopram (CELEXA) 20 MG tablet Take 2 tablets (40 mg) by mouth daily  1    multivitamin w/minerals (THERA-VIT-M) tablet Take 1 tablet by mouth daily (Patient not taking: Reported on 2/18/2025)       No current facility-administered medications for this visit.      Past Medical History:   Patient Active Problem List   Diagnosis    Anxiety    Major depressive disorder, recurrent episode, moderate with anxious distress (H)    Gallstones    Hepatic steatosis       Past Medical History:   Diagnosis Date    Anxiety     Depression        CC: Primary Care Provider: Rosita Campos or Referring Provider: Referred Self

## 2025-03-04 ENCOUNTER — TRANSFERRED RECORDS (OUTPATIENT)
Dept: HEALTH INFORMATION MANAGEMENT | Facility: CLINIC | Age: 40
End: 2025-03-04
Payer: COMMERCIAL

## 2025-04-08 ENCOUNTER — OFFICE VISIT (OUTPATIENT)
Dept: FAMILY MEDICINE | Facility: CLINIC | Age: 40
End: 2025-04-08
Payer: COMMERCIAL

## 2025-04-08 VITALS
BODY MASS INDEX: 33.79 KG/M2 | TEMPERATURE: 97.1 F | WEIGHT: 197.9 LBS | HEIGHT: 64 IN | DIASTOLIC BLOOD PRESSURE: 86 MMHG | SYSTOLIC BLOOD PRESSURE: 130 MMHG | HEART RATE: 75 BPM | RESPIRATION RATE: 16 BRPM | OXYGEN SATURATION: 98 %

## 2025-04-08 DIAGNOSIS — F41.9 ANXIETY: ICD-10-CM

## 2025-04-08 DIAGNOSIS — R30.0 DYSURIA: ICD-10-CM

## 2025-04-08 DIAGNOSIS — F33.1 MAJOR DEPRESSIVE DISORDER, RECURRENT EPISODE, MODERATE WITH ANXIOUS DISTRESS (H): ICD-10-CM

## 2025-04-08 DIAGNOSIS — N30.01 ACUTE CYSTITIS WITH HEMATURIA: Primary | ICD-10-CM

## 2025-04-08 DIAGNOSIS — R23.3 PETECHIAL RASH: ICD-10-CM

## 2025-04-08 PROBLEM — K80.20 GALLSTONES: Status: RESOLVED | Noted: 2023-11-27 | Resolved: 2025-04-08

## 2025-04-08 LAB
ALBUMIN UR-MCNC: NEGATIVE MG/DL
APPEARANCE UR: CLEAR
BACTERIA #/AREA URNS HPF: ABNORMAL /HPF
BASOPHILS # BLD AUTO: 0 10E3/UL (ref 0–0.2)
BASOPHILS NFR BLD AUTO: 0 %
BILIRUB UR QL STRIP: NEGATIVE
COLOR UR AUTO: YELLOW
EOSINOPHIL # BLD AUTO: 0.1 10E3/UL (ref 0–0.7)
EOSINOPHIL NFR BLD AUTO: 2 %
ERYTHROCYTE [DISTWIDTH] IN BLOOD BY AUTOMATED COUNT: 13.3 % (ref 10–15)
GLUCOSE UR STRIP-MCNC: NEGATIVE MG/DL
HCT VFR BLD AUTO: 37.2 % (ref 35–47)
HGB BLD-MCNC: 11.8 G/DL (ref 11.7–15.7)
HGB UR QL STRIP: ABNORMAL
IMM GRANULOCYTES # BLD: 0 10E3/UL
IMM GRANULOCYTES NFR BLD: 0 %
KETONES UR STRIP-MCNC: NEGATIVE MG/DL
LEUKOCYTE ESTERASE UR QL STRIP: ABNORMAL
LYMPHOCYTES # BLD AUTO: 2.4 10E3/UL (ref 0.8–5.3)
LYMPHOCYTES NFR BLD AUTO: 32 %
MCH RBC QN AUTO: 25.4 PG (ref 26.5–33)
MCHC RBC AUTO-ENTMCNC: 31.7 G/DL (ref 31.5–36.5)
MCV RBC AUTO: 80 FL (ref 78–100)
MONOCYTES # BLD AUTO: 0.5 10E3/UL (ref 0–1.3)
MONOCYTES NFR BLD AUTO: 6 %
NEUTROPHILS # BLD AUTO: 4.4 10E3/UL (ref 1.6–8.3)
NEUTROPHILS NFR BLD AUTO: 59 %
NITRATE UR QL: NEGATIVE
PH UR STRIP: 5.5 [PH] (ref 5–7)
PLATELET # BLD AUTO: 282 10E3/UL (ref 150–450)
RBC # BLD AUTO: 4.65 10E6/UL (ref 3.8–5.2)
RBC #/AREA URNS AUTO: ABNORMAL /HPF
SP GR UR STRIP: 1.01 (ref 1–1.03)
SQUAMOUS #/AREA URNS AUTO: ABNORMAL /LPF
UROBILINOGEN UR STRIP-ACNC: 0.2 E.U./DL
WBC # BLD AUTO: 7.3 10E3/UL (ref 4–11)
WBC #/AREA URNS AUTO: ABNORMAL /HPF

## 2025-04-08 PROCEDURE — 99214 OFFICE O/P EST MOD 30 MIN: CPT | Performed by: FAMILY MEDICINE

## 2025-04-08 PROCEDURE — 3075F SYST BP GE 130 - 139MM HG: CPT | Performed by: FAMILY MEDICINE

## 2025-04-08 PROCEDURE — 81001 URINALYSIS AUTO W/SCOPE: CPT | Performed by: FAMILY MEDICINE

## 2025-04-08 PROCEDURE — 36415 COLL VENOUS BLD VENIPUNCTURE: CPT | Performed by: FAMILY MEDICINE

## 2025-04-08 PROCEDURE — 85025 COMPLETE CBC W/AUTO DIFF WBC: CPT | Performed by: FAMILY MEDICINE

## 2025-04-08 PROCEDURE — 1126F AMNT PAIN NOTED NONE PRSNT: CPT | Performed by: FAMILY MEDICINE

## 2025-04-08 PROCEDURE — 3079F DIAST BP 80-89 MM HG: CPT | Performed by: FAMILY MEDICINE

## 2025-04-08 PROCEDURE — 87086 URINE CULTURE/COLONY COUNT: CPT | Performed by: FAMILY MEDICINE

## 2025-04-08 ASSESSMENT — PAIN SCALES - GENERAL: PAINLEVEL_OUTOF10: NO PAIN (0)

## 2025-04-08 ASSESSMENT — PATIENT HEALTH QUESTIONNAIRE - PHQ9
10. IF YOU CHECKED OFF ANY PROBLEMS, HOW DIFFICULT HAVE THESE PROBLEMS MADE IT FOR YOU TO DO YOUR WORK, TAKE CARE OF THINGS AT HOME, OR GET ALONG WITH OTHER PEOPLE: NOT DIFFICULT AT ALL
SUM OF ALL RESPONSES TO PHQ QUESTIONS 1-9: 3
SUM OF ALL RESPONSES TO PHQ QUESTIONS 1-9: 3

## 2025-04-08 NOTE — RESULT ENCOUNTER NOTE
Hello -    Here are my comments about your recent results:  Analysis shows small leukocyte esterase And 10-25 white count and 2-5 red cells which may suggest a bladder infection.  Nitrite was negative and there were squamous cells seen which may also suggest collection contamination.  We have set it up for culture and will get the result in 2 days time.  Currently recommend using Azo over-the-counter 3 times a day for 3 days until we get the culture results to determine if there is any bacteria in which case we would then suggest an antibiotic if indicated     Please let us know if you have any questions or concerns.     Regards,  Cristela Adams MD

## 2025-04-08 NOTE — RESULT ENCOUNTER NOTE
Hello -    Here are my comments about your recent results:  So far normal CBC-Normal red blood cell (hgb) levels, normal white blood cell count and normal platelet levels.  For additional lab test information, labtestsonline.org is an excellent reference..    Please let us know if you have any questions or concerns.     Regards,  Cristela Adams MD

## 2025-04-08 NOTE — PROGRESS NOTES
The below note was dictated using voice recognition. Although reviewed after completion, some word and grammatical error may remain .         Assessment & Plan     Acute cystitis with hematuria  Episode pink discoloration in toilet bowl and on wiping after second void this morning with some discomfort in the urethral area.  History and exam suggestive of acute cystitis will do a UA reports no risk for STDs so deferred checking GC.  Symptoms and exam not suggestive of a kidney stone.  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  Will check a CBC today and referred to urology due to anxiety to make sure not missing anything else.  Encouraged to push fluids and to Azo over-the-counter up to 3 times a day for the next 3 days to help with the burning sensation until we get the urine results in.  If should have fever back pain vomiting worsening bleeding or new symptoms to contact the clinic or go to the ER.  Agreeable with plan in place  - Adult Urology  Referral; Future  - UA with Microscopic reflex to Culture - lab collect; Future  - CBC with platelets and differential; Future  - UA with Microscopic reflex to Culture - lab collect  - CBC with platelets and differential  - UA Microscopic with Reflex to Culture    Dysuria  - UA with Microscopic reflex to Culture - lab collect; Future  - CBC with platelets and differential; Future  - UA with Microscopic reflex to Culture - lab collect  - CBC with platelets and differential  - UA Microscopic with Reflex to Culture    Petechial rash  Petechial rash worked up by dermatology in rheumatology outside [place].  Reports was told was not vasculitis but capillaritis.  Currently no acute flare noted.  But given episode of hematuria will check a CBC today.  This later came back normal.  - Adult Urology  Referral; Future  - UA with Microscopic reflex to Culture - lab collect; Future  - CBC with platelets and  "differential; Future  - UA with Microscopic reflex to Culture - lab collect  - CBC with platelets and differential  - UA Microscopic with Reflex to Culture    Anxiety  Major depressive disorder, recurrent episode, moderate with anxious distress (H)  Able on citalopram managed by her primary care provider.  Seemed reassured with current plan in place.    BMI  Estimated body mass index is 34.51 kg/m  as calculated from the following:    Height as of this encounter: 1.613 m (5' 3.5\").    Weight as of this encounter: 89.8 kg (197 lb 14.4 oz).   Weight management plan: Patient was referred to their PCP to discuss a diet and exercise plan.    See Patient Instructions    Subjective   Alexi is a 39 year old, presenting for the following health issues:  Painful urination ( near the urethra with blood in toilet/Started -today)        4/8/2025     2:07 PM   Additional Questions   Roomed by Carmen mathew   Accompanied by self     History of Present Illness       Reason for visit:  Painful urination near urethra with blood  Symptom onset:  Today  Symptoms include:  Urine was kind of cloudy with first urine, second time peeing this morning it hurt really bad near urethra opening and there was blood in toliet  Symptom progression:  Staying the same  Had these symptoms before:  No  What makes it worse:  Unsure, this just started this morning  What makes it better:  Unsure, this just started this morning She is missing 1 dose(s) of medications per week.  She is not taking prescribed medications regularly due to remembering to take.        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 Lillian Neely,    Notes this morning went to the bathroom urine was cloudy as it tends to happen now and then but then had to urinate again before going to work and while she was urinating it really hurt in the urethral area and then noted pink on wiping as well as pink discoloration in the toilet bowl.  There were no bright red blood or clots noted.  Is pretty sure it was not vaginal or rectal.  First day of last menstrual period was 4 April.  Cycles are regular no form of birth control.  Last intercourse was 2 weeks prior.  Tends to wear a pad instead of a tampon may have some chafing.  Sometimes feels it bothers the urethral area.  No genital sores noted.  Since then has been urinating throughout the day feeling going more frequently with some urgency but only passing a small amount of urine and has noted no blood or pink discoloration.  It was burning a little bit initially but not anymore.  This has never happened to her before and the blood is worrisome to her.  Has been drinking more water.  Reports no nosebleeds or bruising or blood in the stools.  Does note a history of a petechial rash for which evaluated by dermatology and rheumatology outside [place].  Was told it was not vasculitis just capillaritis and to use vitamin C and supplement.  Has had no urinary hesitancy or dribbling no incontinence.  No no vaginal discharge.  Reports no concerns for STD.  Reports no fever or chills, no headache or dizziness, no double or blurry vision, no facial pain, earache, sore throat, runny nose, post nasal drip, no trouble hearing, smelling, tasting or swallowing, no cough , no chest pain, trouble breathing or palpitations, No abdominal pain, heart burn, reflux, nausea or vomiting or diarrhea or  "constipation, no blood in stools or black stools, no weight loss or night sweats. No leg swelling or joint pain. No new rash.  Still the petechial spots can flareup with viral illness excetra.    Review of Systems  Constitutional, HEENT, cardiovascular, pulmonary, GI, , musculoskeletal, neuro, skin, endocrine and psych systems are negative, except as otherwise noted.      Objective    /86 (BP Location: Right arm, Patient Position: Sitting, Cuff Size: Adult Regular)   Pulse 75   Temp 97.1  F (36.2  C) (Temporal)   Resp 16   Ht 1.613 m (5' 3.5\")   Wt 89.8 kg (197 lb 14.4 oz)   LMP 04/03/2025 (Approximate)   SpO2 98%   BMI 34.51 kg/m    Body mass index is 34.51 kg/m .  Physical Exam   GENERAL: alert and no distress  EYES: Eyes grossly normal to inspection, PERRL and conjunctivae and sclerae normal  HENT: ear canals and TM's normal, nose and mouth without ulcers or lesions  NECK: no adenopathy, no asymmetry, masses, or scars  RESP: lungs clear to auscultation - no rales, rhonchi or wheezes  CV: regular rate and rhythm, normal S1 S2, no S3 or S4, no murmur, click or rub, no peripheral edema  ABDOMEN: soft, non tender, no hepatosplenomegaly, no masses and bowel sounds normal no CVA tenderness,  MS: no gross musculoskeletal defects noted, no edema  SKIN: no suspicious lesions or rashes, no bruising noted faint petechiae right anterior lower shin  NEURO: Normal strength and tone, mentation intact and speech normal  PSYCH: mentation appears normal, affect normal/bright, anxious, judgement and insight intact, and appearance well groomed    Results for orders placed or performed in visit on 04/08/25   CBC with platelets and differential     Status: Abnormal   Result Value Ref Range    WBC Count 7.3 4.0 - 11.0 10e3/uL    RBC Count 4.65 3.80 - 5.20 10e6/uL    Hemoglobin 11.8 11.7 - 15.7 g/dL    Hematocrit 37.2 35.0 - 47.0 %    MCV 80 78 - 100 fL    MCH 25.4 (L) 26.5 - 33.0 pg    MCHC 31.7 31.5 - 36.5 g/dL    RDW " 13.3 10.0 - 15.0 %    Platelet Count 282 150 - 450 10e3/uL    % Neutrophils 59 %    % Lymphocytes 32 %    % Monocytes 6 %    % Eosinophils 2 %    % Basophils 0 %    % Immature Granulocytes 0 %    Absolute Neutrophils 4.4 1.6 - 8.3 10e3/uL    Absolute Lymphocytes 2.4 0.8 - 5.3 10e3/uL    Absolute Monocytes 0.5 0.0 - 1.3 10e3/uL    Absolute Eosinophils 0.1 0.0 - 0.7 10e3/uL    Absolute Basophils 0.0 0.0 - 0.2 10e3/uL    Absolute Immature Granulocytes 0.0 <=0.4 10e3/uL   CBC with platelets and differential     Status: Abnormal    Narrative    The following orders were created for panel order CBC with platelets and differential.  Procedure                               Abnormality         Status                     ---------                               -----------         ------                     CBC with platelets and ...[[id number]]  Abnormal            Final result                 Please view results for these tests on the individual orders.     Results for orders placed or performed in visit on 04/08/25 (from the past 24 hours)   CBC with platelets and differential    Narrative    The following orders were created for panel order CBC with platelets and differential.  Procedure                               Abnormality         Status                     ---------                               -----------         ------                     CBC with platelets and ...[[id number]]  Abnormal            Final result                 Please view results for these tests on the individual orders.   CBC with platelets and differential   Result Value Ref Range    WBC Count 7.3 4.0 - 11.0 10e3/uL    RBC Count 4.65 3.80 - 5.20 10e6/uL    Hemoglobin 11.8 11.7 - 15.7 g/dL    Hematocrit 37.2 35.0 - 47.0 %    MCV 80 78 - 100 fL    MCH 25.4 (L) 26.5 - 33.0 pg    MCHC 31.7 31.5 - 36.5 g/dL    RDW 13.3 10.0 - 15.0 %    Platelet Count 282 150 - 450 10e3/uL    % Neutrophils 59 %    % Lymphocytes 32 %    % Monocytes 6 %    % Eosinophils  2 %    % Basophils 0 %    % Immature Granulocytes 0 %    Absolute Neutrophils 4.4 1.6 - 8.3 10e3/uL    Absolute Lymphocytes 2.4 0.8 - 5.3 10e3/uL    Absolute Monocytes 0.5 0.0 - 1.3 10e3/uL    Absolute Eosinophils 0.1 0.0 - 0.7 10e3/uL    Absolute Basophils 0.0 0.0 - 0.2 10e3/uL    Absolute Immature Granulocytes 0.0 <=0.4 10e3/uL           Signed Electronically by: Cristela Adams MD

## 2025-04-08 NOTE — PATIENT INSTRUCTIONS
Will do UA and cbc to check symptoms  Push fluids   Azo otc up to 3/ day for 3 days to help with burning  Urology referral given to complete work up given hx of petechial rash

## 2025-04-09 LAB — BACTERIA UR CULT: NORMAL

## 2025-04-09 NOTE — RESULT ENCOUNTER NOTE
Hello -    Here are my comments about your recent results:  No bladder infection seen  Continue with pushing fluids and taking azo to help with symptoms  If anything changes then do a recheck  Make a urology apt to complete evaluation     Please let us know if you have any questions or concerns.     Regards,  Cristela Adams MD

## 2025-04-23 ENCOUNTER — MYC MEDICAL ADVICE (OUTPATIENT)
Dept: FAMILY MEDICINE | Facility: CLINIC | Age: 40
End: 2025-04-23
Payer: COMMERCIAL

## 2025-04-23 NOTE — TELEPHONE ENCOUNTER
See my chart     Asking for anxiety medication to fly, has not seen pcp since 2023     Advised to send [place], Registered Nurse  [facility]

## 2025-04-28 ENCOUNTER — PRE VISIT (OUTPATIENT)
Dept: UROLOGY | Facility: CLINIC | Age: 40
End: 2025-04-28
Payer: COMMERCIAL

## 2025-04-28 NOTE — TELEPHONE ENCOUNTER
Reason for visit: consult (referred from FM)    Relevant information: painful urination with gross hematuria    Records/imaging/labs/order:   -available in Epic  -UA/UC from 4/8    At rooming: ask Barbara if she would like a urine sample      Tyler Harris  4/28/2025  3:18 PM

## 2025-05-19 ASSESSMENT — ENCOUNTER SYMPTOMS
ABDOMINAL PAIN: 0
ADENOPATHY: 0
DIZZINESS: 0
LIGHT-HEADEDNESS: 0
CHILLS: 0
EYES NEGATIVE: 1
BACK PAIN: 0
FEVER: 0
SLEEP DISTURBANCE: 0
SHORTNESS OF BREATH: 0

## 2025-05-19 NOTE — PROGRESS NOTES
Subjective     REFERRING PROVIDER  Cristela Adams    REASON FOR VISIT  Acute cystitis with hematuria    HISTORY OF PRESENT ILLNESS  Ms. Montemayor is a pleasant 39 year old female with a past medical history significant for anxiety, depression, and capillaritis who presents today for evaluation of suspected acute cystitis with hematuria.     25: Episode of pink discoloration in toilet bowl and on wiping after second void with urethral discomfort. No BRB or clots. Not felt to be vaginal or rectal in nature. History and exam suggestive of acute cystitis. CBC WNL. UA shows 2-5 RBC, 10-25 WBC. Culture negative. Management with fluids and Azo prn.     Hematuria Risk Factors:  Age >40: No  History of irritative voiding symptoms: No  History of urologic disorder or disease (nephrolithiasis, CA of bladder or kidney): No  History of urinary tract infection: No  Smoking history: No  Occupational exposure to chemicals or dyes (ie, benzenes, aromatic amines): No  Analgesic abuse: No  History of pelvic irradiation: No    Today:  Confirms above history. Also reports 3-10 RBCs on UA completed on 25, followed by a negative UA on 3/18/25 (says she was pushing fluids before this apt)  Denies recent UTI symptoms  Daily urinary habits:   Voids q3h/day; nocturia x1 (tends to load fluids before bed)  No leaking  Good strong stream  Feels that she does empty well  Fluid intake: ~60 oz, 80 oz on good day; plain and sparkling water  Caffeine: Coffee 1-2 cups in AM  Bowel habits: 1 BM every couple days, soft vs. pellets  GYN:   Regular cycles  Sexually active  Sometimes feels that she has vaginitis though recent testing negative  Urethral area feels irritated, more so after intercourse; using Replens  No dyspareunia or pelvic pain   She was using an essential oils body wash/soap around time of hematuria but stopped because she was concerned the irritation was contributing to bleeding    REVIEW OF SYSTEMS  Review of Systems    Constitutional:  Negative for chills and fever.   HENT: Negative.     Eyes: Negative.    Respiratory:  Negative for shortness of breath.    Cardiovascular:  Negative for chest pain.   Gastrointestinal:  Negative for abdominal pain.   Musculoskeletal:  Negative for back pain.   Neurological:  Negative for dizziness and light-headedness.   Hematological:  Negative for adenopathy.   Psychiatric/Behavioral:  Negative for sleep disturbance.      Social History:  Tobacco: No  Occupation: Pharmacy technician with [employer]    Family History:  Patient denies a family history of bladder/kidney/prostate/pelvic organ cancers or kidney stones.     Objective     /87   Pulse 91   LMP 04/03/2025 (Approximate)   SpO2 95%     Physical Exam  Constitutional:       General: She is not in acute distress.  HENT:      Head: Normocephalic.      Right Ear: External ear normal.      Left Ear: External ear normal.      Nose: Nose normal.   Eyes:      General: No scleral icterus.        Right eye: No discharge.         Left eye: No discharge.      Conjunctiva/sclera: Conjunctivae normal.   Pulmonary:      Effort: Pulmonary effort is normal. No respiratory distress.   Abdominal:      General: Abdomen is flat.   Musculoskeletal:         General: Normal range of motion.   Skin:     General: Skin is warm and dry.   Neurological:      General: No focal deficit present.      Mental Status: She is alert and oriented to person, place, and time.   Psychiatric:         Mood and Affect: Mood normal.       LABORATORY   Latest Reference Range & Units 04/08/25 15:14   Color Urine Colorless, Straw, Light Yellow, Yellow  Yellow   Appearance Urine Clear  Clear   Glucose Urine Negative mg/dL Negative   Bilirubin Urine Negative  Negative   Ketones Urine Negative mg/dL Negative   Specific Gravity Urine 1.003 - 1.035  1.010   pH Urine 5.0 - 7.0  5.5   Protein Albumin Urine Negative mg/dL Negative   Urobilinogen Urine 0.2, 1.0 E.U./dL 0.2    Nitrite Urine Negative  Negative   Blood Urine Negative  Small !   Leukocyte Esterase Urine Negative  Small !   WBC Urine 0-5 /HPF /HPF 10-25 !   RBC Urine 0-2 /HPF /HPF 2-5 !   Bacteria Urine None Seen /HPF Few !   Squamous Epithelial /LPF Urine None Seen /LPF Few !   !: Data is abnormal    Urine culture, 4/8/25 <10k CFU/mL mixture of urogenital gee    IMAGING    EXAM: CT ABDOMEN PELVIS W CONTRAST  LOCATION: [facility]  DATE: 12/31/2024     INDICATION: abd pain  COMPARISON: None.  TECHNIQUE: CT scan of the abdomen and pelvis was performed following injection of IV contrast. Multiplanar reformats were obtained. Dose reduction techniques were used.  CONTRAST: 100 mL Isovue 370     FINDINGS:   LOWER CHEST: Normal.  HEPATOBILIARY: The gallbladder is absent.  PANCREAS: Normal.  SPLEEN: Normal.  ADRENAL GLANDS: Normal.  KIDNEYS/BLADDER: Normal.  BOWEL: There is no bowel obstruction or inflammation. There is inflammation in the fat of the left anterior pelvis. Adjacent bowel is unremarkable. The appendix is normal. Small amount of free pelvic fluid is within physiologic limits. No free   intraperitoneal gas.  LYMPH NODES: Normal.  VASCULATURE: Normal.  PELVIC ORGANS: Small collapsing left ovarian follicle, within physiologic limits.  MUSCULOSKELETAL: Small paraumbilical hernia containing fat.                                                                 IMPRESSION:   1.  Epiploic appendagitis in the left anterior pelvis.    Assessment & Plan   Acute cystitis with hematuria  Vaginal dryness    It was my pleasure to meet with Ms. Montemayor in the office today in regards to her recent episode of acute cystitis with hematuria.    We discussed possible etiologies of hematuria including both benign and malignant causes including infection, inflammation, vaginal contaminant, kidney stones, bladder cancer, and kidney cancer, among other things. We discussed the American Urological Association's  "recommendation for workup of gross and microscopic hematuria (visible blood vs. 3 or more red blood cells on urinalysis) which often includes a cystoscopy and CT urogram. I described these 2 tests to Ms. Montemayor as well as the relative risks and benefits of each.    Ms. Montemayor is in a unique situation given she falls into the high risk category per AUA guidelines for having an episode of gross hematuria on 4/18 but otherwise has no concerning risk factors in the setting of negative CT A/P on 12/31/24. She also reports having irritative vaginal symptoms at the time of gross hematuria and reports \"immediate relief\" after urinating despite negative UA. Outside of this episode, she has had one instance of 3-10 RBC on 3/18/25 (UAs with </=2 RBCs in 2021, 2024, & 2024).    We utilized shared decision making to determine a plan for work-up. I believe it would be reasonable for Ms. Montemayor to undergo cystoscopy but refrain from upper tract imaging at this time. Discussed that we will continue to monitor for changes in urinary symptoms, visible blood in the urine, etc. If she continues to have >/= 3 RBCs on UA or has a subsequent episode of gross hematuria, she will follow-up for CT urogram. She expressed understanding and agreement with this plan. All questions and concerns addressed to the best of my ability.    PLAN  Continue using Replens for vaginal moisturizer   Next available cystoscopy  [facility] if >/= 3 RBCs on UA, gross blood, or new/concerning urinary symptoms    Barbara Baker PA-C  [facility]  "

## 2025-05-20 ENCOUNTER — OFFICE VISIT (OUTPATIENT)
Dept: UROLOGY | Facility: CLINIC | Age: 40
End: 2025-05-20
Payer: COMMERCIAL

## 2025-05-20 VITALS — HEART RATE: 91 BPM | OXYGEN SATURATION: 95 % | DIASTOLIC BLOOD PRESSURE: 87 MMHG | SYSTOLIC BLOOD PRESSURE: 120 MMHG

## 2025-05-20 DIAGNOSIS — R23.3 PETECHIAL RASH: ICD-10-CM

## 2025-05-20 DIAGNOSIS — N30.01 ACUTE CYSTITIS WITH HEMATURIA: ICD-10-CM

## 2025-05-20 PROCEDURE — 3074F SYST BP LT 130 MM HG: CPT

## 2025-05-20 PROCEDURE — 3079F DIAST BP 80-89 MM HG: CPT

## 2025-05-20 PROCEDURE — 1126F AMNT PAIN NOTED NONE PRSNT: CPT

## 2025-05-20 PROCEDURE — 99204 OFFICE O/P NEW MOD 45 MIN: CPT

## 2025-05-20 ASSESSMENT — PAIN SCALES - GENERAL: PAINLEVEL_OUTOF10: NO PAIN (0)

## 2025-05-20 NOTE — PATIENT INSTRUCTIONS
-Next available cystoscopy    Bladder Recommendations    - Drink 60+ oz of water daily (this is about 3 and 1/2 standard Aquafina bottles)  - Try to urinate every 2-3 hours   - Prevent constipation with a daily fiber supplement: Metamucil, Benefiber, or [place].   - Use MiraLax, magnesium citrate, or Senna/docusate as needed for bowel clean out.   - Avoid bladder irritants (see list below)  Caffeinated soft drinks  Coffee.  Tea.  Chocolate.  Acidic juices and fruits including: cranberry, tart apple or cherry, lemon, orange, pineapple and other citrus fruits.  Alcoholic beverages  Carbonated drinks, especially north  Aspartame/Nutrasweet  Spicy foods  Tomato products    Perineal Hygiene Recommendations     - Avoid using soap or any product with fragrance in the genital area. Wash only with warm water.   - Wipe from front to back  - Wear breathable cotton underwear    Thank you for meeting with me today! If you have any questions please do not hesitate to reach out to our office [phone] or reach out via [employer] with questions or concerns.     Barbara Baker PA-C  Department of Urology

## 2025-05-20 NOTE — LETTER
2025       RE: Alexi Montemayor  [address] [facility] [address]     Dear Colleague,    Thank you for referring your patient, Alexi Montemayor, to the [facility] [place] at [facility]. Please see a copy of my visit note below.    Subjective    REFERRING PROVIDER  Cristela Adams    REASON FOR VISIT  Acute cystitis with hematuria    HISTORY OF PRESENT ILLNESS  Ms. Montemayor is a pleasant 39 year old female with a past medical history significant for anxiety, depression, and capillaritis who presents today for evaluation of suspected acute cystitis with hematuria.     25: Episode of pink discoloration in toilet bowl and on wiping after second void with urethral discomfort. No BRB or clots. Not felt to be vaginal or rectal in nature. History and exam suggestive of acute cystitis. CBC WNL. UA shows 2-5 RBC, 10-25 WBC. Culture negative. Management with fluids and Azo prn.     Hematuria Risk Factors:  Age >40: No  History of irritative voiding symptoms: No  History of urologic disorder or disease (nephrolithiasis, CA of bladder or kidney): No  History of urinary tract infection: No  Smoking history: No  Occupational exposure to chemicals or dyes (ie, benzenes, aromatic amines): No  Analgesic abuse: No  History of pelvic irradiation: No    Today:  Confirms above history. Also reports 3-10 RBCs on UA completed on 25, followed by a negative UA on 3/18/25 (says she was pushing fluids before this apt)  Denies recent UTI symptoms  Daily urinary habits:   Voids q3h/day; nocturia x1 (tends to load fluids before bed)  No leaking  Good strong stream  Feels that she does empty well  Fluid intake: ~60 oz, 80 oz on good day; plain and sparkling water  Caffeine: Coffee 1-2 cups in AM  Bowel habits: 1 BM every couple days, soft vs. pellets  GYN:   Regular cycles  Sexually active  Sometimes feels that she has vaginitis though recent testing  negative  Urethral area feels irritated, more so after intercourse; using Replens  No dyspareunia or pelvic pain   She was using an essential oils body wash/soap around time of hematuria but stopped because she was concerned the irritation was contributing to bleeding    REVIEW OF SYSTEMS  Review of Systems   Constitutional:  Negative for chills and fever.   HENT: Negative.     Eyes: Negative.    Respiratory:  Negative for shortness of breath.    Cardiovascular:  Negative for chest pain.   Gastrointestinal:  Negative for abdominal pain.   Musculoskeletal:  Negative for back pain.   Neurological:  Negative for dizziness and light-headedness.   Hematological:  Negative for adenopathy.   Psychiatric/Behavioral:  Negative for sleep disturbance.      Social History:  Tobacco: No  Occupation: Pharmacy technician with [employer]    Family History:  Patient denies a family history of bladder/kidney/prostate/pelvic organ cancers or kidney stones.     Objective    /87   Pulse 91   LMP 04/03/2025 (Approximate)   SpO2 95%     Physical Exam  Constitutional:       General: She is not in acute distress.  HENT:      Head: Normocephalic.      Right Ear: External ear normal.      Left Ear: External ear normal.      Nose: Nose normal.   Eyes:      General: No scleral icterus.        Right eye: No discharge.         Left eye: No discharge.      Conjunctiva/sclera: Conjunctivae normal.   Pulmonary:      Effort: Pulmonary effort is normal. No respiratory distress.   Abdominal:      General: Abdomen is flat.   Musculoskeletal:         General: Normal range of motion.   Skin:     General: Skin is warm and dry.   Neurological:      General: No focal deficit present.      Mental Status: She is alert and oriented to person, place, and time.   Psychiatric:         Mood and Affect: Mood normal.       LABORATORY   Latest Reference Range & Units 04/08/25 15:14   Color Urine Colorless, Straw, Light Yellow, Yellow  Yellow    Appearance Urine Clear  Clear   Glucose Urine Negative mg/dL Negative   Bilirubin Urine Negative  Negative   Ketones Urine Negative mg/dL Negative   Specific Gravity Urine 1.003 - 1.035  1.010   pH Urine 5.0 - 7.0  5.5   Protein Albumin Urine Negative mg/dL Negative   Urobilinogen Urine 0.2, 1.0 E.U./dL 0.2   Nitrite Urine Negative  Negative   Blood Urine Negative  Small !   Leukocyte Esterase Urine Negative  Small !   WBC Urine 0-5 /HPF /HPF 10-25 !   RBC Urine 0-2 /HPF /HPF 2-5 !   Bacteria Urine None Seen /HPF Few !   Squamous Epithelial /LPF Urine None Seen /LPF Few !   !: Data is abnormal    Urine culture, 4/8/25 <10k CFU/mL mixture of urogenital gee    IMAGING    EXAM: CT ABDOMEN PELVIS W CONTRAST  LOCATION: [facility]  DATE: 12/31/2024     INDICATION: abd pain  COMPARISON: None.  TECHNIQUE: CT scan of the abdomen and pelvis was performed following injection of IV contrast. Multiplanar reformats were obtained. Dose reduction techniques were used.  CONTRAST: 100 mL Isovue 370     FINDINGS:   LOWER CHEST: Normal.  HEPATOBILIARY: The gallbladder is absent.  PANCREAS: Normal.  SPLEEN: Normal.  ADRENAL GLANDS: Normal.  KIDNEYS/BLADDER: Normal.  BOWEL: There is no bowel obstruction or inflammation. There is inflammation in the fat of the left anterior pelvis. Adjacent bowel is unremarkable. The appendix is normal. Small amount of free pelvic fluid is within physiologic limits. No free   intraperitoneal gas.  LYMPH NODES: Normal.  VASCULATURE: Normal.  PELVIC ORGANS: Small collapsing left ovarian follicle, within physiologic limits.  MUSCULOSKELETAL: Small paraumbilical hernia containing fat.                                                                 IMPRESSION:   1.  Epiploic appendagitis in the left anterior pelvis.    Assessment & Plan  Acute cystitis with hematuria  Vaginal dryness    It was my pleasure to meet with Ms. Montemayor in the office today in regards to her recent episode of  "acute cystitis with hematuria.    We discussed possible etiologies of hematuria including both benign and malignant causes including infection, inflammation, vaginal contaminant, kidney stones, bladder cancer, and kidney cancer, among other things. We discussed the American Urological Association's recommendation for workup of gross and microscopic hematuria (visible blood vs. 3 or more red blood cells on urinalysis) which often includes a cystoscopy and CT urogram. I described these 2 tests to Ms. Montemayor as well as the relative risks and benefits of each.    Ms. Montemayor is in a unique situation given she falls into the high risk category per AUA guidelines for having an episode of gross hematuria on 4/18 but otherwise has no concerning risk factors in the setting of negative CT A/P on 12/31/24. She also reports having irritative vaginal symptoms at the time of gross hematuria and reports \"immediate relief\" after urinating despite negative UA. Outside of this episode, she has had one instance of 3-10 RBC on 3/18/25 (UAs with </=2 RBCs in 2021, 2024, & 2024).    We utilized shared decision making to determine a plan for work-up. I believe it would be reasonable for Ms. Montemayor to undergo cystoscopy but refrain from upper tract imaging at this time. Discussed that we will continue to monitor for changes in urinary symptoms, visible blood in the urine, etc. If she continues to have >/= 3 RBCs on UA or has a subsequent episode of gross hematuria, she will follow-up for CT urogram. She expressed understanding and agreement with this plan. All questions and concerns addressed to the best of my ability.    PLAN  Continue using Replens for vaginal moisturizer   Next available cystoscopy  Follow-up via [facility] as needed if >/= 3 RBCs on UA, gross blood, or new/concerning urinary symptoms    Barbara Baker PA-C  [facility]    Again, thank you for allowing me to participate in the care of your patient.      Sincerely,    Barbara" MARILYN Baker PA-C

## 2025-05-20 NOTE — NURSING NOTE
Chief Complaint   Patient presents with    Cystitis     Rashes, saw rheumatology, they noticed some blood in her urine. Felt burning around her urethra once on the toilet and passed some blood. It burned a lot, only happened that one time.        Blood pressure 120/87, pulse 91, last menstrual period 04/03/2025, SpO2 95%, not currently breastfeeding. There is no height or weight on file to calculate BMI.    Patient Active Problem List   Diagnosis    Anxiety    Major depressive disorder, recurrent episode, moderate with anxious distress (H)    Hepatic steatosis       Allergies   Allergen Reactions    Pcn [Penicillins] Hives    Sulfa Antibiotics Hives       Current Outpatient Medications   Medication Sig Dispense Refill    citalopram (CELEXA) 20 MG tablet Take 2 tablets (40 mg) by mouth daily  1    multivitamin w/minerals (THERA-VIT-M) tablet Take 1 tablet by mouth daily      triamcinolone (KENALOG) 0.1 % external cream Apply topically 2 times daily. Use 1-2 times daily with flares of rash. Do not use for more than 2 weeks continuously in order to avoid adverse effects. Do not apply to face or folds. 80 g 3       Social History     Tobacco Use    Smoking status: Never     Passive exposure: Never    Smokeless tobacco: Never   Vaping Use    Vaping status: Never Used   Substance Use Topics    Alcohol use: Not Currently     Comment: Rarely    Drug use: No       Jennifer Darling  5/20/2025  7:44 AM

## 2025-05-21 ENCOUNTER — TELEPHONE (OUTPATIENT)
Dept: UROLOGY | Facility: CLINIC | Age: 40
End: 2025-05-21
Payer: COMMERCIAL

## 2025-05-21 NOTE — TELEPHONE ENCOUNTER
Left Voicemail (1st Attempt) for the patient to call back and schedule the following:    Appointment type: Cysto  Provider: Emma or Duke  Return date: Schedule a Cysto with whoever is available first, preferably with female provider (Doyle or Emma) if not too far out .   Specialty phone number: [phone]  Additional appointment(s) needed:   Additonal Notes:

## 2025-06-10 ENCOUNTER — OFFICE VISIT (OUTPATIENT)
Dept: UROLOGY | Facility: CLINIC | Age: 40
End: 2025-06-10
Payer: COMMERCIAL

## 2025-06-10 VITALS
DIASTOLIC BLOOD PRESSURE: 74 MMHG | HEIGHT: 64 IN | BODY MASS INDEX: 33.29 KG/M2 | WEIGHT: 195 LBS | SYSTOLIC BLOOD PRESSURE: 118 MMHG

## 2025-06-10 DIAGNOSIS — R31.0 GROSS HEMATURIA: ICD-10-CM

## 2025-06-10 DIAGNOSIS — N30.01 ACUTE CYSTITIS WITH HEMATURIA: Primary | ICD-10-CM

## 2025-06-10 LAB
ALBUMIN UR-MCNC: NEGATIVE MG/DL
APPEARANCE UR: CLEAR
BILIRUB UR QL STRIP: NEGATIVE
COLOR UR AUTO: YELLOW
GLUCOSE UR STRIP-MCNC: NEGATIVE MG/DL
HGB UR QL STRIP: ABNORMAL
KETONES UR STRIP-MCNC: NEGATIVE MG/DL
LEUKOCYTE ESTERASE UR QL STRIP: ABNORMAL
NITRATE UR QL: NEGATIVE
PH UR STRIP: 8 [PH] (ref 5–7)
SP GR UR STRIP: 1.02 (ref 1–1.03)
UROBILINOGEN UR STRIP-ACNC: 0.2 E.U./DL

## 2025-06-10 PROCEDURE — 3078F DIAST BP <80 MM HG: CPT | Performed by: STUDENT IN AN ORGANIZED HEALTH CARE EDUCATION/TRAINING PROGRAM

## 2025-06-10 PROCEDURE — 52000 CYSTOURETHROSCOPY: CPT | Performed by: STUDENT IN AN ORGANIZED HEALTH CARE EDUCATION/TRAINING PROGRAM

## 2025-06-10 PROCEDURE — 81003 URINALYSIS AUTO W/O SCOPE: CPT | Mod: QW | Performed by: STUDENT IN AN ORGANIZED HEALTH CARE EDUCATION/TRAINING PROGRAM

## 2025-06-10 PROCEDURE — 1126F AMNT PAIN NOTED NONE PRSNT: CPT | Performed by: STUDENT IN AN ORGANIZED HEALTH CARE EDUCATION/TRAINING PROGRAM

## 2025-06-10 PROCEDURE — 3074F SYST BP LT 130 MM HG: CPT | Performed by: STUDENT IN AN ORGANIZED HEALTH CARE EDUCATION/TRAINING PROGRAM

## 2025-06-10 RX ORDER — LIDOCAINE HYDROCHLORIDE 20 MG/ML
JELLY TOPICAL ONCE
Status: COMPLETED | OUTPATIENT
Start: 2025-06-10 | End: 2025-06-10

## 2025-06-10 RX ADMIN — LIDOCAINE HYDROCHLORIDE 5 ML: 20 JELLY TOPICAL at 10:35

## 2025-06-10 ASSESSMENT — PAIN SCALES - GENERAL: PAINLEVEL_OUTOF10: NO PAIN (0)

## 2025-06-10 NOTE — PATIENT INSTRUCTIONS

## 2025-06-10 NOTE — PROGRESS NOTES
"CHIEF COMPLAINT   Alexi Montemayor who is a 39 year old female returns today for follow-up of gross hematuria.      HPI   Alexi Montemayor is a 39 year old female returns today for follow-up of gross hematuria.      Denies further gross hematuria after the initial episode    PHYSICAL EXAM  Patient is a 39 year old  female   Vitals: Blood pressure 118/74, height 1.613 m (5' 3.5\"), weight 88.5 kg (195 lb), not currently breastfeeding.  Body mass index is 34 kg/m .  General Appearance Adult:   Alert, no acute distress, oriented  HENT: throat/mouth:normal, good dentition  Lungs: no respiratory distress, or pursed lip breathing  Heart: No obvious jugular venous distension present  Abdomen: nondistended  Musculoskeltal: extremities normal, no peripheral edema  Skin: no suspicious lesions or rashes  Neuro: Alert, oriented, speech and mentation normal  Psych: affect and mood normal  Gait: Normal  : normal urethral meatus    All pertinent imaging reviewed:    Ct a/p w contrast 12/31/2024    IMPRESSION:   1.  Epiploic appendagitis in the left anterior pelvis.      PRE-PROCEDURE DIAGNOSIS: gross hematuria    POST-PROCEDURE DIAGNOSIS: gross hematuria    PROCEDURE: Cystoscopy    DESCRIPTION OF PROCEDURE: After informed consent was obtained, the patient was brought to the procedure room where she was placed in the lithotomy position with all pressure points well padded.  The vulva was prepped and draped in sterile fashion. A flexible cystoscope was introduced through a well-lubricated urethra.     Urethra normal  Bladder unremarkable without any trabeculation, cellules or diverticuli. No bladder stones. No bladder tumor or raised erythema    The flexible cystoscope was removed and the findings were described to the patient.       ASSESSMENT and PLAN  39 year old female returns today for follow-up of gross hematuria.      No concerning findings on cystoscopy today  Discussed that she had a CT a/p w/ IV contrast (not a urogram) but " given her young age, and likelihood the hematuria was from cystitis, will defer further imaging for now    Follow up with urology as needed if new urinary issues, or if persistent microhematuria >=3 rbc/hpf      Enmanuel Little MD   [facility]  [facility] Phone: [phone]

## 2025-06-10 NOTE — NURSING NOTE
Chief Complaint   Patient presents with    Hematuria     Pt here for in office cystoscopy       Prior to the start of the procedure and with procedural staff participation, I verbally confirmed the patient s identity using two indicators, relevant allergies, that the procedure was appropriate and matched the consent or emergent situation, and that the correct equipment/implants were available. Immediately prior to starting the procedure I conducted the Time Out with the procedural staff and re-confirmed the patient s name, procedure, and site/side. I have wiped the patient off with the povidone-Iodine solution, draped them,  used Lidocaine hydrochloride jelly, and instilled sterile water into the bladder. (The Joint Commission universal protocol was followed.)  Yes    Sedation (Moderate or Deep): None     5mL 2% lidocaine hydrochloride Urojet instilled into urethra.    NDC# [address]-4  Lot #: [id number]  Expiration Date:  01/27    Kaylee Leonard CMA

## 2025-06-10 NOTE — LETTER
"6/10/2025       RE: Alexi Montemayor  [address] [facility] [address]     Dear Colleague,    Thank you for referring your patient, Alexi Montemayor, to the [facility]OLOGY CLIN[facility] [place] at [facility]. Please see a copy of my visit note below.    CHIEF COMPLAINT   Alexi Montemayor who is a 39 year old female returns today for follow-up of gross hematuria.      HPI   Alexi Montemayor is a 39 year old female returns today for follow-up of gross hematuria.      Denies further gross hematuria after the initial episode    PHYSICAL EXAM  Patient is a 39 year old  female   Vitals: Blood pressure 118/74, height 1.613 m (5' 3.5\"), weight 88.5 kg (195 lb), not currently breastfeeding.  Body mass index is 34 kg/m .  General Appearance Adult:   Alert, no acute distress, oriented  HENT: throat/mouth:normal, good dentition  Lungs: no respiratory distress, or pursed lip breathing  Heart: No obvious jugular venous distension present  Abdomen: nondistended  Musculoskeltal: extremities normal, no peripheral edema  Skin: no suspicious lesions or rashes  Neuro: Alert, oriented, speech and mentation normal  Psych: affect and mood normal  Gait: Normal  : normal urethral meatus    All pertinent imaging reviewed:    Ct a/p w contrast 12/31/2024    IMPRESSION:   1.  Epiploic appendagitis in the left anterior pelvis.      PRE-PROCEDURE DIAGNOSIS: gross hematuria    POST-PROCEDURE DIAGNOSIS: gross hematuria    PROCEDURE: Cystoscopy    DESCRIPTION OF PROCEDURE: After informed consent was obtained, the patient was brought to the procedure room where she was placed in the lithotomy position with all pressure points well padded.  The vulva was prepped and draped in sterile fashion. A flexible cystoscope was introduced through a well-lubricated urethra.     Urethra normal  Bladder unremarkable without any trabeculation, cellules or diverticuli. No bladder stones. No bladder tumor " or raised erythema    The flexible cystoscope was removed and the findings were described to the patient.       ASSESSMENT and PLAN  39 year old female returns today for follow-up of gross hematuria.      No concerning findings on cystoscopy today  Discussed that she had a CT a/p w/ IV contrast (not a urogram) but given her young age, and likelihood the hematuria was from cystitis, will defer further imaging for now    Follow up with urology as needed if new urinary issues, or if persistent microhematuria >=3 rbc/hpf      Enmanuel Little MD   [facility]  [facility] Phone: [phone]      Again, thank you for allowing me to participate in the care of your patient.      Sincerely,    Enmanuel Little MD

## 2025-07-21 ENCOUNTER — PATIENT OUTREACH (OUTPATIENT)
Dept: CARE COORDINATION | Facility: CLINIC | Age: 40
End: 2025-07-21
Payer: COMMERCIAL

## 2025-08-09 ENCOUNTER — HEALTH MAINTENANCE LETTER (OUTPATIENT)
Age: 40
End: 2025-08-09

## 2025-08-29 PROBLEM — Z83.710 FAMILY HISTORY OF ADENOMATOUS POLYP OF COLON: Status: ACTIVE | Noted: 2025-08-29

## 2025-08-29 PROBLEM — R10.2 PELVIC PAIN IN FEMALE: Status: ACTIVE | Noted: 2025-08-29

## 2025-08-29 PROBLEM — R21 RASH: Status: ACTIVE | Noted: 2025-08-29

## 2025-08-29 PROBLEM — M25.50 MULTIPLE JOINT PAIN: Status: ACTIVE | Noted: 2025-08-29

## 2025-09-01 ENCOUNTER — PATIENT OUTREACH (OUTPATIENT)
Dept: CARE COORDINATION | Facility: CLINIC | Age: 40
End: 2025-09-01
Payer: COMMERCIAL

## 2025-09-03 ENCOUNTER — PATIENT OUTREACH (OUTPATIENT)
Dept: CARE COORDINATION | Facility: CLINIC | Age: 40
End: 2025-09-03
Payer: COMMERCIAL

## (undated) DEVICE — PREP CHLORAPREP 26ML TINTED HI-LITE ORANGE 930815

## (undated) DEVICE — ENDO TROCAR FIRST ENTRY KII FIOS ADV FIX 05X100MM CFF03

## (undated) DEVICE — ENDO POUCH UNIV RETRIEVAL SYSTEM INZII 10MM CD001

## (undated) DEVICE — ESU GROUND PAD ADULT W/CORD E7507

## (undated) DEVICE — ESU CORD MONOPOLAR 10'  E0510

## (undated) DEVICE — SUCTION IRR STRYKERFLOW II W/TIP 250-070-520

## (undated) DEVICE — BAG CLEAR TRASH 1.3M 39X33" P4040C

## (undated) DEVICE — LINEN POUCH DBL 5427

## (undated) DEVICE — GLOVE BIOGEL PI MICRO INDICATOR UNDERGLOVE SZ 8.0 48980

## (undated) DEVICE — GLOVE BIOGEL PI MICRO SZ 7.5 48575

## (undated) DEVICE — ENDO TROCAR BLUNT TIP KII BALLOON 12X100MM C0R47

## (undated) DEVICE — SU VICRYL 0 UR-6 27" J603H

## (undated) DEVICE — CLIP APPLIER ENDO 5MM M/L LIGAMAX EL5ML

## (undated) DEVICE — LINEN FULL SHEET 5511

## (undated) DEVICE — LINEN TOWEL PACK X10 5473

## (undated) DEVICE — DECANTER BAG 2002S

## (undated) DEVICE — SU VICRYL 4-0 PS-2 18" UND J496H

## (undated) DEVICE — SU DERMABOND ADVANCED .7ML DNX12

## (undated) DEVICE — Device

## (undated) DEVICE — ENDO TROCAR SLEEVE KII Z-THREADED 05X100MM CTS02

## (undated) DEVICE — SUCTION CANISTER MEDIVAC LINER 3000ML W/LID 65651-530

## (undated) DEVICE — BLADE KNIFE SURG 11 371111

## (undated) DEVICE — ENDO SPONGE ENDOSTICK KITTNER 5MM CHERRY 37002010

## (undated) DEVICE — SOL NACL 0.9% IRRIG 1000ML BOTTLE 2F7124

## (undated) DEVICE — LINEN HALF SHEET 5512

## (undated) RX ORDER — CEFAZOLIN SODIUM/WATER 2 G/20 ML
SYRINGE (ML) INTRAVENOUS
Status: DISPENSED
Start: 2023-11-30

## (undated) RX ORDER — INDOCYANINE GREEN AND WATER 25 MG
KIT INJECTION
Status: DISPENSED
Start: 2023-11-30

## (undated) RX ORDER — DEXAMETHASONE SODIUM PHOSPHATE 4 MG/ML
INJECTION, SOLUTION INTRA-ARTICULAR; INTRALESIONAL; INTRAMUSCULAR; INTRAVENOUS; SOFT TISSUE
Status: DISPENSED
Start: 2023-11-30

## (undated) RX ORDER — BUPIVACAINE HYDROCHLORIDE 5 MG/ML
INJECTION, SOLUTION EPIDURAL; INTRACAUDAL
Status: DISPENSED
Start: 2023-11-30

## (undated) RX ORDER — PROPOFOL 10 MG/ML
INJECTION, EMULSION INTRAVENOUS
Status: DISPENSED
Start: 2023-11-30

## (undated) RX ORDER — GLYCOPYRROLATE 0.2 MG/ML
INJECTION INTRAMUSCULAR; INTRAVENOUS
Status: DISPENSED
Start: 2023-11-30

## (undated) RX ORDER — NEOSTIGMINE METHYLSULFATE 1 MG/ML
VIAL (ML) INJECTION
Status: DISPENSED
Start: 2023-11-30

## (undated) RX ORDER — FENTANYL CITRATE-0.9 % NACL/PF 10 MCG/ML
PLASTIC BAG, INJECTION (ML) INTRAVENOUS
Status: DISPENSED
Start: 2023-11-30

## (undated) RX ORDER — FENTANYL CITRATE 50 UG/ML
INJECTION, SOLUTION INTRAMUSCULAR; INTRAVENOUS
Status: DISPENSED
Start: 2023-11-30

## (undated) RX ORDER — HYDROCODONE BITARTRATE AND ACETAMINOPHEN 5; 325 MG/1; MG/1
TABLET ORAL
Status: DISPENSED
Start: 2023-11-30

## (undated) RX ORDER — ONDANSETRON 2 MG/ML
INJECTION INTRAMUSCULAR; INTRAVENOUS
Status: DISPENSED
Start: 2023-11-30

## (undated) RX ORDER — LIDOCAINE HYDROCHLORIDE 10 MG/ML
INJECTION, SOLUTION EPIDURAL; INFILTRATION; INTRACAUDAL; PERINEURAL
Status: DISPENSED
Start: 2023-11-30

## (undated) RX ORDER — SCOLOPAMINE TRANSDERMAL SYSTEM 1 MG/1
PATCH, EXTENDED RELEASE TRANSDERMAL
Status: DISPENSED
Start: 2023-11-30